# Patient Record
Sex: FEMALE | Race: WHITE | NOT HISPANIC OR LATINO | Employment: FULL TIME | URBAN - METROPOLITAN AREA
[De-identification: names, ages, dates, MRNs, and addresses within clinical notes are randomized per-mention and may not be internally consistent; named-entity substitution may affect disease eponyms.]

---

## 2017-05-25 ENCOUNTER — TRANSCRIBE ORDERS (OUTPATIENT)
Dept: OBSTETRICS AND GYNECOLOGY | Facility: CLINIC | Age: 52
End: 2017-05-25

## 2017-05-25 DIAGNOSIS — Z00.00 ROUTINE MEDICAL EXAM: Primary | ICD-10-CM

## 2017-06-22 ENCOUNTER — OFFICE VISIT (OUTPATIENT)
Dept: OBSTETRICS AND GYNECOLOGY | Facility: CLINIC | Age: 52
End: 2017-06-22

## 2017-06-22 ENCOUNTER — HOSPITAL ENCOUNTER (OUTPATIENT)
Dept: MAMMOGRAPHY | Facility: HOSPITAL | Age: 52
Discharge: HOME | End: 2017-06-22
Attending: OBSTETRICS & GYNECOLOGY | Admitting: OBSTETRICS & GYNECOLOGY

## 2017-06-22 VITALS
DIASTOLIC BLOOD PRESSURE: 84 MMHG | WEIGHT: 211 LBS | BODY MASS INDEX: 39.84 KG/M2 | HEIGHT: 61 IN | SYSTOLIC BLOOD PRESSURE: 122 MMHG

## 2017-06-22 DIAGNOSIS — L90.0 LICHEN SCLEROSUS ET ATROPHICUS: ICD-10-CM

## 2017-06-22 DIAGNOSIS — Z13.9 SCREENING: Primary | ICD-10-CM

## 2017-06-22 DIAGNOSIS — Z11.51 ENCOUNTER FOR SCREENING FOR HUMAN PAPILLOMAVIRUS (HPV): ICD-10-CM

## 2017-06-22 DIAGNOSIS — Z00.00 ROUTINE MEDICAL EXAM: ICD-10-CM

## 2017-06-22 DIAGNOSIS — Z00.00 ENCOUNTER FOR ANNUAL GENERAL MEDICAL EXAMINATION WITHOUT ABNORMAL FINDINGS IN ADULT: ICD-10-CM

## 2017-06-22 DIAGNOSIS — R92.8 ABNORMAL MAMMOGRAM: Primary | ICD-10-CM

## 2017-06-22 LAB
BILIRUB BLD-MCNC: NEGATIVE MG/DL
CLARITY, POC: CLEAR
COLOR UR: YELLOW
GLUCOSE UR STRIP-MCNC: NEGATIVE MG/DL
KETONES UR QL: NEGATIVE
LEUKOCYTE EST, POC: NEGATIVE
NITRITE UR-MCNC: NEGATIVE MG/ML
PH UR: 5 [PH] (ref 5–8)
PROT UR STRIP-MCNC: NEGATIVE MG/DL
RBC # UR STRIP: NEGATIVE /UL
SP GR UR: 1.03 (ref 1–1.03)
UROBILINOGEN UR QL: NORMAL

## 2017-06-22 PROCEDURE — 77063 BREAST TOMOSYNTHESIS BI: CPT

## 2017-06-22 PROCEDURE — G0202 SCR MAMMO BI INCL CAD: HCPCS

## 2017-06-22 PROCEDURE — 81002 URINALYSIS NONAUTO W/O SCOPE: CPT | Performed by: OBSTETRICS & GYNECOLOGY

## 2017-06-22 PROCEDURE — 99396 PREV VISIT EST AGE 40-64: CPT | Performed by: OBSTETRICS & GYNECOLOGY

## 2017-06-22 RX ORDER — ROSUVASTATIN CALCIUM 5 MG/1
5 TABLET, COATED ORAL NIGHTLY
COMMUNITY

## 2017-06-22 RX ORDER — ASPIRIN 81 MG/1
81 TABLET, CHEWABLE ORAL DAILY
COMMUNITY
End: 2017-08-24 | Stop reason: HOSPADM

## 2017-06-22 NOTE — PROGRESS NOTES
GYN Annual Exam     CC- Here for annual exam.     Jessica Suazo is a 51 y.o. female est pt who presents for annual well woman exam. Periods are irregular, lasting 4 days. May skip 2-4 months at a time. She is having a LS flare and has run out of Clobetasol. Had MMG today    OB History      Para Term  AB TAB SAB Ectopic Multiple Living    1 1 1       1        Obstetric Comments    1 C/S          Menarche: 14  Current contraception: abstinence and condoms  History of abnormal Pap smear: no  History of abnormal mammogram: no  Family history of uterine, colon or ovarian cancer: yes - unknown relative colon cancer  Family history of breast cancer: yes - mom age  H/o STDs: HSV 2 +     Health Maintenance   Topic Date Due   • TDAP/TD VACCINES (1 - Tdap) 1986   • PAP SMEAR  2017   • INFLUENZA VACCINE  2017       Past Medical History:   Diagnosis Date   • Anxiety    • Asthma    • GERD (gastroesophageal reflux disease) 2017   • Herpes     + HSv no outbreaks   • HTN (hypertension) 2017   • Hypertension    • Lichen sclerosus    • Lichen sclerosus et atrophicus 2017   • Migraine        Past Surgical History:   Procedure Laterality Date   •  SECTION           Current Outpatient Prescriptions:   •  aspirin 81 MG chewable tablet, Chew 81 mg Daily., Disp: , Rfl:   •  fluticasone-salmeterol (ADVAIR) 100-50 MCG/DOSE DISKUS, Inhale 2 (Two) Times a Day., Disp: , Rfl:   •  rosuvastatin (CRESTOR) 5 MG tablet, Take 5 mg by mouth Daily., Disp: , Rfl:   •  clobetasol (TEMOVATE) 0.05 % ointment, Apply  topically 2 (Two) Times a Day. Apply BID x 14 days, then QD x 14 days, then 3 times a week as needed for itching, Disp: 60 g, Rfl: 2    No Known Allergies    Social History   Substance Use Topics   • Smoking status: Former Smoker   • Smokeless tobacco: Never Used   • Alcohol use No       Family History   Problem Relation Age of Onset   • Breast cancer Mother        Review of Systems  "  Constitutional: Negative for appetite change, fatigue, fever and unexpected weight change.   Respiratory: Negative for cough and shortness of breath.    Cardiovascular: Negative for chest pain and palpitations.   Gastrointestinal: Negative for abdominal distention, abdominal pain, constipation, diarrhea and nausea.   Genitourinary: Positive for vaginal pain (and irching). Negative for dyspareunia, dysuria, menstrual problem, pelvic pain and vaginal discharge.   Skin: Negative for color change and rash.   Neurological: Negative for headaches.   Psychiatric/Behavioral: Negative for dysphoric mood. The patient is not nervous/anxious.        /84  Ht 61\" (154.9 cm)  Wt 211 lb (95.7 kg)  LMP 05/30/2017 (Approximate)  BMI 39.87 kg/m2    Physical Exam   Constitutional: She is oriented to person, place, and time. She appears well-developed and well-nourished.   HENT:   Head: Normocephalic and atraumatic.   Neck: No thyromegaly present.   Cardiovascular: Normal rate and regular rhythm.    Pulmonary/Chest: Effort normal and breath sounds normal. Right breast exhibits no inverted nipple, no mass, no nipple discharge, no skin change and no tenderness. Left breast exhibits no inverted nipple, no mass, no nipple discharge, no skin change and no tenderness.   Abdominal: Soft. Bowel sounds are normal. She exhibits no distension and no mass. There is no tenderness. No hernia.   Genitourinary: Uterus normal.       Pelvic exam was performed with patient supine. There is labial fusion. There is rash and lesion on the right labia. There is no tenderness on the right labia. There is rash and lesion on the left labia. There is no tenderness on the left labia. Cervix exhibits no motion tenderness, no discharge and no friability. Right adnexum displays no mass, no tenderness and no fullness. Left adnexum displays no mass, no tenderness and no fullness. No erythema, tenderness or bleeding in the vagina. No signs of injury around " the vagina. No vaginal discharge found.   Neurological: She is oriented to person, place, and time.   Skin: Skin is warm and dry.   Psychiatric: She has a normal mood and affect. Her behavior is normal. Judgment and thought content normal.   Vitals reviewed.         Assessment/Plan    1) GYN HM: check pap/HPV   SBE demonstrated and encouraged.  2) STD screening: declines Condoms encouraged.  3) Contraception: condoms  4) LS- refill Clobetasol and start a taper. Enc pt to call back if she has not had relief after finishing taper  5) Diet and Exercise discussed  6) Smoking Status: non smoker  7) Refer C scope > 50  8) MMG- had today, results pending.  9)Follow up prn or 1 year       Jessica was seen today for gynecologic exam.    Diagnoses and all orders for this visit:    Screening  -     POC Urinalysis Dipstick  -     Ambulatory Referral For Screening Colonoscopy    Encounter for screening for human papillomavirus (HPV)  -     Pap IG, HPV-hr    Encounter for annual general medical examination without abnormal findings in adult  -     Pap IG, HPV-hr    Lichen sclerosus et atrophicus    Other orders  -     clobetasol (TEMOVATE) 0.05 % ointment; Apply  topically 2 (Two) Times a Day. Apply BID x 14 days, then QD x 14 days, then 3 times a week as needed for itching          Dee Morales MD  6/23/2017  9:01 AM

## 2017-06-23 ENCOUNTER — DOCUMENTATION (OUTPATIENT)
Dept: OBSTETRICS AND GYNECOLOGY | Facility: CLINIC | Age: 52
End: 2017-06-23

## 2017-06-23 ENCOUNTER — TELEPHONE (OUTPATIENT)
Dept: OBSTETRICS AND GYNECOLOGY | Facility: CLINIC | Age: 52
End: 2017-06-23

## 2017-06-23 PROBLEM — J45.909 ASTHMA: Status: ACTIVE | Noted: 2017-06-23

## 2017-06-23 PROBLEM — G43.909 MIGRAINE: Status: ACTIVE | Noted: 2017-06-23

## 2017-06-23 PROBLEM — I10 HTN (HYPERTENSION): Status: ACTIVE | Noted: 2017-06-23

## 2017-06-23 PROBLEM — L90.0 LICHEN SCLEROSUS ET ATROPHICUS: Status: ACTIVE | Noted: 2017-06-23

## 2017-06-23 PROBLEM — K21.9 GERD (GASTROESOPHAGEAL REFLUX DISEASE): Status: ACTIVE | Noted: 2017-06-23

## 2017-06-23 RX ORDER — CLOBETASOL PROPIONATE 0.5 MG/G
OINTMENT TOPICAL 2 TIMES DAILY
Qty: 60 G | Refills: 2 | Status: SHIPPED | OUTPATIENT
Start: 2017-06-23 | End: 2017-09-20

## 2017-06-27 LAB
CYTOLOGIST CVX/VAG CYTO: NORMAL
CYTOLOGY CVX/VAG DOC THIN PREP: NORMAL
DX ICD CODE: NORMAL
HIV 1 & 2 AB SER-IMP: NORMAL
HPV I/H RISK 1 DNA CVX QL PROBE+SIG AMP: NEGATIVE
Lab: NORMAL
OTHER STN SPEC: NORMAL
PATH REPORT.FINAL DX SPEC: NORMAL
STAT OF ADQ CVX/VAG CYTO-IMP: NORMAL

## 2017-06-28 ENCOUNTER — HOSPITAL ENCOUNTER (OUTPATIENT)
Dept: ULTRASOUND IMAGING | Facility: HOSPITAL | Age: 52
Discharge: HOME | End: 2017-06-28
Attending: OBSTETRICS & GYNECOLOGY | Admitting: OBSTETRICS & GYNECOLOGY

## 2017-06-28 DIAGNOSIS — R92.8 ABNORMAL MAMMOGRAM: ICD-10-CM

## 2017-06-28 PROCEDURE — 76641 ULTRASOUND BREAST COMPLETE: CPT

## 2017-06-29 DIAGNOSIS — R92.8 ABNORMAL MAMMOGRAM: Primary | ICD-10-CM

## 2017-07-03 ENCOUNTER — HOSPITAL ENCOUNTER (OUTPATIENT)
Dept: ULTRASOUND IMAGING | Facility: HOSPITAL | Age: 52
Discharge: HOME | End: 2017-07-03
Attending: OBSTETRICS & GYNECOLOGY | Admitting: OBSTETRICS & GYNECOLOGY

## 2017-07-03 ENCOUNTER — HOSPITAL ENCOUNTER (OUTPATIENT)
Dept: MAMMOGRAPHY | Facility: HOSPITAL | Age: 52
Discharge: HOME | End: 2017-07-03

## 2017-07-03 DIAGNOSIS — R92.8 ABNORMAL MAMMOGRAM: ICD-10-CM

## 2017-07-12 ENCOUNTER — TELEPHONE (OUTPATIENT)
Dept: OBSTETRICS AND GYNECOLOGY | Facility: CLINIC | Age: 52
End: 2017-07-12

## 2017-07-12 ENCOUNTER — TELEPHONE (OUTPATIENT)
Dept: SURGERY | Facility: CLINIC | Age: 52
End: 2017-07-12

## 2017-07-12 DIAGNOSIS — C50.411 BREAST CANCER OF UPPER-OUTER QUADRANT OF RIGHT FEMALE BREAST (HCC): Primary | ICD-10-CM

## 2017-07-12 DIAGNOSIS — C50.911 MALIGNANT NEOPLASM OF RIGHT FEMALE BREAST, UNSPECIFIED SITE OF BREAST: Primary | ICD-10-CM

## 2017-07-12 NOTE — TELEPHONE ENCOUNTER
Mariposa left [facility] for pt to call to inform of appts:  KARLA MRI 7-13-17 7:15 arrival  Dr. Kinsey 7-17-17 9:30 appt (9:00 arrival)  Precert suzanne Mcgowan at [facility] valid until 8-26-17 [id number]

## 2017-07-12 NOTE — TELEPHONE ENCOUNTER
Pt will see Dr MARISCAL 7-17-17 arrive 9:00    Scheduled appointment with Genetic's at [facility] 7-19-17 @ 10:30     KDL    Pt is aware of all appointments  jeannette

## 2017-07-13 ENCOUNTER — HOSPITAL ENCOUNTER (OUTPATIENT)
Dept: MRI IMAGING | Facility: HOSPITAL | Age: 52
Discharge: HOME | End: 2017-07-13
Attending: SURGERY | Admitting: SURGERY

## 2017-07-13 DIAGNOSIS — C50.411 BREAST CANCER OF UPPER-OUTER QUADRANT OF RIGHT FEMALE BREAST (HCC): ICD-10-CM

## 2017-07-13 PROCEDURE — A9577 INJ MULTIHANCE: HCPCS | Performed by: SURGERY

## 2017-07-13 PROCEDURE — 0 GADOBENATE DIMEGLUMINE 529 MG/ML SOLUTION: Performed by: SURGERY

## 2017-07-13 PROCEDURE — 0159T HC MRI BREAST COMPUTER ANALYSIS: CPT

## 2017-07-13 PROCEDURE — C8908 MRI W/O FOL W/CONT, BREAST,: HCPCS

## 2017-07-13 RX ADMIN — GADOBENATE DIMEGLUMINE 20 ML: 529 INJECTION, SOLUTION INTRAVENOUS at 08:43

## 2017-07-14 DIAGNOSIS — R92.8 ABNORMAL MRI, BREAST: Primary | ICD-10-CM

## 2017-07-17 ENCOUNTER — OFFICE VISIT (OUTPATIENT)
Dept: SURGERY | Facility: CLINIC | Age: 52
End: 2017-07-17

## 2017-07-17 ENCOUNTER — PREP FOR SURGERY (OUTPATIENT)
Dept: OTHER | Facility: HOSPITAL | Age: 52
End: 2017-07-17

## 2017-07-17 ENCOUNTER — TELEPHONE (OUTPATIENT)
Dept: SURGERY | Facility: CLINIC | Age: 52
End: 2017-07-17

## 2017-07-17 ENCOUNTER — TRANSCRIBE ORDERS (OUTPATIENT)
Dept: SURGERY | Facility: CLINIC | Age: 52
End: 2017-07-17

## 2017-07-17 VITALS
WEIGHT: 200.8 LBS | HEART RATE: 79 BPM | TEMPERATURE: 99.1 F | HEIGHT: 61 IN | DIASTOLIC BLOOD PRESSURE: 76 MMHG | OXYGEN SATURATION: 98 % | SYSTOLIC BLOOD PRESSURE: 130 MMHG | BODY MASS INDEX: 37.91 KG/M2

## 2017-07-17 DIAGNOSIS — E66.9 OBESITY (BMI 35.0-39.9 WITHOUT COMORBIDITY): ICD-10-CM

## 2017-07-17 DIAGNOSIS — C50.411 BREAST CANCER OF UPPER-OUTER QUADRANT OF RIGHT FEMALE BREAST (HCC): Primary | ICD-10-CM

## 2017-07-17 DIAGNOSIS — R92.1 BREAST CALCIFICATIONS ON MAMMOGRAM: ICD-10-CM

## 2017-07-17 DIAGNOSIS — Z82.49 FAMILY HISTORY OF DVT: ICD-10-CM

## 2017-07-17 DIAGNOSIS — Z80.3 FH: BREAST CANCER: ICD-10-CM

## 2017-07-17 DIAGNOSIS — C50.411 MALIGNANT NEOPLASM OF UPPER-OUTER QUADRANT OF RIGHT FEMALE BREAST (HCC): Primary | ICD-10-CM

## 2017-07-17 DIAGNOSIS — R92.8 ABNORMAL MRI, BREAST: ICD-10-CM

## 2017-07-17 PROCEDURE — 99244 OFF/OP CNSLTJ NEW/EST MOD 40: CPT | Performed by: SURGERY

## 2017-07-17 RX ORDER — DIAZEPAM 5 MG/1
10 TABLET ORAL ONCE
Status: CANCELLED | OUTPATIENT
Start: 2017-08-23 | End: 2017-07-17

## 2017-07-17 RX ORDER — LIDOCAINE AND PRILOCAINE 25; 25 MG/G; MG/G
CREAM TOPICAL
Qty: 30 G | Refills: 0 | Status: SHIPPED | OUTPATIENT
Start: 2017-07-17 | End: 2017-08-24 | Stop reason: HOSPADM

## 2017-07-17 RX ORDER — SODIUM CHLORIDE, SODIUM LACTATE, POTASSIUM CHLORIDE, CALCIUM CHLORIDE 600; 310; 30; 20 MG/100ML; MG/100ML; MG/100ML; MG/100ML
100 INJECTION, SOLUTION INTRAVENOUS CONTINUOUS
Status: CANCELLED | OUTPATIENT
Start: 2017-08-23

## 2017-07-17 RX ORDER — CEFAZOLIN SODIUM 2 G/100ML
2 INJECTION, SOLUTION INTRAVENOUS ONCE
Status: CANCELLED | OUTPATIENT
Start: 2017-08-23 | End: 2017-07-17

## 2017-07-17 RX ORDER — MULTIPLE VITAMINS W/ MINERALS TAB 9MG-400MCG
1 TAB ORAL DAILY
COMMUNITY
End: 2017-08-24 | Stop reason: HOSPADM

## 2017-07-17 NOTE — TELEPHONE ENCOUNTER
Scheduled MRi Right Breast Bx versus Right Mammo Stereotactic Breast Dr Barron will decide.  I scheduled with Tessa in Mammo    7-25-17 arrive 6:30am    Scheduled Consult with Dr Waterhouse (Plastic Surgeon) 7-31-17 arrive 11:30    Scheduled Consult with CBC?  Elza will let me know the date    Scheduled Surgery 8-23-17 Main OR  Right Breast Ultrasound Guided Lumpectomy, Right Axilla [place] Node Biopsy  Oncoplastic Closure Right, Left Mastopexy, Dr Waterhouse    Arrive         9:30  SI             11:30  Surgery    12:30    PAT 8/14/17 @ 2:30  Return to see Dr MARISCAL 9-5-17 arrive 9:15    Pt is aware of all appointments      kdl

## 2017-07-17 NOTE — PROGRESS NOTES
Chief Complaint: Jessica Suazo is a 51 y.o. female who was seen in consultation at the request of Dee Morales*  for newly diagnosed breast cancer    History of Present Illness:  Patient presents with newly diagnosed breast cancer, RIGHT breast. She noted no new masses, skin changes, nipple discharge, nipple changes prior to her most recent imaging.  Her most recent imaging includes the following: June 22, 2017 heterogenous a dense breast tissue.  2 cm mass in the axillary tail the right breast, BI-RADS 0.  Most previous prior was January 11, 2016 screening mammogram that showed bilateral calcifications right greater than left with no change.  BI-RADS 2.  She came back June 28, 2017 for a right breast ultrasound that showed at the 10:00 location a mass in the right breast measuring 1.3 cm, BI-RADS 5.  July 3, 2017 underwent an ultrasound-guided biopsy for cores a 14-gauge.  Marker left at the medial margin of the lesion.  Pathology returned as invasive lobular carcinoma, low grade, 3, 1, 1, foci of LCIS, no lymphovascular invasion, estrogen 95 progesterone 95 and HER-2/tanya 0.    I then had her get a bilateral breast MRI at [facility] July 14, 2017.  Right breast 10:00, 9 cm from the nipple a 1.9 x 1.7 cm mass with a clip at the posterior margin.  Bilateral short scattered segmental enhancement.  Reviewed outside mammogram and felt that the calcifications are greater on the right than the left.  Also some areas of short segment enhancement right breast.  Recommend sampling either of calcifications or enhancement.  I discussed this in person with Dr. Barron and this was his assessment.    She has not had a breast biopsy in the past.  She has her uterus and ovaries, is premenopausal, and takes no hormones.  Her family history includes the following: She has one son who is 20.  She has one sister, 7 brothers, no maternal aunts, 4 paternal aunts.  She has 2 brothers and 1 sister who had DVTs and/or  pulmonary emboli.  Her mother had breast cancer age 70, no other breast or ovarian cancer in her family.  She is here for evaluation.    Review of Systems:  Review of Systems   All other systems reviewed and are negative.       Past Medical and Surgical History:  Breast Biopsy History:  Patient had not had a breast biopsy prior to her cancer diagnosis.  Breast Cancer HIstory:  Patient does not have a past medical history of breast cancer.  Breast Operations, and year:  None   Obstetric/Gynecologic History:  Age menstrual periods began: 14  Patient is premenopausal, first day of last period: 2017  Number of pregnancies:1  Number of live births: 1  Number of abortions or miscarriages: 0  Age of delivery of first child: 29  Patient did not breast feed.  Length of time taking birth control pills:6 yrs   Patient has never taken hormone replacement  Patient still has uterus and ovaries.     Past Surgical History:   Procedure Laterality Date   •  SECTION       Past Medical History:   Diagnosis Date   • Anxiety    • Asthma    • GERD (gastroesophageal reflux disease) 2017   • Herpes     + HSv no outbreaks   • HTN (hypertension) 2017   • Hypertension    • Lichen sclerosus    • Lichen sclerosus et atrophicus 2017   • Migraine        Prior Hospitalizations, other than for surgery or childbirth, and year:  None     Social History     Social History   • Marital status:      Spouse name: N/A   • Number of children: N/A   • Years of education: N/A     Occupational History   • Not on file.     Social History Main Topics   • Smoking status: Former Smoker   • Smokeless tobacco: Never Used   • Alcohol use No   • Drug use: No   • Sexual activity: Not Currently     Partners: Male     Birth control/ protection: Condom     Other Topics Concern   • Not on file     Social History Narrative     Patient is .  Patient is employed full time with the following occupation:   drinks  "caffeine    Family History:  Family History   Problem Relation Age of Onset   • Breast cancer Mother 70   • Hearing loss Mother    • Hyperlipidemia Mother    • Diabetes Father    • Asthma Sister    • Clotting disorder Sister    • COPD Sister    • Deep vein thrombosis Brother        Vital Signs:  /76  Pulse 79  Temp 99.1 °F (37.3 °C)  Ht 61\" (154.9 cm)  Wt 200 lb 12.8 oz (91.1 kg)  SpO2 98%  BMI 37.94 kg/m2     Medications:    Current Outpatient Prescriptions:   •  aspirin 81 MG chewable tablet, Chew 81 mg Daily., Disp: , Rfl:   •  fluticasone-salmeterol (ADVAIR) 100-50 MCG/DOSE DISKUS, Inhale 2 (Two) Times a Day., Disp: , Rfl:   •  Multiple Vitamins-Minerals (MULTIVITAMIN WITH MINERALS) tablet tablet, Take 1 tablet by mouth Daily., Disp: , Rfl:   •  rosuvastatin (CRESTOR) 5 MG tablet, Take 5 mg by mouth Daily., Disp: , Rfl:   •  Sumatriptan-Naproxen Sodium (TREXIMET PO), Take  by mouth., Disp: , Rfl:   •  clobetasol (TEMOVATE) 0.05 % ointment, Apply  topically 2 (Two) Times a Day. Apply BID x 14 days, then QD x 14 days, then 3 times a week as needed for itching, Disp: 60 g, Rfl: 2     Allergies:  No Known Allergies    Physical Examination:  /76  Pulse 79  Temp 99.1 °F (37.3 °C)  Ht 61\" (154.9 cm)  Wt 200 lb 12.8 oz (91.1 kg)  SpO2 98%  BMI 37.94 kg/m2  General Appearance:  Patient is in no distress.  She is well kept and has an obese build.   Psychiatric:  Patient with appropriate mood and affect. Alert and oriented to self, time, and place.    Breast, RIGHT:  large sized,a symmetric with the contralateral side, LEFT breast slightly larger than the RIGHT..  Breast skin is without erythema, edema, rashes.  There are no visible abnormalities upon inspection during the arm-raising maneuver or with hands on hips in the sitting position. There is no nipple retraction, discharge or nipple/areolar skin changes.at the right breast 10:00, 7.5 cm from the nipple location, there is a 3 x 1.75 cm mass " palpable.  The mass is somewhat platelike.  No overlying skin changes.There are no other masses palpable in the sitting or supine positions.    Breast, LEFT:  large sized,a symmetric with the contralateral side, LEFT breast slightly larger than the RIGHT..  Breast skin is without erythema, edema, rashes.  There are no visible abnormalities upon inspection during the arm-raising maneuver or with hands on hips in the sitting position. There is no nipple retraction, discharge or nipple/areolar skin changes.There are no masses palpable in the sitting or supine positions.    Lymphatic:  There is no axillary, cervical, infraclavicular, or supraclavicular adenopathy bilaterally.  Eyes:  Pupils are round and reactive to light.  Cardiovascular:  Heart rate and rhythm are regular.  Respiratory:  Lungs are clear bilaterally with no crackles or wheezes in any lung field.  Gastrointestinal:  Abdomen is soft, nondistended, and nontender.   Musculoskeletal:  Good strength in all 4 extremities.   There is good range of motion in both shoulders.    Skin:  No new skin lesions or rashes on the skin excluding the breast (see breast exam above).        Imagin/5/14    SCREENING MAMMOGRAM SYED, CLARA  Heterogeneously dense. BIRADS CATEGORY 2: Benign    16   BILATERAL SCREENING MAMMOGRAM SYED, CLARA  Multiple small benign-appearing calcifications bilaterally, more numerous on the right. Unchanged.  BIRADS CATEGORY 2    17  MAMMO SCREENING TOMOSYNTHESIS BILATERAL   SYED, CLARA  The breasts are heterogeneously dense. Suspicious irregular mass axillary portion of the right breast measuring 2 cm. This is a new finding. Ultrasound examination is recommended.  BIRADS CATEGORY 0    17  US BREAST RIGHT SYED, CLARA  Suspicious hypoechoic, irregular, shadowing mass at the 10:00 position right breast. Measures at least 1.3 cm.  BI-RADS CATEGORY 5    17  [facility] MRI BREAST BILATERAL    CLARA LYNCH   Right breast 10-o’clock 9 cm of the nipple enhancing mass that measures 1.9 cm in the anterior to posterior, 1.7 cm in the superior to inferior and 1.6 cm in the medial to lateral. A metallic clip is located along the posterior margin. No evidence for axillary adenopathy in either axilla. There is short segment linear enhancement that is scattered in both breasts and is below threshold. However, I do not have a recent mammogram for comparison. I recommend that a mammogram obtained in the last 6 months be pronounced for comparison to assess for the resented of any calcifications in the region which could warrant biopsy.   BIRADS Category 0    7/14/17  [facility]  ADDENDUM, BILATERAL BREAST MRI CLARA LYNCH  There are calcifications seen in an asymmetric distribution between the right and left breast. The preponderance of calcifications in the right breast relative to the left breast can be seen dating back to the prior examinations of 06/04/2014. In the middle third of the right breast at 12 o’clock position these is a cluster if microcalcifications that appears to have slightly increased in number over the prior years. Correlation with a stereotactic guided right breast biopsy of these calcifications is recommended. I recommend that the patient undergo a breast MRI examination in one year and 6 months mammographic follow-up of both breasts.     Pathology:  7/3/17     US GUIDED BREAST BIOPSY  CLARA LYNCH  ULTRASOUND-GUIDED CORE NEEDLE BIOPSY, RIGHT. CLIP MARKER PLACEMENT.  Biopsied 4 times 14 gauge Achieve. A clip marker was deployed. Postbiopsy mammogram shows the clip marker along the medial margin of the lesion.  7/3/17  [facility] PATHOLOGY  CLARA LYNCH. RIGHT BREAST, CORE BIOPSY: INVASIVE LOBULAR CARCINOMA   0.9 cm. Score I/III (tubules=3, nuclei=1, mitosis=1).  Foci of lobular carcinoma in situ (LCIS) identified. No capillary lymphatic nor perineural invasions  identified.  ESTROGEN RECEPTOR          95%  PROGESTERONE RECEPTOR        95%  Her2     0    Procedures:      Assessment:   Diagnosis Plan   1. Breast cancer of upper-outer quadrant of right female breast  Mammo Stereotactic Breast Biopsy Surgical 1st Right    MRI Breast Biopsy Right    Ambulatory Referral to Plastic Surgery    Ambulatory Referral to Hematology / Oncology   2. FH: breast cancer     3. Abnormal MRI, breast     4. Breast calcifications on mammogram     5. Obesity (BMI 35.0-39.9 without comorbidity)     6. Family history of DVT     1-  RIGHT 10:00 7.5 CFN- on exam 3 x 1.75 cm, on ultrasound 1.3 cm, on MRI 1.9 cm. Normal nodes on exam and MRI.  Inv lobular carcinoma, low grade, 3,1,1, foci LCIS, no LVI, ER 95, NJ 95, her 2 tanya 0  Clinical stage T1cN0- IA    2-  Mom age 70    3-  MRI terence short segmental enhancement RIGHT- of indeterminate significance    4-  Calcifications scattered RIGHT > LEFT; one area ? Focally increased at one site- BR4    5-  BMI 37    6-  2 brothers and one sister with DVT and/or PE    Plan:  We reviewed the difference in systemic therapy versus locoregional. She knows that she will be seeing a medical oncologist to discuss adjuvant systemic therapy. We discussed that for early stage breast cancer, there are 2 options for locoregional treatment that have equivalent survival: total mastectomy versus lumpectomy and radiation, either with sentinel node biopsy.     She is interested in breast conservation.     We discussed the option of ipsilateral oncoplastic closure with contralateral  mastopexy, and she is interested in this. She has baseline ptosis and LEFT breast larger than RIGHT. I have marked the area of tumor on her skin for the plastic surgical planning visit.    We discussed the following procedure:  RIGHT breast ultrasound guided lumpectomy, RIGHT axilla sentinel node biopsy, oncoplastic closure RIGHT and mastopexy LEFT.    She understands the risks of lumpectomy  including bleeding, infection, seroma and 20% chance of positive margins. She understands the risks of  sentinel node biopsy, including 7% chance of lymphedema, injury to nerves or vessels in the axilla, seroma. We discussed that we would review her pathology from her sentinel node procedure in consideration of a complete axillary dissection, after her procedure.   NCCN guidelines have been followed.    We fitted her for a postoperative bra, gave her EMLA cream and tegederm for her sentinel node procedure, and had her to see our nurse navigator.     She will receive a recommendation for radiation after surgery.    For the family history of breast cancer, we have scheduled her to see genetics and she will see them on Wednesday.  We will plan to get those results preoperatively.    With regards to the low grade tumor with ER/MT highly positive, we will send an Oncotype off today.    With regards to the calcifications in both breasts, Dr. Barron has requested a bilateral mammogram January 2018.  With regards to the short segment enhancement right breast, Dr. Barron has recommended a bilateral MRI July 15, 2018.  He also plans to sample an area of enhancement versus calcifications with either an MRI or a stereotactic guided biopsy.  This will be next week and we will arrange that today.        Dyan Kinsey MD        We have spent 60 minutes in visit today, 45 in face to face .      Next Appointment:  Return for surgery.      EMR Dragon/transcription disclaimer:    Much of this encounter note is an electronic transcription/translocation of spoken language to printed text.  The electronic translation of spoken language may permit erroneous, or at times, nonsensical words or phrases to be inadvertently transcribed.  Although I have reviewed the note from such areas, some may still exist.

## 2017-07-17 NOTE — TELEPHONE ENCOUNTER
EMLA cream apply topically once for one dose  To nipple/areola and 1 cm outside of nipple  Before leaving home day of surgery    No refills     lml

## 2017-07-20 ENCOUNTER — TELEPHONE (OUTPATIENT)
Dept: SURGERY | Facility: CLINIC | Age: 52
End: 2017-07-20

## 2017-07-25 ENCOUNTER — HOSPITAL ENCOUNTER (OUTPATIENT)
Dept: MAMMOGRAPHY | Facility: HOSPITAL | Age: 52
Discharge: HOME | End: 2017-07-25

## 2017-07-25 ENCOUNTER — TELEPHONE (OUTPATIENT)
Dept: SURGERY | Facility: CLINIC | Age: 52
End: 2017-07-25

## 2017-07-25 ENCOUNTER — HOSPITAL ENCOUNTER (OUTPATIENT)
Dept: MRI IMAGING | Facility: HOSPITAL | Age: 52
Discharge: HOME | End: 2017-07-25
Attending: SURGERY | Admitting: SURGERY

## 2017-07-25 VITALS
WEIGHT: 200 LBS | TEMPERATURE: 96.8 F | OXYGEN SATURATION: 99 % | DIASTOLIC BLOOD PRESSURE: 90 MMHG | SYSTOLIC BLOOD PRESSURE: 140 MMHG | HEART RATE: 67 BPM | RESPIRATION RATE: 18 BRPM | BODY MASS INDEX: 37.76 KG/M2 | HEIGHT: 61 IN

## 2017-07-25 DIAGNOSIS — C50.411 BREAST CANCER OF UPPER-OUTER QUADRANT OF RIGHT FEMALE BREAST (HCC): ICD-10-CM

## 2017-07-25 LAB — CREAT BLDA-MCNC: 0.7 MG/DL (ref 0.6–1.3)

## 2017-07-25 PROCEDURE — 88342 IMHCHEM/IMCYTCHM 1ST ANTB: CPT | Performed by: SURGERY

## 2017-07-25 PROCEDURE — 82565 ASSAY OF CREATININE: CPT

## 2017-07-25 PROCEDURE — 0 GADOBENATE DIMEGLUMINE 529 MG/ML SOLUTION: Performed by: SURGERY

## 2017-07-25 PROCEDURE — G0206 DX MAMMO INCL CAD UNI: HCPCS

## 2017-07-25 PROCEDURE — A9577 INJ MULTIHANCE: HCPCS | Performed by: SURGERY

## 2017-07-25 PROCEDURE — 76098 X-RAY EXAM SURGICAL SPECIMEN: CPT

## 2017-07-25 PROCEDURE — 88305 TISSUE EXAM BY PATHOLOGIST: CPT | Performed by: SURGERY

## 2017-07-25 RX ORDER — LIDOCAINE HYDROCHLORIDE 10 MG/ML
1 INJECTION, SOLUTION INFILTRATION; PERINEURAL ONCE
Status: COMPLETED | OUTPATIENT
Start: 2017-07-25 | End: 2017-07-25

## 2017-07-25 RX ORDER — LIDOCAINE HYDROCHLORIDE AND EPINEPHRINE 10; 10 MG/ML; UG/ML
10 INJECTION, SOLUTION INFILTRATION; PERINEURAL ONCE
Status: COMPLETED | OUTPATIENT
Start: 2017-07-25 | End: 2017-07-25

## 2017-07-25 RX ADMIN — GADOBENATE DIMEGLUMINE 19 ML: 529 INJECTION, SOLUTION INTRAVENOUS at 08:25

## 2017-07-25 RX ADMIN — LIDOCAINE HYDROCHLORIDE 1 ML: 10 INJECTION, SOLUTION INFILTRATION; PERINEURAL at 09:20

## 2017-07-25 RX ADMIN — LIDOCAINE HYDROCHLORIDE,EPINEPHRINE BITARTRATE 10 ML: 10; .01 INJECTION, SOLUTION INFILTRATION; PERINEURAL at 09:21

## 2017-07-25 NOTE — NURSING NOTE
Biopsy site to right breast clear with Skin Affix dry and intact. No firmness or swelling noted at or around biopsy site. Denies pain. Ice pack with protective covering applied to biopsy site. Emphasized the importance of wearing her bra 24/7 for 4 days. Discharge instructions discussed with understanding voiced by patient. Copies provided to patient. No distress noted. To home via private vehicle.

## 2017-07-25 NOTE — TELEPHONE ENCOUNTER
Oncotype DX score dated July 25, 2017 returned as low risk score of 12 with a 10 year risk of distant recurrence on tamoxifen alone of 8%.

## 2017-07-25 NOTE — NURSING NOTE
VSS. Denies pain. Medical/surgical history and medications reviewed. No distress noted. Procedural education completed with patient's questions addressed.

## 2017-07-29 LAB
CYTO UR: NORMAL
LAB AP CASE REPORT: NORMAL
LAB AP CASE REPORT: NORMAL
LAB AP CLINICAL INFORMATION: NORMAL
Lab: NORMAL
Lab: NORMAL
PATH REPORT.ADDENDUM SPEC: NORMAL
PATH REPORT.FINAL DX SPEC: NORMAL
PATH REPORT.FINAL DX SPEC: NORMAL
PATH REPORT.GROSS SPEC: NORMAL

## 2017-07-31 ENCOUNTER — TELEPHONE (OUTPATIENT)
Dept: SURGERY | Facility: CLINIC | Age: 52
End: 2017-07-31

## 2017-07-31 NOTE — TELEPHONE ENCOUNTER
"Biopsy results returned as INvasive lobular carcinoma with LCIS, int grade, t3n2m1, max length 4mm. We will have her come back to discuss. I reviewed imagign w Dr Barron again and she will likely need a mastectomy.  Dr. Barron's procedure note is that the recent biopsy and the current biopsy R4 centimeters apart.  The most recent clip is 4 cm inferior and anterior to the previous biopsy site.    Clinical Information See result below   Final Diagnosis   BREAST, RIGHT, DESIGNATED \"9:30\" CORE BIOPSY:              INVASIVE LOBULAR CARCINOMA WITH ASSOCIATED LOBULAR CARCINOMA IN SITU.              PREDICTED KELLY SCORE: TUBULAR SCORE 3, NUCLEAR SCORE 2, MITOTIC SCORE 1;                       OVERALL GRADE 2 (SCORE 6 OF 9).              MAXIMUM MEASURED LENGTH OF INVASIVE CARCINOMA IN A SINGLE CORE IS 4 MM.              FIBROCYSTIC CHANGES WITH DUCT DILATATION AND STASIS AND APOCRINE METAPLASIA.               FOCAL FIBROADENOMATOID CHANGE.              FOCAL DUCTAL HYPERPLASIA OF THE USUAL TYPE.     COMMENT: The above diagnosis is supported by e-cadherin immunohistochemistry.  ER, SC, and HER-2/tanya studies will be performed with results to be reported in an addendum.           "

## 2017-08-01 ENCOUNTER — TELEPHONE (OUTPATIENT)
Dept: SURGERY | Facility: CLINIC | Age: 52
End: 2017-08-01

## 2017-08-02 ENCOUNTER — CONSULT (OUTPATIENT)
Dept: ONCOLOGY | Facility: CLINIC | Age: 52
End: 2017-08-02

## 2017-08-02 ENCOUNTER — PREP FOR SURGERY (OUTPATIENT)
Dept: OTHER | Facility: HOSPITAL | Age: 52
End: 2017-08-02

## 2017-08-02 ENCOUNTER — LAB (OUTPATIENT)
Dept: LAB | Facility: HOSPITAL | Age: 52
End: 2017-08-02

## 2017-08-02 ENCOUNTER — DOCUMENTATION (OUTPATIENT)
Dept: ONCOLOGY | Facility: CLINIC | Age: 52
End: 2017-08-02

## 2017-08-02 ENCOUNTER — TELEPHONE (OUTPATIENT)
Dept: SURGERY | Facility: CLINIC | Age: 52
End: 2017-08-02

## 2017-08-02 ENCOUNTER — APPOINTMENT (OUTPATIENT)
Dept: ONCOLOGY | Facility: CLINIC | Age: 52
End: 2017-08-02

## 2017-08-02 ENCOUNTER — OFFICE VISIT (OUTPATIENT)
Dept: SURGERY | Facility: CLINIC | Age: 52
End: 2017-08-02

## 2017-08-02 VITALS
OXYGEN SATURATION: 99 % | BODY MASS INDEX: 37 KG/M2 | TEMPERATURE: 97.7 F | HEART RATE: 75 BPM | SYSTOLIC BLOOD PRESSURE: 116 MMHG | RESPIRATION RATE: 18 BRPM | DIASTOLIC BLOOD PRESSURE: 74 MMHG | WEIGHT: 196 LBS | HEIGHT: 61 IN

## 2017-08-02 VITALS
HEART RATE: 96 BPM | HEIGHT: 61 IN | SYSTOLIC BLOOD PRESSURE: 116 MMHG | TEMPERATURE: 100 F | BODY MASS INDEX: 37 KG/M2 | WEIGHT: 196 LBS | OXYGEN SATURATION: 98 % | DIASTOLIC BLOOD PRESSURE: 79 MMHG

## 2017-08-02 DIAGNOSIS — C50.411 BREAST CANCER OF UPPER-OUTER QUADRANT OF RIGHT FEMALE BREAST (HCC): Primary | ICD-10-CM

## 2017-08-02 DIAGNOSIS — Z80.3 FH: BREAST CANCER: ICD-10-CM

## 2017-08-02 DIAGNOSIS — E66.9 OBESITY (BMI 35.0-39.9 WITHOUT COMORBIDITY): ICD-10-CM

## 2017-08-02 DIAGNOSIS — C50.919 MALIGNANT NEOPLASM OF FEMALE BREAST, UNSPECIFIED LATERALITY, UNSPECIFIED SITE OF BREAST: Primary | ICD-10-CM

## 2017-08-02 DIAGNOSIS — Z82.49 FAMILY HISTORY OF DVT: ICD-10-CM

## 2017-08-02 LAB
BASOPHILS # BLD AUTO: 0.11 10*3/MM3 (ref 0–0.1)
BASOPHILS NFR BLD AUTO: 1.4 % (ref 0–1.1)
DEPRECATED RDW RBC AUTO: 39.8 FL (ref 37–49)
EOSINOPHIL # BLD AUTO: 0.36 10*3/MM3 (ref 0–0.36)
EOSINOPHIL NFR BLD AUTO: 4.5 % (ref 1–5)
ERYTHROCYTE [DISTWIDTH] IN BLOOD BY AUTOMATED COUNT: 12.6 % (ref 11.7–14.5)
HCT VFR BLD AUTO: 46.9 % (ref 34–45)
HGB BLD-MCNC: 15.8 G/DL (ref 11.5–14.9)
IMM GRANULOCYTES # BLD: 0.04 10*3/MM3 (ref 0–0.03)
IMM GRANULOCYTES NFR BLD: 0.5 % (ref 0–0.5)
LYMPHOCYTES # BLD AUTO: 2.75 10*3/MM3 (ref 1–3.5)
LYMPHOCYTES NFR BLD AUTO: 34.4 % (ref 20–49)
MCH RBC QN AUTO: 29.3 PG (ref 27–33)
MCHC RBC AUTO-ENTMCNC: 33.7 G/DL (ref 32–35)
MCV RBC AUTO: 87 FL (ref 83–97)
MONOCYTES # BLD AUTO: 0.75 10*3/MM3 (ref 0.25–0.8)
MONOCYTES NFR BLD AUTO: 9.4 % (ref 4–12)
NEUTROPHILS # BLD AUTO: 3.99 10*3/MM3 (ref 1.5–7)
NEUTROPHILS NFR BLD AUTO: 49.8 % (ref 39–75)
NRBC BLD MANUAL-RTO: 0 /100 WBC (ref 0–0)
PLATELET # BLD AUTO: 165 10*3/MM3 (ref 150–375)
PMV BLD AUTO: 11.2 FL (ref 8.9–12.1)
RBC # BLD AUTO: 5.39 10*6/MM3 (ref 3.9–5)
WBC NRBC COR # BLD: 8 10*3/MM3 (ref 4–10)

## 2017-08-02 PROCEDURE — 99245 OFF/OP CONSLTJ NEW/EST HI 55: CPT | Performed by: INTERNAL MEDICINE

## 2017-08-02 PROCEDURE — 99214 OFFICE O/P EST MOD 30 MIN: CPT | Performed by: SURGERY

## 2017-08-02 RX ORDER — GABAPENTIN 400 MG/1
400 CAPSULE ORAL AS NEEDED
Refills: 0 | COMMUNITY
Start: 2017-07-14 | End: 2017-11-30

## 2017-08-02 NOTE — PROGRESS NOTES
Chief Complaint: Jessica Suazo is a 51 y.o. female who was seen in consultation at the request of No ref. provider found  for newly diagnosed breast cancer    History of Present Illness:  Patient presents with newly diagnosed breast cancer, RIGHT breast. She noted no new masses, skin changes, nipple discharge, nipple changes prior to her most recent imaging.  Her most recent imaging includes the following: June 22, 2017 heterogenous a dense breast tissue.  2 cm mass in the axillary tail the right breast, BI-RADS 0.  Most previous prior was January 11, 2016 screening mammogram that showed bilateral calcifications right greater than left with no change.  BI-RADS 2.  She came back June 28, 2017 for a right breast ultrasound that showed at the 10:00 location a mass in the right breast measuring 1.3 cm, BI-RADS 5.  July 3, 2017 underwent an ultrasound-guided biopsy for cores a 14-gauge.  Marker left at the medial margin of the lesion.  Pathology returned as invasive lobular carcinoma, low grade, 3, 1, 1, foci of LCIS, no lymphovascular invasion, estrogen 95 progesterone 95 and HER-2/tanya 0.    I then had her get a bilateral breast MRI at [facility] July 14, 2017.  Right breast 10:00, 9 cm from the nipple a 1.9 x 1.7 cm mass with a clip at the posterior margin.  Bilateral short scattered segmental enhancement.  Reviewed outside mammogram and felt that the calcifications are greater on the right than the left.  Also some areas of short segment enhancement right breast.  Recommend sampling either of calcifications or enhancement.  I discussed this in person with Dr. Barron and this was his assessment.    She has not had a breast biopsy in the past.  She has her uterus and ovaries, is premenopausal, and takes no hormones.  Her family history includes the following: She has one son who is 20.  She has one sister, 7 brothers, no maternal aunts, 4 paternal aunts.  She has 2 brothers and 1 sister who had DVTs and/or  pulmonary emboli.  Her mother had breast cancer age 70, no other breast or ovarian cancer in her family.  Interval HIstory:  Oncotype DX score dated 2017 returned as low risk score of 12 with a 10 year risk of distant recurrence on tamoxifen alone of 8%    Review of Systems:  Review of Systems   Constitutional: Negative for unexpected weight change (4 lb wt loss).   All other systems reviewed and are negative.       Past Medical and Surgical History:  Breast Biopsy History:  Patient had not had a breast biopsy prior to her cancer diagnosis.  Breast Cancer HIstory:  Patient does not have a past medical history of breast cancer.  Breast Operations, and year:  None   Obstetric/Gynecologic History:  Age menstrual periods began: 14  Patient is premenopausal, first day of last period: 2017  Number of pregnancies:1  Number of live births: 1  Number of abortions or miscarriages: 0  Age of delivery of first child: 29  Patient did not breast feed.  Length of time taking birth control pills:6 yrs   Patient has never taken hormone replacement  Patient still has uterus and ovaries.     Past Surgical History:   Procedure Laterality Date   • BREAST BIOPSY Right 2017   •  SECTION     • HAND SURGERY       Past Medical History:   Diagnosis Date   • Anxiety    • Asthma    • Cancer 2017    Right   • GERD (gastroesophageal reflux disease) 2017   • Herpes     + HSv no outbreaks   • HTN (hypertension) 2017   • Hypertension    • Lichen sclerosus    • Lichen sclerosus et atrophicus 2017   • Migraine        Prior Hospitalizations, other than for surgery or childbirth, and year:  None     Social History     Social History   • Marital status:      Spouse name: N/A   • Number of children: 1   • Years of education: N/A     Occupational History   •  [employer]     Social History Main Topics   • Smoking status: Former Smoker   • Smokeless tobacco: Never Used   • Alcohol use No   •  "Drug use: No   • Sexual activity: Not Currently     Partners: Male     Birth control/ protection: Condom     Other Topics Concern   • Not on file     Social History Narrative     Patient is .  Patient is employed full time with the following occupation:   drinks caffeine    Family History:  Family History   Problem Relation Age of Onset   • Breast cancer Mother 70   • Hearing loss Mother    • Hyperlipidemia Mother    • Diabetes Father    • Asthma Sister    • Clotting disorder Sister    • COPD Sister    • Pulmonary embolism Sister    • Deep vein thrombosis Brother    • Pulmonary embolism Brother        Vital Signs:  /79 (BP Location: Left arm, Patient Position: Sitting, Cuff Size: Adult)  Pulse 96  Temp 100 °F (37.8 °C) (Temporal Artery )   Ht 61\" (154.9 cm)  Wt 196 lb (88.9 kg)  SpO2 98%  BMI 37.03 kg/m2     Medications:    Current Outpatient Prescriptions:   •  aspirin 81 MG chewable tablet, Chew 81 mg Daily., Disp: , Rfl:   •  clobetasol (TEMOVATE) 0.05 % ointment, Apply  topically 2 (Two) Times a Day. Apply BID x 14 days, then QD x 14 days, then 3 times a week as needed for itching, Disp: 60 g, Rfl: 2  •  fluticasone-salmeterol (ADVAIR) 100-50 MCG/DOSE DISKUS, Inhale 2 (Two) Times a Day., Disp: , Rfl:   •  gabapentin (NEURONTIN) 400 MG capsule, , Disp: , Rfl: 0  •  lidocaine-prilocaine (EMLA) 2.5-2.5 % cream, Apply  topically 1 (One) Time. APPLY BEFORE LEAVING HOME DAY OF SURGERY, Disp: 30 g, Rfl: 0  •  Multiple Vitamins-Minerals (MULTIVITAMIN WITH MINERALS) tablet tablet, Take 1 tablet by mouth Daily., Disp: , Rfl:   •  rosuvastatin (CRESTOR) 5 MG tablet, Take 5 mg by mouth Daily., Disp: , Rfl:   •  Sumatriptan-Naproxen Sodium (TREXIMET PO), Take  by mouth., Disp: , Rfl:      Allergies:  No Known Allergies    Physical Examination:  /79 (BP Location: Left arm, Patient Position: Sitting, Cuff Size: Adult)  Pulse 96  Temp 100 °F (37.8 °C) (Temporal Artery )   Ht 61\" (154.9 " cm)  Wt 196 lb (88.9 kg)  SpO2 98%  BMI 37.03 kg/m2  General Appearance:  Patient is in no distress.  She is well kept and has an obese build.   Psychiatric:  Patient with appropriate mood and affect. Alert and oriented to self, time, and place.    Breast, RIGHT:  large sized,a symmetric with the contralateral side, LEFT breast slightly larger than the RIGHT..  Breast skin is without erythema, edema, rashes.  There are no visible abnormalities upon inspection during the arm-raising maneuver or with hands on hips in the sitting position. There is no nipple retraction, discharge or nipple/areolar skin changes.at the right breast 10:00, 7.5 cm from the nipple location, there is a 3 x 1.75 cm mass palpable.  The mass is somewhat platelike.  No overlying skin changes.There are no other masses palpable in the sitting or supine positions.    Breast, LEFT:  large sized,a symmetric with the contralateral side, LEFT breast slightly larger than the RIGHT..  Breast skin is without erythema, edema, rashes.  There are no visible abnormalities upon inspection during the arm-raising maneuver or with hands on hips in the sitting position. There is no nipple retraction, discharge or nipple/areolar skin changes.There are no masses palpable in the sitting or supine positions.    Lymphatic:  There is no axillary, cervical, infraclavicular, or supraclavicular adenopathy bilaterally.  Eyes:  Pupils are round and reactive to light.  Cardiovascular:  Heart rate and rhythm are regular.  Respiratory:  Lungs are clear bilaterally with no crackles or wheezes in any lung field.  Gastrointestinal:  Abdomen is soft, nondistended, and nontender.   Musculoskeletal:  Good strength in all 4 extremities.   There is good range of motion in both shoulders.    Skin:  No new skin lesions or rashes on the skin excluding the breast (see breast exam above).        Imagin/5/14    SCREENING MAMMOGRAM [facility]  Heterogeneously dense. BIRADS  CATEGORY 2: Benign    1/11/16   BILATERAL SCREENING MAMMOGRAM [facility]  Multiple small benign-appearing calcifications bilaterally, more numerous on the right. Unchanged.  BIRADS CATEGORY 2    6/22/17  MAMMO SCREENING TOMOSYNTHESIS BILATERAL   [facility]  The breasts are heterogeneously dense. Suspicious irregular mass axillary portion of the right breast measuring 2 cm. This is a new finding. Ultrasound examination is recommended.  BIRADS CATEGORY 0    6/28/17  US BREAST RIGHT [facility]  Suspicious hypoechoic, irregular, shadowing mass at the 10:00 position right breast. Measures at least 1.3 cm.  BI-RADS CATEGORY 5    7/13/17  [facility] MRI BREAST BILATERAL   [facility]   Right breast 10-o’clock 9 cm of the nipple enhancing mass that measures 1.9 cm in the anterior to posterior, 1.7 cm in the superior to inferior and 1.6 cm in the medial to lateral. A metallic clip is located along the posterior margin. No evidence for axillary adenopathy in either axilla. There is short segment linear enhancement that is scattered in both breasts and is below threshold. However, I do not have a recent mammogram for comparison. I recommend that a mammogram obtained in the last 6 months be pronounced for comparison to assess for the resented of any calcifications in the region which could warrant biopsy.   BIRADS Category 0    7/14/17  [facility]  ADDENDUM, BILATERAL BREAST MRI [facility]  There are calcifications seen in an asymmetric distribution between the right and left breast. The preponderance of calcifications in the right breast relative to the left breast can be seen dating back to the prior examinations of 06/04/2014. In the middle third of the right breast at 12 o’clock position these is a cluster if microcalcifications that appears to have slightly increased in number over the prior years. Correlation with a stereotactic guided right breast biopsy of these calcifications is  "recommended. I recommend that the patient undergo a breast MRI examination in one year and 6 months mammographic follow-up of both breasts.     Pathology:  7/3/17     US GUIDED BREAST BIOPSY  [facility]  ULTRASOUND-GUIDED CORE NEEDLE BIOPSY, RIGHT. CLIP MARKER PLACEMENT.  Biopsied 4 times 14 gauge Achieve. A clip marker was deployed. Postbiopsy mammogram shows the clip marker along the medial margin of the lesion.  7/3/17  [facility]  [facility]. RIGHT BREAST, CORE BIOPSY: INVASIVE LOBULAR CARCINOMA   0.9 cm. Score I/III (tubules=3, nuclei=1, mitosis=1).  Foci of lobular carcinoma in situ (LCIS) identified. No capillary lymphatic nor perineural invasions identified.  ESTROGEN RECEPTOR          95%  PROGESTERONE RECEPTOR        95%  Her2     0    07/25/17  [facility]     PATHOLOGY  SYED CLARA   Final Diagnosis   BREAST, RIGHT, DESIGNATED \"9:30\" CORE BIOPSY:              INVASIVE LOBULAR CARCINOMA WITH ASSOCIATED LOBULAR CARCINOMA IN SITU.              PREDICTED KELLY SCORE: TUBULAR SCORE 3, NUCLEAR SCORE 2, MITOTIC SCORE 1;                       OVERALL GRADE 2 (SCORE 6 OF 9).              MAXIMUM MEASURED LENGTH OF INVASIVE CARCINOMA IN A SINGLE CORE IS 4 MM.              FIBROCYSTIC CHANGES WITH DUCT DILATATION AND STASIS AND APOCRINE METAPLASIA.               FOCAL FIBROADENOMATOID CHANGE.              FOCAL DUCTAL HYPERPLASIA OF THE USUAL TYPE.      COMMENT: The above diagnosis is supported by e-cadherin immunohistochemistry.  ER, NJ, and HER-2/tanya studies will be performed with results to be reported in an addendum.          Procedures:      Assessment:   Diagnosis Plan   1. Breast cancer of upper-outer quadrant of right female breast     2. FH: breast cancer     3. Obesity (BMI 35.0-39.9 without comorbidity)     4. Family history of DVT          1-  RIGHT 10:00 7.5 CFN- on exam 3 x 1.75 cm, on ultrasound 1.3 cm, on MRI 1.9 cm. Normal nodes on exam and MRI.  Inv " lobular carcinoma, low grade, 3,1,1, foci LCIS, no LVI, ER 95, ND 95, her 2 tanya 0    MRI terence short segmental enhancement RIGHT- of indeterminate significance--Biopsy results returned as INvasive lobular carcinoma with LCIS, int grade, [id number], max length 4mm. We will have her come back to discuss. I reviewed imagign w Dr Barron again and she will likely need a mastectomy.  Dr. Barron's procedure note is that the recent biopsy and the current biopsy R4 centimeters apart.  The most recent clip is 4 cm inferior and anterior to the previous biopsy site.    Clinical stage [id number]- IA    Oncotype DX score dated July 25, 2017 returned as low risk score of 12 with a 10 year risk of distant recurrence on tamoxifen alone of 8%    2-  Mom age 70  Genetics pending    3-  BMI 37    4-  2 brothers and one sister with DVT and/or PE    Plan:  Mrs. Suazo is doing well today.    We reviewed her interval biopsy and the over 4 cm distance between the index lesion and the multi-focal or multicentric satellite lesion.  She understands.  We discussed the procedure of right total mastectomy, right axillary sentinel lymph node biopsy, possible axillary lymph node dissection, immediate reconstruction right breast with expander.  We discussed that she may not need radiation therapy.  We discussed that we will need to know the hypercoagulable workup results preoperatively in case she needs some subcutaneous Lovenox.  We discussed that her Oncotype returned as low risk.  We discussed that her genetics results are pending and that this wouldn't affect her reconstructive and surgical procedure.    She understands the risks of mastectomy including bleeding, infection, seroma and chance of skin ischemia/necrosis. She understands the risks of  sentinel node biopsy, including 7% chance of lymphedema, injury to nerves or vessels in the axilla,  seroma. We discussed that we will proceed with a frozen section analysis of the sentinel node in the  operating room, and if any positivity, would proceed with a completion axillary dissection at that time.    She will receive a recommendation about radiation after surgery.      Dyan Kinsey MD        We have spent 30 minutes in visit today, 20 in face to face .      Next Appointment:  Return for surgery.      EMR Dragon/transcription disclaimer:    Much of this encounter note is an electronic transcription/translocation of spoken language to printed text.  The electronic translation of spoken language may permit erroneous, or at times, nonsensical words or phrases to be inadvertently transcribed.  Although I have reviewed the note from such areas, some may still exist.

## 2017-08-02 NOTE — PROGRESS NOTES
Subjective .     REASON FOR CONSULTATION:  Clinical stage IA ([id number]) right breast cancer.  Provide an opinion on any further workup or treatment                             REQUESTING PHYSICIAN:  Dr Dyan Kinsey    RECORDS OBTAINED:  Records of the patients history including those obtained from the referring provider were reviewed and summarized in detail.    HISTORY OF PRESENT ILLNESS:  The patient is a 51 y.o. year old female who is here for an initial visit with the above-mentioned history.  The patient underwent an annual screening mammograms 6/22/17 with a suspicious area around the right axillary portion of the breast.  Ultrasound 6/28/17 confirmed a suspicious 1.3 cm mass in the right 10 o'clock position.  Ultrasound-guided biopsy 7/3/17 showed an invasive lobular carcinoma largest size measuring 9 mm, grade 1, ER positive (95%), OR positive (95%), HER-2/tanya 0 (IHC).  There were foci of associated LCIS.  There was no lymphatic nor perineural invasion.  Oncotype DX testing was sent revealing a recurrent score in a low risk range at 12 (10 year recurrence risk of 8% with treatment with tamoxifen, 1% benefit from adjuvant chemotherapy) and confirmed ER positive, OR positive, HER-2/tanya negative.  MRI of the breasts 7 sets 13/17 showed the biopsy-proven right breast malignancy measuring 1.9 cm with no axillary lymph node enlargement.  There was however in the middle third of the right breast a cluster of microcalcifications that appear to have increased mammographically over prior years.  An MRI guided biopsy of this area was performed 7/25/17 in the 9:30 position revealing an invasive lobular carcinoma, 4 mm, grade 2, ER positive (greater than 90%), OR positive (90%), HER-2/tanya 1+ recurrent sees IHC) with associated LCIS.  The patient was seen in the genetics clinic and underwent panel testing with results currently still pending.  She has a family history of malignancy with breast cancer in her mother  "at age 70.  The patient has been seen by Dr. Kinsey who has discussed surgical management of her disease.  At this point with evidence of multifocal involvement of the breast, it is anticipated that she will require a right mastectomy with sentinel lymph node biopsy that is already scheduled for 8/23/17.  She did see Dr. Waterhouse as well in regards to reconstruction.  She is here today to review her findings and to discuss potential recommendations for adjuvant therapy.    She also notes a significant family history of thrombosis with a brother who developed a pulmonary embolism and required surgical resection of a portion of his lung in his 30s with no precipitating event.  Another brother had DVT at age 44.  A sister had a pulmonary embolism at age 55.  The patient herself did undergo a hypercoagulable evaluation performed by her primary care physician [facility] 9/24/15.  Results showed negative prothrombin gene mutation analysis, negative factor V Leiden gene mutation analysis, negative lupus anticoagulant, negative anticardiolipin antibodies, normal homocystine, protein C activity 168%, protein S free 107%.  The patient herself has never experienced a thrombosis.  Given her family history however she is continuing to take an aspirin 81 mg per day.  She did receive oral contraceptives in the past but quit in 2002 after approximately 10 years of use.  She has been a \"social smoker\" intermittently over the years.    The patient has no physical complaints currently except for some mild tenderness around the biopsy site in her right breast.        Past Medical History:   Diagnosis Date   • Anxiety    • Asthma    • Cancer 2017    Right   • GERD (gastroesophageal reflux disease) 6/23/2017   • Herpes     + HSv no outbreaks   • HTN (hypertension) 6/23/2017   • Hypertension    • Lichen sclerosus    • Lichen sclerosus et atrophicus 6/23/2017   • Migraine      Past Surgical History:   Procedure Laterality " Date   • BREAST BIOPSY Right 2017   •  SECTION     • HAND SURGERY         MEDICATIONS    Current Outpatient Prescriptions:   •  aspirin 81 MG chewable tablet, Chew 81 mg Daily., Disp: , Rfl:   •  clobetasol (TEMOVATE) 0.05 % ointment, Apply  topically 2 (Two) Times a Day. Apply BID x 14 days, then QD x 14 days, then 3 times a week as needed for itching, Disp: 60 g, Rfl: 2  •  fluticasone-salmeterol (ADVAIR) 100-50 MCG/DOSE DISKUS, Inhale 2 (Two) Times a Day., Disp: , Rfl:   •  gabapentin (NEURONTIN) 400 MG capsule, , Disp: , Rfl: 0  •  lidocaine-prilocaine (EMLA) 2.5-2.5 % cream, Apply  topically 1 (One) Time. APPLY BEFORE LEAVING HOME DAY OF SURGERY, Disp: 30 g, Rfl: 0  •  Multiple Vitamins-Minerals (MULTIVITAMIN WITH MINERALS) tablet tablet, Take 1 tablet by mouth Daily., Disp: , Rfl:   •  rosuvastatin (CRESTOR) 5 MG tablet, Take 5 mg by mouth Daily., Disp: , Rfl:   •  Sumatriptan-Naproxen Sodium (TREXIMET PO), Take  by mouth., Disp: , Rfl:     ALLERGIES:   No Known Allergies    SOCIAL HISTORY:       Social History     Social History   • Marital status:      Spouse name: N/A   • Number of children: 1   • Years of education: N/A     Occupational History   •  [employer]     Social History Main Topics   • Smoking status: Former Smoker   • Smokeless tobacco: Never Used   • Alcohol use No   • Drug use: No   • Sexual activity: Not Currently     Partners: Male     Birth control/ protection: Condom     Other Topics Concern   • Not on file     Social History Narrative         FAMILY HISTORY:  Family History   Problem Relation Age of Onset   • Breast cancer Mother 70   • Hearing loss Mother    • Hyperlipidemia Mother    • Diabetes Father    • Asthma Sister    • Clotting disorder Sister    • COPD Sister    • Pulmonary embolism Sister    • Deep vein thrombosis Brother    • Pulmonary embolism Brother        REVIEW OF SYSTEMS:  A comprehensive review of systems was performed and was negative  "except as mentioned above in the history of present illness.    Objective    Vitals:    08/02/17 0750   BP: 116/74   Pulse: 75   Resp: 18   Temp: 97.7 °F (36.5 °C)   TempSrc: Oral   SpO2: 99%   Weight: 196 lb (88.9 kg)   Height: 61.22\" (155.5 cm)   PainSc: 0-No pain     Current Status 8/2/2017   ECOG score 0      PHYSICAL EXAM:    GENERAL:  Well-developed, well-nourished in no acute distress.   SKIN:  Warm, dry without rashes, purpura or petechiae.  HEAD:  Normocephalic.  EYES:  Pupils equal, round and reactive to light.  EOMs intact.  Conjunctivae normal.  EARS:  Hearing intact.  NOSE:  Septum midline.  No excoriations or nasal discharge.  MOUTH:  Tongue is well-papillated; no stomatitis or ulcers.  Lips normal.  THROAT:  Oropharynx without lesions or exudates.  NECK:  Supple with good range of motion; no thyromegaly or masses, no JVD.  LYMPHATICS:  No cervical, supraclavicular, axillary or inguinal adenopathy.  CHEST:  Lungs clear to percussion and auscultation. Good airflow.  BREAST: The left breast is without masses no abnormalities palpated.  The right breast shows changes from recent biopsy, no palpable abnormality.  CARDIAC:  Regular rate and rhythm without murmurs, rubs or gallops. Normal S1,S2.  ABDOMEN:  Soft, nontender with no organomegaly or masses.  EXTREMITIES:  No clubbing, cyanosis or edema.  NEUROLOGICAL:  Cranial Nerves II-XII grossly intact.  No focal neurological deficits.  PSYCHIATRIC:  Normal affect and mood.      RECENT LABS:        WBC   Date Value Ref Range Status   08/02/2017 8.00 4.00 - 10.00 10*3/mm3 Final     Hemoglobin   Date Value Ref Range Status   08/02/2017 15.8 (H) 11.5 - 14.9 g/dL Final     Platelets   Date Value Ref Range Status   08/02/2017 165 150 - 375 10*3/mm3 Final     Radiographic and pathology records reviewed as described in the history of present illness above in addition to hypercoagulable labs from 9/24/15 as noted above in the history present " illness.    Assessment/Plan    1. Clinical stage IA ([id number]) right breast cancer: The patient had a mammographically detected nonpalpable abnormality of the right breast measuring 1.3 cm by ultrasound in the 10 o'clock position with biopsy 7/3/17 showing an invasive lobular carcinoma largest size measuring 9 mm, grade 1, ER positive (95%), NC positive (95%), HER-2/tanya 0 (IHC).  There were foci of associated LCIS.  There was no lymphatic nor perineural invasion.  Oncotype DX testing was sent revealing a recurrent score in a low risk range at 12 (10 year recurrence risk of 8% with treatment with tamoxifen, 1% benefit from adjuvant chemotherapy) and confirmed ER positive, NC positive, HER-2/tanya negative.  MRI of the breasts 7 sets 13/17 showed the biopsy-proven right breast malignancy measuring 1.9 cm with no axillary lymph node enlargement.  There was however in the middle third of the right breast a cluster of microcalcifications that appear to have increased over time.  An MRI guided biopsy of this area was performed 7/25/17 in the 9:30 position revealing an invasive lobular carcinoma, 4 mm, grade 2, ER positive (greater than 90%), NC positive (90%), HER-2/tanya 1+ recurrent sees IHC) with associated LCIS.  The patient was seen in the genetics clinic and underwent panel testing with results currently still pending.  She has a family history of malignancy with breast cancer in her mother at age 70.  The patient has been seen by Dr. Kinsey who has discussed surgical management of her disease.  At this point with evidence of multifocal involvement of the breast, it is anticipated that she will require a right mastectomy with sentinel lymph node biopsy that is already scheduled for 8/23/17.  She did see Dr. Waterhouse as well in regards to reconstruction.    I did review the patient's imaging and pathologic findings with her at length today.  We discussed the meaning of her Oncotype DX score which is in the low risk  category at 12 indicating a 10 year recurrence risk of distant metastasis of 8% with tamoxifen alone.  We discussed that adjuvant chemotherapy would provide only approximately a 1% additional benefit.  At this time, I have not recommended for her to receive adjuvant chemotherapy and we will focus on adjuvant endocrine therapy.  We did however discuss the need to follow-up her pathologic findings at the time of surgery including her lymph node status.  Clinically we presume this is negative currently however that may change.  If she was node positive, but likely wouldn't change our recommendation for adjuvant chemotherapy and she is aware of that issue.  I will see her back approximately 2 weeks postoperatively to discuss her pathologic findings and to finalize our plans for her adjuvant therapy.  As she will be undergoing mastectomy, radiation therapy is not anticipated at this point, again assuming we do not have any evidence of leigh involvement.  We did discuss briefly today adjuvant endocrine therapy.  There is some significant concern in her situation regarding her thrombotic risk with tamoxifen.  She is premenopausal and therefore tamoxifen would be our intended treatment for adjuvant endocrine therapy.  Thrombotic risk will be discussed further below.  We may have to consider a strategy of thromboprophylaxis throughout treatment.  2. Family history of thrombosis: The patient has no personal history of thrombosis.  She has a significant family history of thrombosis however with a brother who developed a pulmonary embolism in his 30s, a brother developed a DVT at age 44, and a sister with a pulmonary embolism at age 55, all unprovoked.  The patient herself did undergo a hypercoagulable evaluation performed by her primary care physician [facility] 9/24/15 which showed negative prothrombin gene mutation analysis, negative factor V Leiden gene mutation analysis, negative lupus anticoagulant, negative  anticardiolipin antibodies, normal homocystine, protein C activity 168%, protein S free 107%.  We will ask her to return tomorrow to draw additional labs to complete her evaluation including beta-2 GP 1 antibody panel, protein S total antigen and protein S activity, anti-thrombin 3 activity.  She does continue chronically on a prophylactic aspirin 81 mg a day.  This is certainly reasonable given her family history.  We did discuss her intermittent history of smoking and discussed that smoking increases her thrombotic risk further and I advised her to stop smoking altogether.  We will review her hypercoagulable studies when she returns and discuss implications in the use of tamoxifen as adjuvant endocrine therapy.  We will discuss whether she may require a prophylactic dose of a novel oral anticoagulant versus continuation of her low dose aspirin.    Plan:  1. The patient will return tomorrow to draw additional labs to complete her hypercoagulable evaluation with beta-2 GP 1 antibody panel, protein S total antigen and protein S activity as well as anti-thrombin 3 activity.  2. Genetics panel test result pending  3. Proceed as scheduled with right mastectomy and sentinel lymph node biopsy with Dr. Kinsey 8/23/17.  4. 2 weeks postoperatively, scheduled follow-up visit with CBC, CMP to review pathologic findings and to further discuss recommendations for adjuvant therapy.

## 2017-08-02 NOTE — PROGRESS NOTES
"Pt declined social work appointment today.  She said she's doing well.  On the Distress Questionnaire she said her distress was \"mild\" and wrote \"have not said anything to stress me out yet.\" She took support group info and contact info, should feel free to call.  "

## 2017-08-02 NOTE — TELEPHONE ENCOUNTER
Ms. Suazo surgery changed to:  Rt Total Mastectomy, Rt Axilla SN Bx, Oncoplastic Closure Rt, Lt Mastopexy Dr Waterhouse Mary Jo to assist    Arrive       9:30  SI           11:30  Surgery  12:30    PAT 8/14/17 at 2:30  Return to see Dr MARISCAL 9/5/17 arrive 9:15    kdl

## 2017-08-03 ENCOUNTER — TELEPHONE (OUTPATIENT)
Dept: GENERAL RADIOLOGY | Facility: HOSPITAL | Age: 52
End: 2017-08-03

## 2017-08-03 ENCOUNTER — LAB (OUTPATIENT)
Dept: LAB | Facility: HOSPITAL | Age: 52
End: 2017-08-03

## 2017-08-03 DIAGNOSIS — C50.411 BREAST CANCER OF UPPER-OUTER QUADRANT OF RIGHT FEMALE BREAST (HCC): ICD-10-CM

## 2017-08-03 DIAGNOSIS — Z82.49 FAMILY HISTORY OF DVT: ICD-10-CM

## 2017-08-03 LAB
ALBUMIN SERPL-MCNC: 4.3 G/DL (ref 3.5–5.2)
ALBUMIN/GLOB SERPL: 1.5 G/DL (ref 1.1–2.4)
ALP SERPL-CCNC: 82 U/L (ref 38–116)
ALT SERPL W P-5'-P-CCNC: 23 U/L (ref 0–33)
ANION GAP SERPL CALCULATED.3IONS-SCNC: 12 MMOL/L
AST SERPL-CCNC: 20 U/L (ref 0–32)
BILIRUB SERPL-MCNC: 1.1 MG/DL (ref 0.1–1.2)
BUN BLD-MCNC: 15 MG/DL (ref 6–20)
BUN/CREAT SERPL: 21.1 (ref 7.3–30)
CALCIUM SPEC-SCNC: 9.3 MG/DL (ref 8.5–10.2)
CHLORIDE SERPL-SCNC: 100 MMOL/L (ref 98–107)
CO2 SERPL-SCNC: 27 MMOL/L (ref 22–29)
CREAT BLD-MCNC: 0.71 MG/DL (ref 0.6–1.1)
GFR SERPL CREATININE-BSD FRML MDRD: 87 ML/MIN/1.73
GLOBULIN UR ELPH-MCNC: 2.8 GM/DL (ref 1.8–3.5)
GLUCOSE BLD-MCNC: 123 MG/DL (ref 74–124)
POTASSIUM BLD-SCNC: 4.4 MMOL/L (ref 3.5–4.7)
PROT SERPL-MCNC: 7.1 G/DL (ref 6.3–8)
SODIUM BLD-SCNC: 139 MMOL/L (ref 134–145)

## 2017-08-03 PROCEDURE — 85300 ANTITHROMBIN III ACTIVITY: CPT | Performed by: INTERNAL MEDICINE

## 2017-08-03 PROCEDURE — 85305 CLOT INHIBIT PROT S TOTAL: CPT | Performed by: INTERNAL MEDICINE

## 2017-08-03 PROCEDURE — 85306 CLOT INHIBIT PROT S FREE: CPT | Performed by: INTERNAL MEDICINE

## 2017-08-03 PROCEDURE — 80053 COMPREHEN METABOLIC PANEL: CPT | Performed by: INTERNAL MEDICINE

## 2017-08-03 PROCEDURE — 36415 COLL VENOUS BLD VENIPUNCTURE: CPT | Performed by: INTERNAL MEDICINE

## 2017-08-03 NOTE — TELEPHONE ENCOUNTER
----- Message from Zhou Gallegos Jr., MD sent at 8/2/2017  6:09 PM EDT -----  Regarding: return for lab draw tomorrow  Please schedule the patient to return tomorrow for additional lab work including beta-2 GP 1 antibody panel, protein S total antigen and protein S activity as well as anti-thrombin 3 activity (I have placed lab orders already).

## 2017-08-04 LAB
AT III PPP CHRO-ACNC: 96 % (ref 90–134)
PROT S ACT/NOR PPP: 83 % (ref 70–127)

## 2017-08-05 LAB
B2 GLYCOPROT1 IGA SER-ACNC: <9 GPI IGA UNITS (ref 0–25)
B2 GLYCOPROT1 IGG SER-ACNC: <9 GPI IGG UNITS (ref 0–20)
B2 GLYCOPROT1 IGM SER-ACNC: <9 GPI IGM UNITS (ref 0–32)

## 2017-08-06 LAB
PROT S FREE PPP-ACNC: 109 % (ref 57–157)
PROT S PPP-ACNC: 125 % (ref 60–150)

## 2017-08-09 ENCOUNTER — TELEPHONE (OUTPATIENT)
Dept: ONCOLOGY | Facility: CLINIC | Age: 52
End: 2017-08-09

## 2017-08-09 ENCOUNTER — TELEPHONE (OUTPATIENT)
Dept: SURGERY | Facility: CLINIC | Age: 52
End: 2017-08-09

## 2017-08-09 NOTE — TELEPHONE ENCOUNTER
----- Message from Zhou Gallegos Jr., MD sent at 8/9/2017  9:23 AM EDT -----  Please let them know it will not all be back until sometime next week.  ----- Message -----     From: Samaria Cruz RN     Sent: 8/9/2017   8:13 AM       To: MD Dr. El Richardson Jr., Dr. Kinsey called this morning and is requesting a note or dictation or info of some sort from you regarding the lab work that patient came back and had done.      Thanks

## 2017-08-09 NOTE — TELEPHONE ENCOUNTER
Received call from Dr. Kinsey's office requesting a note from Dr. Gallegos regarding antithrombin labs.  Message sent to Dr. Gallegos.

## 2017-08-14 ENCOUNTER — HOSPITAL ENCOUNTER (OUTPATIENT)
Dept: GENERAL RADIOLOGY | Facility: HOSPITAL | Age: 52
Discharge: HOME | End: 2017-08-14
Admitting: SURGERY

## 2017-08-14 ENCOUNTER — APPOINTMENT (OUTPATIENT)
Dept: PREADMISSION TESTING | Facility: HOSPITAL | Age: 52
End: 2017-08-14

## 2017-08-14 ENCOUNTER — RESEARCH ENCOUNTER (OUTPATIENT)
Dept: ONCOLOGY | Facility: HOSPITAL | Age: 52
End: 2017-08-14

## 2017-08-14 VITALS
WEIGHT: 197.4 LBS | HEART RATE: 79 BPM | BODY MASS INDEX: 37.27 KG/M2 | RESPIRATION RATE: 16 BRPM | SYSTOLIC BLOOD PRESSURE: 128 MMHG | DIASTOLIC BLOOD PRESSURE: 79 MMHG | OXYGEN SATURATION: 96 % | TEMPERATURE: 97 F | HEIGHT: 61 IN

## 2017-08-14 DIAGNOSIS — C50.411 MALIGNANT NEOPLASM OF UPPER-OUTER QUADRANT OF RIGHT FEMALE BREAST (HCC): ICD-10-CM

## 2017-08-14 LAB
ALBUMIN SERPL-MCNC: 3.9 G/DL (ref 3.5–5.2)
ALBUMIN/GLOB SERPL: 1.1 G/DL
ALP SERPL-CCNC: 67 U/L (ref 39–117)
ALT SERPL W P-5'-P-CCNC: 17 U/L (ref 1–33)
ANION GAP SERPL CALCULATED.3IONS-SCNC: 13.3 MMOL/L
AST SERPL-CCNC: 15 U/L (ref 1–32)
BILIRUB SERPL-MCNC: 0.7 MG/DL (ref 0.1–1.2)
BUN BLD-MCNC: 15 MG/DL (ref 6–20)
BUN/CREAT SERPL: 21.4 (ref 7–25)
CALCIUM SPEC-SCNC: 9.3 MG/DL (ref 8.6–10.5)
CHLORIDE SERPL-SCNC: 102 MMOL/L (ref 98–107)
CO2 SERPL-SCNC: 23.7 MMOL/L (ref 22–29)
CREAT BLD-MCNC: 0.7 MG/DL (ref 0.57–1)
DEPRECATED RDW RBC AUTO: 43.1 FL (ref 37–54)
ERYTHROCYTE [DISTWIDTH] IN BLOOD BY AUTOMATED COUNT: 13 % (ref 11.7–13)
GFR SERPL CREATININE-BSD FRML MDRD: 89 ML/MIN/1.73
GLOBULIN UR ELPH-MCNC: 3.4 GM/DL
GLUCOSE BLD-MCNC: 110 MG/DL (ref 65–99)
HCT VFR BLD AUTO: 42.9 % (ref 35.6–45.5)
HGB BLD-MCNC: 14.1 G/DL (ref 11.9–15.5)
MCH RBC QN AUTO: 29.7 PG (ref 26.9–32)
MCHC RBC AUTO-ENTMCNC: 32.9 G/DL (ref 32.4–36.3)
MCV RBC AUTO: 90.5 FL (ref 80.5–98.2)
PLATELET # BLD AUTO: 232 10*3/MM3 (ref 140–500)
PMV BLD AUTO: 10.6 FL (ref 6–12)
POTASSIUM BLD-SCNC: 4.2 MMOL/L (ref 3.5–5.2)
PROT SERPL-MCNC: 7.3 G/DL (ref 6–8.5)
RBC # BLD AUTO: 4.74 10*6/MM3 (ref 3.9–5.2)
SODIUM BLD-SCNC: 139 MMOL/L (ref 136–145)
WBC NRBC COR # BLD: 7.39 10*3/MM3 (ref 4.5–10.7)

## 2017-08-14 PROCEDURE — 93010 ELECTROCARDIOGRAM REPORT: CPT | Performed by: INTERNAL MEDICINE

## 2017-08-14 PROCEDURE — 85027 COMPLETE CBC AUTOMATED: CPT | Performed by: SURGERY

## 2017-08-14 PROCEDURE — 80053 COMPREHEN METABOLIC PANEL: CPT | Performed by: SURGERY

## 2017-08-14 PROCEDURE — 71020 HC CHEST PA AND LATERAL: CPT

## 2017-08-14 PROCEDURE — 36415 COLL VENOUS BLD VENIPUNCTURE: CPT

## 2017-08-14 PROCEDURE — 93005 ELECTROCARDIOGRAM TRACING: CPT

## 2017-08-14 NOTE — RESEARCH
[facility]  CONSULTING IN BLOOD DISORDERS & CANCER  Melquiades Ibarra, Herminio Mock, El Gaitan,  Tomas, Liv, Ruddy, Keshav & Janny  [address], [address]  [place] [address]  Phone: [phone]  Fax: [phone]      Patient Name:  Jessica Suazo  YOB: 1965  Patient Age:  51 y.o.  Patient's Sex:  female    Date of Service:  08/14/2017    Protocol:  [id number]: Next Generation Sequencing (NGS)-based assay(s) of nucleic acids ([place]) isolated from peripheral blood and tumor tissue                                       RESEARCH INFORMED CONSENT                                     The consent form was explained and summarized by the investigator and/or coordinator.  The subject was given a copy of the consent form to read and encouraged to take the copy home to share with family and/or significant others.      The subject was given the opportunity to ask questions of the coordinator and the investigator.      The subject has read and verbalized an understanding of the informed consent.  All questions and concerns were addressed.  she signed and was given a copy of the written informed consent prior to performing any study-related procedures.      SUSAN Mckeon RN    08/14/17, 4:18 PM

## 2017-08-14 NOTE — DISCHARGE INSTRUCTIONS
PLEASE ARRIVE AT 9:30 AM ON 8/23/2017      Take the following medications the morning of surgery with a small sip of water:  NO MEDS        General Instructions:  • Do not eat solid food after midnight the night before surgery.  • You may drink clear liquids day of surgery but must stop at least one hour before your hospital arrival time.  • It is beneficial for you to have a clear drink that contains carbohydrates the day of surgery.  We suggest a 20 ounce bottle of Gatorade or Powerade for non-diabetic patients or a 20 ounce bottle of G2 or Powerade Zero for diabetic patients. (Pediatric patients, are not advised to drink a 20 ounce carbohydrate drink)    Clear liquids are liquids you can see through.  Nothing red in color.     Plain water                               Sports drinks  Sodas                                   Gelatin (Jell-O)  Fruit juices without pulp such as white grape juice and apple juice  Popsicles that contain no fruit or yogurt  Tea or coffee (no cream or milk added)  Gatorade / Powerade  G2 / Powerade Zero    • Infants may have breast milk up to four hours before surgery.  • Infants drinking formula may drink formula up to six hours before surgery.   • Patients who avoid smoking, chewing tobacco and alcohol for 4 weeks prior to surgery have a reduced risk of post-operative complications.  Quit smoking as many days before surgery as you can.  • Do not smoke, use chewing tobacco or drink alcohol the day of surgery.   • If applicable bring your C-PAP/ BI-PAP machine.  • Bring any papers given to you in the doctor’s office.  • Wear clean comfortable clothes and socks.  • Do not wear contact lenses or make-up.  Bring a case for your glasses.   • Bring crutches or walker if applicable.  • Leave all other valuables and jewelry at home.  • The Pre-Admission Testing nurse will instruct you to bring medications if unable to obtain an accurate list in Pre-Admission Testing.            Preventing a  Surgical Site Infection:  • For 2 to 3 days before surgery, avoid shaving with a razor because the razor can irritate skin and make it easier to develop an infection.  • The night prior to surgery sleep in a clean bed with clean clothing.  Do not allow pets to sleep with you.  • Shower on the morning of surgery using a fresh bar of anti-bacterial soap (such as Dial) and clean washcloth.  Dry with a clean towel and dress in clean clothing.  • Ask your surgeon if you will be receiving antibiotics prior to surgery.  • Make sure you, your family, and all healthcare providers clean their hands with soap and water or an alcohol based hand  before caring for you or your wound.    Day of surgery:  Upon arrival, a Pre-op nurse and Anesthesiologist will review your health history, obtain vital signs, and answer questions you may have.  The only belongings needed at this time will be your home medications and if applicable your C-PAP/BI-PAP machine.  If you are staying overnight your family can leave the rest of your belongings in the car and bring them to your room later.  A Pre-op nurse will start an IV and you may receive medication in preparation for surgery, including something to help you relax.  Your family will be able to see you in the Pre-op area.  While you are in surgery your family should notify the waiting room  if they leave the waiting room area and provide a contact phone number.    Please be aware that surgery does come with discomfort.  We want to make every effort to control your discomfort so please discuss any uncontrolled symptoms with your nurse.   Your doctor will most likely have prescribed pain medications.      If you are going home after surgery you will receive individualized written care instructions before being discharged.  A responsible adult must drive you to and from the hospital on the day of your surgery and stay with you for 24 hours.    If you are staying overnight  following surgery, you will be transported to your hospital room following the recovery period.  [facility] has all private rooms.    If you have any questions please call Pre-Admission Testing at [phone].  Deductibles and co-payments are collected on the day of service. Please be prepared to pay the required co-pay, deductible or deposit on the day of service as defined by your plan.

## 2017-08-15 ENCOUNTER — TELEPHONE (OUTPATIENT)
Dept: SURGERY | Facility: CLINIC | Age: 52
End: 2017-08-15

## 2017-08-16 ENCOUNTER — TELEPHONE (OUTPATIENT)
Dept: SURGERY | Facility: CLINIC | Age: 52
End: 2017-08-16

## 2017-08-17 ENCOUNTER — TELEPHONE (OUTPATIENT)
Dept: ONCOLOGY | Facility: HOSPITAL | Age: 52
End: 2017-08-17

## 2017-08-17 ENCOUNTER — TELEPHONE (OUTPATIENT)
Dept: SURGERY | Facility: CLINIC | Age: 52
End: 2017-08-17

## 2017-08-17 NOTE — TELEPHONE ENCOUNTER
Bertha with Dr. Kinsey's office calling because pt will be having surgery next week and there are some coag labs that were pending from our office and she would like for Dr. Gallegos to look at them and see if pt would be OK for surgery. Labs reviewed with Dr. Gallegos. Per Dr. Gallegos, as long as pt will be discharged and ambulating soon after surgery (next day) there should be no need for any prophylactic anticoagulation. If she will be in the hospital for an extended period of time, pt may need to start on prophylactic Lovenox. Informed Xochitl and chintan v/u.

## 2017-08-17 NOTE — TELEPHONE ENCOUNTER
Called [facility] to follow up with Dr. Arteaga on hypercoag work-up for Ms. Suazo.   Ann relayed to us; Dr. Arteaga review labs, everything is normal, okay to proceed w/surgery without coag therapy as long as patient is not ambulatory for an extended period of time/several days. Labs showed no concern.     lml

## 2017-08-23 ENCOUNTER — HOSPITAL ENCOUNTER (OUTPATIENT)
Dept: NUCLEAR MEDICINE | Facility: HOSPITAL | Age: 52
Discharge: HOME | End: 2017-08-23
Attending: SURGERY

## 2017-08-23 ENCOUNTER — ANESTHESIA (OUTPATIENT)
Dept: PERIOP | Facility: HOSPITAL | Age: 52
End: 2017-08-23

## 2017-08-23 ENCOUNTER — HOSPITAL ENCOUNTER (OUTPATIENT)
Facility: HOSPITAL | Age: 52
Setting detail: HOSPITAL OUTPATIENT SURGERY
Discharge: HOME | End: 2017-08-24
Attending: SURGERY | Admitting: SURGERY

## 2017-08-23 ENCOUNTER — ANESTHESIA EVENT (OUTPATIENT)
Dept: PERIOP | Facility: HOSPITAL | Age: 52
End: 2017-08-23

## 2017-08-23 DIAGNOSIS — C50.411 BREAST CANCER OF UPPER-OUTER QUADRANT OF RIGHT FEMALE BREAST (HCC): ICD-10-CM

## 2017-08-23 DIAGNOSIS — C50.411 MALIGNANT NEOPLASM OF UPPER-OUTER QUADRANT OF RIGHT FEMALE BREAST (HCC): ICD-10-CM

## 2017-08-23 LAB
B-HCG UR QL: NEGATIVE
INTERNAL NEGATIVE CONTROL: NEGATIVE
INTERNAL POSITIVE CONTROL: POSITIVE
Lab: NORMAL

## 2017-08-23 PROCEDURE — A9541 TC99M SULFUR COLLOID: HCPCS | Performed by: SURGERY

## 2017-08-23 PROCEDURE — 25010000002 PROPOFOL 10 MG/ML EMULSION: Performed by: NURSE ANESTHETIST, CERTIFIED REGISTERED

## 2017-08-23 PROCEDURE — 25010000002 DEXAMETHASONE PER 1 MG: Performed by: NURSE ANESTHETIST, CERTIFIED REGISTERED

## 2017-08-23 PROCEDURE — 19303 MAST SIMPLE COMPLETE: CPT | Performed by: SURGERY

## 2017-08-23 PROCEDURE — 25010000002 NEOSTIGMINE PER 0.5 MG: Performed by: NURSE ANESTHETIST, CERTIFIED REGISTERED

## 2017-08-23 PROCEDURE — 0 TECHNETIUM FILTERED SULFUR COLLOID: Performed by: SURGERY

## 2017-08-23 PROCEDURE — 25010000002 SUCCINYLCHOLINE PER 20 MG: Performed by: NURSE ANESTHETIST, CERTIFIED REGISTERED

## 2017-08-23 PROCEDURE — 25010000002 ONDANSETRON PER 1 MG: Performed by: NURSE ANESTHETIST, CERTIFIED REGISTERED

## 2017-08-23 PROCEDURE — 78195 LYMPH SYSTEM IMAGING: CPT | Performed by: SURGERY

## 2017-08-23 PROCEDURE — 88307 TISSUE EXAM BY PATHOLOGIST: CPT | Performed by: SURGERY

## 2017-08-23 PROCEDURE — 25010000002 MIDAZOLAM PER 1 MG: Performed by: ANESTHESIOLOGY

## 2017-08-23 PROCEDURE — 25010000002 HYDROMORPHONE PER 4 MG: Performed by: NURSE ANESTHETIST, CERTIFIED REGISTERED

## 2017-08-23 PROCEDURE — 25010000002 EPINEPHRINE PER 0.1 MG: Performed by: SURGERY

## 2017-08-23 PROCEDURE — 38792 RA TRACER ID OF SENTINL NODE: CPT

## 2017-08-23 PROCEDURE — 25010000002 FENTANYL CITRATE (PF) 100 MCG/2ML SOLUTION: Performed by: NURSE ANESTHETIST, CERTIFIED REGISTERED

## 2017-08-23 PROCEDURE — C1789 PROSTHESIS, BREAST, IMP: HCPCS | Performed by: SURGERY

## 2017-08-23 PROCEDURE — 88312 SPECIAL STAINS GROUP 1: CPT | Performed by: SURGERY

## 2017-08-23 PROCEDURE — 88331 PATH CONSLTJ SURG 1 BLK 1SPC: CPT | Performed by: SURGERY

## 2017-08-23 PROCEDURE — 25010000003 CEFAZOLIN IN DEXTROSE 2-4 GM/100ML-% SOLUTION: Performed by: SURGERY

## 2017-08-23 PROCEDURE — 25010000003 CEFAZOLIN IN DEXTROSE 2-4 GM/100ML-% SOLUTION: Performed by: ANESTHESIOLOGY

## 2017-08-23 PROCEDURE — 38525 BIOPSY/REMOVAL LYMPH NODES: CPT | Performed by: SURGERY

## 2017-08-23 DEVICE — GRFT TISS ALLODERM CONTOUR RTM PERF 10.7X21.5: Type: IMPLANTABLE DEVICE | Site: BREAST | Status: FUNCTIONAL

## 2017-08-23 DEVICE — SILTEX MEDIUM HEIGHT TISSUE EXPANDER STYLE 9200, 650CC
Type: IMPLANTABLE DEVICE | Site: BREAST | Status: FUNCTIONAL
Brand: MENTOR CPX 4 BREAST TISSUE EXPANDER WITH SUTURE TABS

## 2017-08-23 RX ORDER — DEXTROSE, SODIUM CHLORIDE, AND POTASSIUM CHLORIDE 5; .45; .15 G/100ML; G/100ML; G/100ML
75 INJECTION INTRAVENOUS CONTINUOUS
Status: DISCONTINUED | OUTPATIENT
Start: 2017-08-23 | End: 2017-08-24 | Stop reason: HOSPADM

## 2017-08-23 RX ORDER — GABAPENTIN 400 MG/1
400 CAPSULE ORAL AS NEEDED
Status: DISCONTINUED | OUTPATIENT
Start: 2017-08-23 | End: 2017-08-24 | Stop reason: HOSPADM

## 2017-08-23 RX ORDER — NEOMYCIN AND POLYMYXIN B SULFATES 40; 200000 MG/ML; [USP'U]/ML
SOLUTION IRRIGATION AS NEEDED
Status: DISCONTINUED | OUTPATIENT
Start: 2017-08-23 | End: 2017-08-23 | Stop reason: HOSPADM

## 2017-08-23 RX ORDER — LIDOCAINE HYDROCHLORIDE 20 MG/ML
INJECTION, SOLUTION INFILTRATION; PERINEURAL AS NEEDED
Status: DISCONTINUED | OUTPATIENT
Start: 2017-08-23 | End: 2017-08-23 | Stop reason: SURG

## 2017-08-23 RX ORDER — MAGNESIUM HYDROXIDE 1200 MG/15ML
LIQUID ORAL AS NEEDED
Status: DISCONTINUED | OUTPATIENT
Start: 2017-08-23 | End: 2017-08-23 | Stop reason: HOSPADM

## 2017-08-23 RX ORDER — HYDROCODONE BITARTRATE AND ACETAMINOPHEN 5; 325 MG/1; MG/1
2 TABLET ORAL EVERY 4 HOURS PRN
Status: DISCONTINUED | OUTPATIENT
Start: 2017-08-23 | End: 2017-08-24 | Stop reason: HOSPADM

## 2017-08-23 RX ORDER — SODIUM CHLORIDE 0.9 % (FLUSH) 0.9 %
1-10 SYRINGE (ML) INJECTION AS NEEDED
Status: DISCONTINUED | OUTPATIENT
Start: 2017-08-23 | End: 2017-08-23 | Stop reason: HOSPADM

## 2017-08-23 RX ORDER — BUPIVACAINE HYDROCHLORIDE AND EPINEPHRINE 2.5; 5 MG/ML; UG/ML
INJECTION, SOLUTION INFILTRATION; PERINEURAL AS NEEDED
Status: DISCONTINUED | OUTPATIENT
Start: 2017-08-23 | End: 2017-08-23 | Stop reason: HOSPADM

## 2017-08-23 RX ORDER — DIPHENHYDRAMINE HCL 25 MG
25 CAPSULE ORAL EVERY 6 HOURS PRN
Status: DISCONTINUED | OUTPATIENT
Start: 2017-08-23 | End: 2017-08-24 | Stop reason: HOSPADM

## 2017-08-23 RX ORDER — DIAZEPAM 5 MG/1
10 TABLET ORAL ONCE AS NEEDED
Status: DISCONTINUED | OUTPATIENT
Start: 2017-08-23 | End: 2017-08-23 | Stop reason: HOSPADM

## 2017-08-23 RX ORDER — DIPHENHYDRAMINE HYDROCHLORIDE 50 MG/ML
12.5 INJECTION INTRAMUSCULAR; INTRAVENOUS
Status: DISCONTINUED | OUTPATIENT
Start: 2017-08-23 | End: 2017-08-23 | Stop reason: HOSPADM

## 2017-08-23 RX ORDER — HYDROMORPHONE HYDROCHLORIDE 1 MG/ML
0.5 INJECTION, SOLUTION INTRAMUSCULAR; INTRAVENOUS; SUBCUTANEOUS
Status: DISCONTINUED | OUTPATIENT
Start: 2017-08-23 | End: 2017-08-24 | Stop reason: HOSPADM

## 2017-08-23 RX ORDER — MIDAZOLAM HYDROCHLORIDE 1 MG/ML
2 INJECTION INTRAMUSCULAR; INTRAVENOUS
Status: DISCONTINUED | OUTPATIENT
Start: 2017-08-23 | End: 2017-08-23 | Stop reason: HOSPADM

## 2017-08-23 RX ORDER — WOUND DRESSING ADHESIVE - LIQUID
LIQUID MISCELLANEOUS AS NEEDED
Status: DISCONTINUED | OUTPATIENT
Start: 2017-08-23 | End: 2017-08-23 | Stop reason: HOSPADM

## 2017-08-23 RX ORDER — SODIUM CHLORIDE, SODIUM LACTATE, POTASSIUM CHLORIDE, CALCIUM CHLORIDE 600; 310; 30; 20 MG/100ML; MG/100ML; MG/100ML; MG/100ML
100 INJECTION, SOLUTION INTRAVENOUS CONTINUOUS
Status: DISCONTINUED | OUTPATIENT
Start: 2017-08-23 | End: 2017-08-23

## 2017-08-23 RX ORDER — ACETAMINOPHEN 325 MG/1
650 TABLET ORAL EVERY 4 HOURS PRN
Status: DISCONTINUED | OUTPATIENT
Start: 2017-08-23 | End: 2017-08-24 | Stop reason: HOSPADM

## 2017-08-23 RX ORDER — ONDANSETRON 2 MG/ML
4 INJECTION INTRAMUSCULAR; INTRAVENOUS ONCE AS NEEDED
Status: DISCONTINUED | OUTPATIENT
Start: 2017-08-23 | End: 2017-08-23 | Stop reason: HOSPADM

## 2017-08-23 RX ORDER — ONDANSETRON 4 MG/1
4 TABLET, ORALLY DISINTEGRATING ORAL EVERY 6 HOURS PRN
Status: DISCONTINUED | OUTPATIENT
Start: 2017-08-23 | End: 2017-08-24 | Stop reason: HOSPADM

## 2017-08-23 RX ORDER — HYDRALAZINE HYDROCHLORIDE 20 MG/ML
5 INJECTION INTRAMUSCULAR; INTRAVENOUS
Status: DISCONTINUED | OUTPATIENT
Start: 2017-08-23 | End: 2017-08-23 | Stop reason: HOSPADM

## 2017-08-23 RX ORDER — PROMETHAZINE HYDROCHLORIDE 25 MG/1
25 TABLET ORAL ONCE AS NEEDED
Status: DISCONTINUED | OUTPATIENT
Start: 2017-08-23 | End: 2017-08-23 | Stop reason: HOSPADM

## 2017-08-23 RX ORDER — SODIUM CHLORIDE 9 MG/ML
INJECTION, SOLUTION INTRAVENOUS CONTINUOUS PRN
Status: DISCONTINUED | OUTPATIENT
Start: 2017-08-23 | End: 2017-08-23 | Stop reason: HOSPADM

## 2017-08-23 RX ORDER — OXYCODONE AND ACETAMINOPHEN 7.5; 325 MG/1; MG/1
1 TABLET ORAL ONCE AS NEEDED
Status: DISCONTINUED | OUTPATIENT
Start: 2017-08-23 | End: 2017-08-23 | Stop reason: HOSPADM

## 2017-08-23 RX ORDER — NALOXONE HCL 0.4 MG/ML
0.1 VIAL (ML) INJECTION
Status: DISCONTINUED | OUTPATIENT
Start: 2017-08-23 | End: 2017-08-24 | Stop reason: HOSPADM

## 2017-08-23 RX ORDER — FENTANYL CITRATE 50 UG/ML
INJECTION, SOLUTION INTRAMUSCULAR; INTRAVENOUS AS NEEDED
Status: DISCONTINUED | OUTPATIENT
Start: 2017-08-23 | End: 2017-08-23 | Stop reason: SURG

## 2017-08-23 RX ORDER — DIAZEPAM 5 MG/1
5 TABLET ORAL EVERY 8 HOURS PRN
Status: DISCONTINUED | OUTPATIENT
Start: 2017-08-23 | End: 2017-08-24 | Stop reason: HOSPADM

## 2017-08-23 RX ORDER — ONDANSETRON 2 MG/ML
4 INJECTION INTRAMUSCULAR; INTRAVENOUS EVERY 6 HOURS PRN
Status: DISCONTINUED | OUTPATIENT
Start: 2017-08-23 | End: 2017-08-24 | Stop reason: HOSPADM

## 2017-08-23 RX ORDER — DEXAMETHASONE SODIUM PHOSPHATE 10 MG/ML
INJECTION INTRAMUSCULAR; INTRAVENOUS AS NEEDED
Status: DISCONTINUED | OUTPATIENT
Start: 2017-08-23 | End: 2017-08-23 | Stop reason: SURG

## 2017-08-23 RX ORDER — FENTANYL CITRATE 50 UG/ML
50 INJECTION, SOLUTION INTRAMUSCULAR; INTRAVENOUS
Status: DISCONTINUED | OUTPATIENT
Start: 2017-08-23 | End: 2017-08-23 | Stop reason: HOSPADM

## 2017-08-23 RX ORDER — POLYETHYLENE GLYCOL 3350 17 G/17G
17 POWDER, FOR SOLUTION ORAL DAILY PRN
Status: DISCONTINUED | OUTPATIENT
Start: 2017-08-23 | End: 2017-08-24 | Stop reason: HOSPADM

## 2017-08-23 RX ORDER — ONDANSETRON 4 MG/1
4 TABLET, FILM COATED ORAL EVERY 6 HOURS PRN
Status: DISCONTINUED | OUTPATIENT
Start: 2017-08-23 | End: 2017-08-24 | Stop reason: HOSPADM

## 2017-08-23 RX ORDER — CEFAZOLIN SODIUM 2 G/100ML
INJECTION, SOLUTION INTRAVENOUS AS NEEDED
Status: DISCONTINUED | OUTPATIENT
Start: 2017-08-23 | End: 2017-08-23 | Stop reason: SURG

## 2017-08-23 RX ORDER — SUCCINYLCHOLINE CHLORIDE 20 MG/ML
INJECTION INTRAMUSCULAR; INTRAVENOUS AS NEEDED
Status: DISCONTINUED | OUTPATIENT
Start: 2017-08-23 | End: 2017-08-23 | Stop reason: SURG

## 2017-08-23 RX ORDER — SODIUM CHLORIDE, SODIUM LACTATE, POTASSIUM CHLORIDE, CALCIUM CHLORIDE 600; 310; 30; 20 MG/100ML; MG/100ML; MG/100ML; MG/100ML
9 INJECTION, SOLUTION INTRAVENOUS CONTINUOUS
Status: DISCONTINUED | OUTPATIENT
Start: 2017-08-23 | End: 2017-08-23

## 2017-08-23 RX ORDER — METOCLOPRAMIDE HYDROCHLORIDE 5 MG/ML
10 INJECTION INTRAMUSCULAR; INTRAVENOUS EVERY 6 HOURS
Status: DISCONTINUED | OUTPATIENT
Start: 2017-08-23 | End: 2017-08-24 | Stop reason: HOSPADM

## 2017-08-23 RX ORDER — CEFAZOLIN SODIUM 2 G/100ML
2 INJECTION, SOLUTION INTRAVENOUS ONCE
Status: DISCONTINUED | OUTPATIENT
Start: 2017-08-23 | End: 2017-08-23 | Stop reason: HOSPADM

## 2017-08-23 RX ORDER — GLYCOPYRROLATE 0.2 MG/ML
INJECTION INTRAMUSCULAR; INTRAVENOUS AS NEEDED
Status: DISCONTINUED | OUTPATIENT
Start: 2017-08-23 | End: 2017-08-23 | Stop reason: SURG

## 2017-08-23 RX ORDER — PROPOFOL 10 MG/ML
VIAL (ML) INTRAVENOUS AS NEEDED
Status: DISCONTINUED | OUTPATIENT
Start: 2017-08-23 | End: 2017-08-23 | Stop reason: SURG

## 2017-08-23 RX ORDER — FAMOTIDINE 10 MG/ML
20 INJECTION, SOLUTION INTRAVENOUS ONCE
Status: COMPLETED | OUTPATIENT
Start: 2017-08-23 | End: 2017-08-23

## 2017-08-23 RX ORDER — LABETALOL HYDROCHLORIDE 5 MG/ML
5 INJECTION, SOLUTION INTRAVENOUS
Status: DISCONTINUED | OUTPATIENT
Start: 2017-08-23 | End: 2017-08-23 | Stop reason: HOSPADM

## 2017-08-23 RX ORDER — HYDROMORPHONE HYDROCHLORIDE 1 MG/ML
0.5 INJECTION, SOLUTION INTRAMUSCULAR; INTRAVENOUS; SUBCUTANEOUS
Status: DISCONTINUED | OUTPATIENT
Start: 2017-08-23 | End: 2017-08-23 | Stop reason: HOSPADM

## 2017-08-23 RX ORDER — ROSUVASTATIN CALCIUM 5 MG/1
5 TABLET, COATED ORAL DAILY
Status: DISCONTINUED | OUTPATIENT
Start: 2017-08-23 | End: 2017-08-24 | Stop reason: HOSPADM

## 2017-08-23 RX ORDER — DIAZEPAM 5 MG/1
10 TABLET ORAL ONCE
Status: COMPLETED | OUTPATIENT
Start: 2017-08-23 | End: 2017-08-23

## 2017-08-23 RX ORDER — ONDANSETRON 2 MG/ML
INJECTION INTRAMUSCULAR; INTRAVENOUS AS NEEDED
Status: DISCONTINUED | OUTPATIENT
Start: 2017-08-23 | End: 2017-08-23 | Stop reason: SURG

## 2017-08-23 RX ORDER — CEFAZOLIN SODIUM 2 G/100ML
2 INJECTION, SOLUTION INTRAVENOUS EVERY 8 HOURS
Status: COMPLETED | OUTPATIENT
Start: 2017-08-23 | End: 2017-08-24

## 2017-08-23 RX ORDER — HYDROCODONE BITARTRATE AND ACETAMINOPHEN 7.5; 325 MG/1; MG/1
1 TABLET ORAL ONCE AS NEEDED
Status: DISCONTINUED | OUTPATIENT
Start: 2017-08-23 | End: 2017-08-23 | Stop reason: HOSPADM

## 2017-08-23 RX ORDER — DIAZEPAM 5 MG/1
5 TABLET ORAL ONCE AS NEEDED
Status: DISCONTINUED | OUTPATIENT
Start: 2017-08-23 | End: 2017-08-23 | Stop reason: HOSPADM

## 2017-08-23 RX ORDER — EPHEDRINE SULFATE 50 MG/ML
5 INJECTION, SOLUTION INTRAVENOUS ONCE AS NEEDED
Status: DISCONTINUED | OUTPATIENT
Start: 2017-08-23 | End: 2017-08-23 | Stop reason: HOSPADM

## 2017-08-23 RX ORDER — MIDAZOLAM HYDROCHLORIDE 1 MG/ML
1 INJECTION INTRAMUSCULAR; INTRAVENOUS
Status: DISCONTINUED | OUTPATIENT
Start: 2017-08-23 | End: 2017-08-23 | Stop reason: HOSPADM

## 2017-08-23 RX ORDER — PROMETHAZINE HYDROCHLORIDE 25 MG/1
25 SUPPOSITORY RECTAL ONCE AS NEEDED
Status: DISCONTINUED | OUTPATIENT
Start: 2017-08-23 | End: 2017-08-23 | Stop reason: HOSPADM

## 2017-08-23 RX ORDER — NALOXONE HCL 0.4 MG/ML
0.2 VIAL (ML) INJECTION AS NEEDED
Status: DISCONTINUED | OUTPATIENT
Start: 2017-08-23 | End: 2017-08-23 | Stop reason: HOSPADM

## 2017-08-23 RX ORDER — LIDOCAINE HYDROCHLORIDE AND EPINEPHRINE 5; 5 MG/ML; UG/ML
INJECTION, SOLUTION INFILTRATION; PERINEURAL AS NEEDED
Status: DISCONTINUED | OUTPATIENT
Start: 2017-08-23 | End: 2017-08-23 | Stop reason: HOSPADM

## 2017-08-23 RX ORDER — PROMETHAZINE HYDROCHLORIDE 25 MG/1
12.5 TABLET ORAL ONCE AS NEEDED
Status: DISCONTINUED | OUTPATIENT
Start: 2017-08-23 | End: 2017-08-23 | Stop reason: HOSPADM

## 2017-08-23 RX ORDER — CALCIUM POLYCARBOPHIL 625 MG 625 MG/1
1250 TABLET ORAL DAILY
Status: DISCONTINUED | OUTPATIENT
Start: 2017-08-23 | End: 2017-08-24 | Stop reason: HOSPADM

## 2017-08-23 RX ORDER — PROMETHAZINE HYDROCHLORIDE 25 MG/ML
12.5 INJECTION, SOLUTION INTRAMUSCULAR; INTRAVENOUS ONCE AS NEEDED
Status: DISCONTINUED | OUTPATIENT
Start: 2017-08-23 | End: 2017-08-23 | Stop reason: HOSPADM

## 2017-08-23 RX ORDER — HYDROMORPHONE HCL 110MG/55ML
PATIENT CONTROLLED ANALGESIA SYRINGE INTRAVENOUS AS NEEDED
Status: DISCONTINUED | OUTPATIENT
Start: 2017-08-23 | End: 2017-08-23 | Stop reason: SURG

## 2017-08-23 RX ORDER — SCOLOPAMINE TRANSDERMAL SYSTEM 1 MG/1
1 PATCH, EXTENDED RELEASE TRANSDERMAL
Status: DISCONTINUED | OUTPATIENT
Start: 2017-08-23 | End: 2017-08-23 | Stop reason: HOSPADM

## 2017-08-23 RX ORDER — FLUMAZENIL 0.1 MG/ML
0.2 INJECTION INTRAVENOUS AS NEEDED
Status: DISCONTINUED | OUTPATIENT
Start: 2017-08-23 | End: 2017-08-23 | Stop reason: HOSPADM

## 2017-08-23 RX ORDER — SODIUM CHLORIDE 9 MG/ML
INJECTION, SOLUTION INTRAVENOUS AS NEEDED
Status: DISCONTINUED | OUTPATIENT
Start: 2017-08-23 | End: 2017-08-23 | Stop reason: HOSPADM

## 2017-08-23 RX ORDER — ROCURONIUM BROMIDE 10 MG/ML
INJECTION, SOLUTION INTRAVENOUS AS NEEDED
Status: DISCONTINUED | OUTPATIENT
Start: 2017-08-23 | End: 2017-08-23 | Stop reason: SURG

## 2017-08-23 RX ADMIN — CALCIUM POLYCARBOPHIL 1250 MG: 625 TABLET, FILM COATED ORAL at 21:33

## 2017-08-23 RX ADMIN — TECHNETIUM TC 99M SULFUR COLLOID 1 DOSE: KIT at 11:10

## 2017-08-23 RX ADMIN — HYDROMORPHONE HYDROCHLORIDE 0.5 MG: 2 INJECTION, SOLUTION INTRAMUSCULAR; INTRAVENOUS; SUBCUTANEOUS at 12:43

## 2017-08-23 RX ADMIN — CEFAZOLIN SODIUM 2 G: 2 INJECTION, SOLUTION INTRAVENOUS at 21:31

## 2017-08-23 RX ADMIN — SUCCINYLCHOLINE CHLORIDE 140 MG: 20 INJECTION, SOLUTION INTRAMUSCULAR; INTRAVENOUS; PARENTERAL at 11:52

## 2017-08-23 RX ADMIN — LIDOCAINE HYDROCHLORIDE 100 MG: 20 INJECTION, SOLUTION INFILTRATION; PERINEURAL at 11:52

## 2017-08-23 RX ADMIN — ONDANSETRON 4 MG: 2 INJECTION INTRAMUSCULAR; INTRAVENOUS at 14:49

## 2017-08-23 RX ADMIN — DIAZEPAM 10 MG: 5 TABLET ORAL at 10:28

## 2017-08-23 RX ADMIN — SODIUM CHLORIDE, POTASSIUM CHLORIDE, SODIUM LACTATE AND CALCIUM CHLORIDE 100 ML/HR: 600; 310; 30; 20 INJECTION, SOLUTION INTRAVENOUS at 11:34

## 2017-08-23 RX ADMIN — SCOPALAMINE 1 PATCH: 1 PATCH, EXTENDED RELEASE TRANSDERMAL at 11:33

## 2017-08-23 RX ADMIN — ROCURONIUM BROMIDE 20 MG: 10 INJECTION INTRAVENOUS at 14:01

## 2017-08-23 RX ADMIN — FENTANYL CITRATE 100 MCG: 50 INJECTION INTRAMUSCULAR; INTRAVENOUS at 11:52

## 2017-08-23 RX ADMIN — PROPOFOL 200 MG: 10 INJECTION, EMULSION INTRAVENOUS at 11:52

## 2017-08-23 RX ADMIN — ROSUVASTATIN CALCIUM 5 MG: 5 TABLET, FILM COATED ORAL at 21:33

## 2017-08-23 RX ADMIN — MIDAZOLAM 1 MG: 1 INJECTION INTRAMUSCULAR; INTRAVENOUS at 11:33

## 2017-08-23 RX ADMIN — HYDROCODONE BITARTRATE AND ACETAMINOPHEN 2 TABLET: 5; 325 TABLET ORAL at 18:59

## 2017-08-23 RX ADMIN — FAMOTIDINE 20 MG: 10 INJECTION INTRAVENOUS at 11:33

## 2017-08-23 RX ADMIN — HYDROMORPHONE HYDROCHLORIDE 0.5 MG: 2 INJECTION, SOLUTION INTRAMUSCULAR; INTRAVENOUS; SUBCUTANEOUS at 14:59

## 2017-08-23 RX ADMIN — DEXAMETHASONE SODIUM PHOSPHATE 8 MG: 10 INJECTION INTRAMUSCULAR; INTRAVENOUS at 12:17

## 2017-08-23 RX ADMIN — SODIUM CHLORIDE, POTASSIUM CHLORIDE, SODIUM LACTATE AND CALCIUM CHLORIDE: 600; 310; 30; 20 INJECTION, SOLUTION INTRAVENOUS at 12:33

## 2017-08-23 RX ADMIN — GLYCOPYRROLATE 0.4 MG: 0.2 INJECTION INTRAMUSCULAR; INTRAVENOUS at 15:16

## 2017-08-23 RX ADMIN — CEFAZOLIN SODIUM 2 G: 2 INJECTION, SOLUTION INTRAVENOUS at 11:44

## 2017-08-23 RX ADMIN — NEOSTIGMINE METHYLSULFATE 2.5 MG: 1 INJECTION INTRAMUSCULAR; INTRAVENOUS; SUBCUTANEOUS at 15:16

## 2017-08-23 NOTE — ANESTHESIA PROCEDURE NOTES
Airway  Urgency: elective    Airway not difficult    General Information and Staff    Patient location during procedure: OR  Anesthesiologist: JERRICA MESA  CRNA: MILAGROS HERNANDEZ    Indications and Patient Condition  Indications for airway management: airway protection    Preoxygenated: yes  MILS maintained throughout  Mask difficulty assessment: 1 - vent by mask    Final Airway Details  Final airway type: endotracheal airway      Successful airway: ETT  Cuffed: yes   Successful intubation technique: direct laryngoscopy  Facilitating devices/methods: intubating stylet  Endotracheal tube insertion site: oral  Blade: Andrew  Blade size: #2  ETT size: 7.0 mm  Cormack-Lehane Classification: grade I - full view of glottis  Placement verified by: chest auscultation and capnometry   Measured from: lips  ETT to lips (cm): 21  Number of attempts at approach: 1    Additional Comments  Smooth iv induction with no complications

## 2017-08-23 NOTE — ANESTHESIA PREPROCEDURE EVALUATION
Anesthesia Evaluation     Patient summary reviewed and Nursing notes reviewed          Airway   Mallampati: II  no difficulty expected  Dental      Pulmonary    (+) a smoker Former,   Cardiovascular - negative cardio ROS    ECG reviewed  Rhythm: regular  Rate: normal        Neuro/Psych- negative ROS  GI/Hepatic/Renal/Endo - negative ROS     Musculoskeletal (-) negative ROS    Abdominal    Substance History - negative use     OB/GYN negative ob/gyn ROS         Other                                        Anesthesia Plan    ASA 3     general     intravenous induction   Anesthetic plan and risks discussed with patient.

## 2017-08-23 NOTE — ANESTHESIA POSTPROCEDURE EVALUATION
Patient: Jessica Suazo    Procedure Summary     Date Anesthesia Start Anesthesia Stop Room / Location    08/23/17 1144 1531  [facility] /  [facility]       Procedure Diagnosis Surgeon Provider    right total mastectomy, right axillary sentinel lymph node biopsy x2 with immediate reconstruction of right breast with expander. (Right Breast); RT TISSUE EXPANDER W/ ALLODERM (Right Breast) Breast cancer of upper-outer quadrant of right female breast  (Breast cancer of upper-outer quadrant of right female breast [C50.411]) Dyan Kinsey MD; Maurine Waterhouse, MD Christopher F Payton, MD          Anesthesia Type: general  Last vitals  BP        Temp        Pulse       Resp        SpO2          Post Anesthesia Care and Evaluation    Patient location during evaluation: PACU  Patient participation: complete - patient participated  Level of consciousness: awake and alert  Pain management: adequate  Airway patency: patent  Anesthetic complications: No anesthetic complications    Cardiovascular status: acceptable  Respiratory status: acceptable  Hydration status: acceptable    Comments: --------------------            08/23/17               1136     --------------------   BP:                  Pulse:      65       Resp:                Temp:                SpO2:      97%      --------------------

## 2017-08-24 ENCOUNTER — TELEPHONE (OUTPATIENT)
Dept: SURGERY | Facility: CLINIC | Age: 52
End: 2017-08-24

## 2017-08-24 VITALS
SYSTOLIC BLOOD PRESSURE: 107 MMHG | RESPIRATION RATE: 16 BRPM | BODY MASS INDEX: 37.2 KG/M2 | DIASTOLIC BLOOD PRESSURE: 62 MMHG | HEART RATE: 67 BPM | TEMPERATURE: 98.1 F | HEIGHT: 61 IN | WEIGHT: 197.06 LBS | OXYGEN SATURATION: 97 %

## 2017-08-24 LAB
CYTO UR: NORMAL
LAB AP CASE REPORT: NORMAL
LAB AP SYNOPTIC CHECKLIST: NORMAL
Lab: NORMAL
Lab: NORMAL
PATH REPORT.FINAL DX SPEC: NORMAL
PATH REPORT.GROSS SPEC: NORMAL

## 2017-08-24 PROCEDURE — 25010000003 CEFAZOLIN IN DEXTROSE 2-4 GM/100ML-% SOLUTION: Performed by: SURGERY

## 2017-08-24 PROCEDURE — 94799 UNLISTED PULMONARY SVC/PX: CPT

## 2017-08-24 PROCEDURE — 99024 POSTOP FOLLOW-UP VISIT: CPT | Performed by: SURGERY

## 2017-08-24 RX ADMIN — CEFAZOLIN SODIUM 2 G: 2 INJECTION, SOLUTION INTRAVENOUS at 04:08

## 2017-08-24 RX ADMIN — POTASSIUM CHLORIDE, DEXTROSE MONOHYDRATE AND SODIUM CHLORIDE 75 ML/HR: 150; 5; 450 INJECTION, SOLUTION INTRAVENOUS at 03:07

## 2017-08-24 RX ADMIN — HYDROCODONE BITARTRATE AND ACETAMINOPHEN 2 TABLET: 5; 325 TABLET ORAL at 08:39

## 2017-08-24 RX ADMIN — HYDROCODONE BITARTRATE AND ACETAMINOPHEN 2 TABLET: 5; 325 TABLET ORAL at 12:22

## 2017-08-24 NOTE — TELEPHONE ENCOUNTER
Pathology from LEFT total mastectomy and LEFT SLNB with immediate recosntruction Dr Waterhouse returned as   In situ and invasive lobular carcinoma, intermediate grade, 3, 2, 1, no duct carcinoma in situ present but lobular carcinoma in situ was present.  Greatest dimension of invasive carcinoma 1.8 cm.  Margins are uninvolved with closest margin greater than 1 cm.  0 of 2 lymph nodes involved. Extensive non-atypical duct hyperplasia and LCIS in the mastectomy specimen.   Pathologic stage TI CN 0.   I will call to let her know.        Final Diagnosis     1.  SENTINEL LYMPH NODE #1, RIGHT AXILLA, EXCISIONAL SPECIMEN (ONE NODE):                          NO TUMOR IDENTIFIED.     2.  SENTINEL LYMPH NODE #2,  RIGHT AXILLA, EXCISIONAL SPECIMEN (ONE NODE):                         NO TUMOR IDENTIFIED                         MULTIPLE NON-NECROTIZING GRANULOMAS.               NEGATIVE STAINS FOR FUNGAL AND ACID FAST ORGANISMS (with appropriate controls).     3.  RIGHT BREAST, TOTAL MASTECTOMY SPECIMEN:                         IN SITU AND INVASIVE LOBULAR CARCINOMA.                         NEGATIVE MARGINS.                         NO ATTACHED LYMPH NODES OR SKELETAL MUSCLE.                         NEGATIVE SKIN AND NIPPLE.               EXTENSIVE LCIS AND NON-ATYPICAL DUCT HYPERPLASIA.                         (SEE SYNOPTIC REPORT.)     DAYO/brb/th  IHC/a/TDJ      CPT CODES:  1.  28710, 90468.  2.  89301, 12716, 90746 (x2)  3.  39818, 3260F     Electronically signed by Jason Morales MD on 8/24/2017 at 1345     Synoptic Checklist       INVASIVE CARCINOMA OF THE BREAST: Complete Excision (Less Than Total Mastectomy, Including Specimens Designated Biopsy, Lumpectomy, Quadrantectomy, and Partial Mastectomy With or Without Axillary Contents) and Mastectomy (Total, Modified Radical, Radical With or Without Axillary Contents)Radical, Radical With or Without Axillary Contents)  Breast Invasive - All Specimens     SPECIMEN      Procedure  Total mastectomy (including nipple and skin)     Lymph Node Sampling  [place] lymph node(s)     Specimen Laterality  Right     TUMOR     Presence of Invasive Carcinoma       Histologic Type  Invasive lobular carcinoma     Histologic Grade ([place] Histologic Score)       Glandular (Acinar) / Tubular Differentiation  Score 3 (< 10% of tumor area forming glandular / tubular structures)     Nuclear Pleomorphism  Score 2 (Cells larger than normal with open vesicular nuclei, visible nucleoli, and moderate variability in both size and shape)     Mitotic Rate  Score 1 (<=3 mitoses per mm2)     Overall Grade  Grade 2 (scores of 6 or 7)     Ductal Carcinoma In Situ (DCIS)  No DCIS is present     Lobular Carcinoma In Situ (LCIS)  Present     Tumor Size / Focality       Tumor Size: Size of Largest Invasive Carcinoma  Greatest dimension of largest focus of invasion > 1 mm     Greatest Dimension (mm)  18 mm     Skin       MARGINS     Invasive Carcinoma  Margins uninvolved by invasive carcinoma     Distance from Closest Margin  Distance is > 10 mm     Closest Uninvolved Margin  Anterior       Posterior     Ductal Carcinoma In Situ (DCIS)  DCIS not present in specimen     LYMPH NODES     Regional Lymph Nodes       [place] Node Status  [place] lymph node biopsy performed     Number of [place] Nodes Examined  Specify number: 2     Method of Evaluation of [place] Lymph Node(s)  H&E, multiple levels     Number of Lymph Node(s) Examined (sentinel and nonsentinel)  Specify number: 2     STAGE (pTNM)     Primary Tumor (Invasive Carcinoma) (pT)  pT1c: Tumor > 10 mm but <= 20 mm in greatest dimension     Modifier  (sn): Only sentinel node(s) evaluated. If 6 or more nodes (sentinel or nonsentinel) are removed, this modifier should not be used.     Category (pN)  pN0: No regional lymph node metastasis identified histologically     ADDITIONAL FINDINGS     Additional Pathologic Findings (specify)  Non-necrotizing  granulomas in sentinel node #2 (negative for acid fast and fungal organisms).  Extensive non-atypical duct hyperplasia and LCIS in specimen #3.     Comment(s)     Comment(s)  See [id number] for hormone receptors.

## 2017-08-29 ENCOUNTER — TELEPHONE (OUTPATIENT)
Dept: SURGERY | Facility: CLINIC | Age: 52
End: 2017-08-29

## 2017-08-29 NOTE — TELEPHONE ENCOUNTER
Pt called her pain is controlled, she is just sore.  I explained she would have to call Dr.Waterhouse office  For refills on pain medication    Pt is seeing Dr Waterhouse  on 8-31-17    kdl

## 2017-08-29 NOTE — TELEPHONE ENCOUNTER
Patient called office spoke with Mariposa requesting more pain medication.     I called patient to follow up but had to leave a voicemail for her to call me back   Need to know if pain is controled and if she's contacted plastic surgeon; Dr. Waterhouse? Also when does she see Dr. Waterhouse in office post-op?    lml

## 2017-08-29 NOTE — TELEPHONE ENCOUNTER
from genetic counseling on Jessica Suazo dated July 19, 2017 at the time of appointment she expressed an interest in pursuing testing and so a Gene DX multi-gene panel was sent.  Results pending.

## 2017-09-01 ENCOUNTER — OFFICE VISIT (OUTPATIENT)
Dept: ONCOLOGY | Facility: CLINIC | Age: 52
End: 2017-09-01

## 2017-09-01 ENCOUNTER — LAB (OUTPATIENT)
Dept: LAB | Facility: HOSPITAL | Age: 52
End: 2017-09-01

## 2017-09-01 VITALS
SYSTOLIC BLOOD PRESSURE: 108 MMHG | DIASTOLIC BLOOD PRESSURE: 68 MMHG | HEART RATE: 73 BPM | OXYGEN SATURATION: 97 % | RESPIRATION RATE: 14 BRPM | TEMPERATURE: 97.4 F | HEIGHT: 61 IN | WEIGHT: 194.6 LBS | BODY MASS INDEX: 36.74 KG/M2

## 2017-09-01 DIAGNOSIS — Z82.49 FAMILY HISTORY OF DVT: ICD-10-CM

## 2017-09-01 DIAGNOSIS — C50.411 BREAST CANCER OF UPPER-OUTER QUADRANT OF RIGHT FEMALE BREAST (HCC): Primary | ICD-10-CM

## 2017-09-01 LAB
ALBUMIN SERPL-MCNC: 3.8 G/DL (ref 3.5–5.2)
ALBUMIN/GLOB SERPL: 1.3 G/DL (ref 1.1–2.4)
ALP SERPL-CCNC: 73 U/L (ref 38–116)
ALT SERPL W P-5'-P-CCNC: 13 U/L (ref 0–33)
ANION GAP SERPL CALCULATED.3IONS-SCNC: 13 MMOL/L
AST SERPL-CCNC: 14 U/L (ref 0–32)
BASOPHILS # BLD AUTO: 0.07 10*3/MM3 (ref 0–0.1)
BASOPHILS NFR BLD AUTO: 1 % (ref 0–1.1)
BILIRUB SERPL-MCNC: 0.6 MG/DL (ref 0.1–1.2)
BUN BLD-MCNC: 15 MG/DL (ref 6–20)
BUN/CREAT SERPL: 22.4 (ref 7.3–30)
CALCIUM SPEC-SCNC: 8.7 MG/DL (ref 8.5–10.2)
CHLORIDE SERPL-SCNC: 100 MMOL/L (ref 98–107)
CO2 SERPL-SCNC: 26 MMOL/L (ref 22–29)
CREAT BLD-MCNC: 0.67 MG/DL (ref 0.6–1.1)
DEPRECATED RDW RBC AUTO: 43.4 FL (ref 37–49)
EOSINOPHIL # BLD AUTO: 0.38 10*3/MM3 (ref 0–0.36)
EOSINOPHIL NFR BLD AUTO: 5.2 % (ref 1–5)
ERYTHROCYTE [DISTWIDTH] IN BLOOD BY AUTOMATED COUNT: 13 % (ref 11.7–14.5)
GFR SERPL CREATININE-BSD FRML MDRD: 94 ML/MIN/1.73
GLOBULIN UR ELPH-MCNC: 2.9 GM/DL (ref 1.8–3.5)
GLUCOSE BLD-MCNC: 115 MG/DL (ref 74–124)
HCT VFR BLD AUTO: 42.6 % (ref 34–45)
HGB BLD-MCNC: 14.1 G/DL (ref 11.5–14.9)
HOLD SPECIMEN: NORMAL
IMM GRANULOCYTES # BLD: 0.08 10*3/MM3 (ref 0–0.03)
IMM GRANULOCYTES NFR BLD: 1.1 % (ref 0–0.5)
LYMPHOCYTES # BLD AUTO: 2.18 10*3/MM3 (ref 1–3.5)
LYMPHOCYTES NFR BLD AUTO: 30.1 % (ref 20–49)
MCH RBC QN AUTO: 30 PG (ref 27–33)
MCHC RBC AUTO-ENTMCNC: 33.1 G/DL (ref 32–35)
MCV RBC AUTO: 90.6 FL (ref 83–97)
MONOCYTES # BLD AUTO: 0.71 10*3/MM3 (ref 0.25–0.8)
MONOCYTES NFR BLD AUTO: 9.8 % (ref 4–12)
NEUTROPHILS # BLD AUTO: 3.82 10*3/MM3 (ref 1.5–7)
NEUTROPHILS NFR BLD AUTO: 52.8 % (ref 39–75)
NRBC BLD MANUAL-RTO: 0 /100 WBC (ref 0–0)
PLATELET # BLD AUTO: 294 10*3/MM3 (ref 150–375)
PMV BLD AUTO: 9.5 FL (ref 8.9–12.1)
POTASSIUM BLD-SCNC: 4 MMOL/L (ref 3.5–4.7)
PROT SERPL-MCNC: 6.7 G/DL (ref 6.3–8)
RBC # BLD AUTO: 4.7 10*6/MM3 (ref 3.9–5)
SODIUM BLD-SCNC: 139 MMOL/L (ref 134–145)
WBC NRBC COR # BLD: 7.24 10*3/MM3 (ref 4–10)

## 2017-09-01 PROCEDURE — 85025 COMPLETE CBC W/AUTO DIFF WBC: CPT | Performed by: INTERNAL MEDICINE

## 2017-09-01 PROCEDURE — 36415 COLL VENOUS BLD VENIPUNCTURE: CPT | Performed by: INTERNAL MEDICINE

## 2017-09-01 PROCEDURE — 99215 OFFICE O/P EST HI 40 MIN: CPT | Performed by: INTERNAL MEDICINE

## 2017-09-01 PROCEDURE — 80053 COMPREHEN METABOLIC PANEL: CPT

## 2017-09-01 RX ORDER — CEPHALEXIN 500 MG/1
CAPSULE ORAL
Refills: 0 | COMMUNITY
Start: 2017-08-24 | End: 2017-10-09

## 2017-09-01 RX ORDER — HYDROCODONE BITARTRATE AND ACETAMINOPHEN 5; 325 MG/1; MG/1
TABLET ORAL
Refills: 0 | COMMUNITY
Start: 2017-08-30 | End: 2017-09-26 | Stop reason: HOSPADM

## 2017-09-01 RX ORDER — NAPROXEN 500 MG/1
500 TABLET ORAL ONCE AS NEEDED
COMMUNITY
Start: 2017-08-21 | End: 2017-09-26 | Stop reason: HOSPADM

## 2017-09-01 RX ORDER — ONDANSETRON 4 MG/1
TABLET, FILM COATED ORAL
Refills: 0 | COMMUNITY
Start: 2017-08-24 | End: 2017-10-13

## 2017-09-01 NOTE — PROGRESS NOTES
Subjective .     REASONS FOR FOLLOWUP:    1. Stage IA ([id number]) right breast cancer: 1.8 cm grade 2 invasive lobular carcinoma, ER positive (95%, IN positive (95%), HER-2/tanya negative (IHC 0), sentinel lymph node negative ×2.  Status post right mastectomy 17.  2. Genetics evaluation 17 with negative Gene DX panel test.  3. Strong family history of thrombosis with no detectable familial hypercoagulable factor.  No personal history of thrombosis.  Hypercoagulable evaluation negative 9/24/15, additional negative evaluation 8/3/17.  Continues chronically on aspirin 81 mg daily.    HISTORY OF PRESENT ILLNESS:  The patient is a 51 y.o. year old female who is here for follow-up with the above-mentioned history.    History of Present Illness   patient returns today for follow-up after having undergone a right mastectomy with sentinel lymph node biopsy on 17.  She is doing reasonably well following her surgery with a mild expected degree of postoperative discomfort.  She has one SANJUANA drain remaining in place.  She did undergo placement of a tissue expander with plastic surgery at the same time.  She reports some continued fatigue postoperatively.  She has no other complaints today.    Past Medical History:   Diagnosis Date   • Anxiety    • Asthma    • Cancer 2017    Right   • GERD (gastroesophageal reflux disease) 2017   • Herpes     + HSv no outbreaks   • HTN (hypertension) 2017   • Hypertension     TAKES NO MEDS   • Lichen sclerosus    • Lichen sclerosus et atrophicus 2017   • Migraine      Past Surgical History:   Procedure Laterality Date   • BREAST BIOPSY Right 2017   • BREAST RECONSTRUCTION Right 2017    Procedure: RT TISSUE EXPANDER W/ ALLODERM;  Surgeon: Maurine Waterhouse, MD;  Location: [facility];  Service:    •  SECTION     • HAND SURGERY Right    • MASTECTOMY Right 2017    Procedure: right total mastectomy, right axillary sentinel lymph node biopsy x2 with  immediate reconstruction of right breast with expander.;  Surgeon: Dyan Kinsey MD;  Location: [facility] OR;  Service:        ONCOLOGIC HISTORY:  (History from previous dates can be found in the separate document.)    Current Outpatient Prescriptions on File Prior to Visit   Medication Sig Dispense Refill   • clobetasol (TEMOVATE) 0.05 % ointment Apply  topically 2 (Two) Times a Day. Apply BID x 14 days, then QD x 14 days, then 3 times a week as needed for itching (Patient taking differently: Apply 1 application topically 2 (Two) Times a Day. Apply BID x 14 days, then QD x 14 days, then 3 times a week as needed for itching) 60 g 2   • fluticasone-salmeterol (ADVAIR) 100-50 MCG/DOSE DISKUS Inhale 1 puff As Needed (SOB OR WHEEZING).     • gabapentin (NEURONTIN) 400 MG capsule Take 400 mg by mouth As Needed (ARM PAIN).  0   • rosuvastatin (CRESTOR) 5 MG tablet Take 5 mg by mouth Daily.     • Sumatriptan-Naproxen Sodium (TREXIMET PO) Take 1 tablet by mouth As Needed (MIGRAINES).       No current facility-administered medications on file prior to visit.        ALLERGIES:     Allergies   Allergen Reactions   • Sesame Seed [Sesame Oil]      Sunflower seed and the food family of it       Social History     Social History   • Marital status:      Spouse name: N/A   • Number of children: 1   • Years of education: College     Occupational History   •  [employer]     Social History Main Topics   • Smoking status: Former Smoker     Packs/day: 0.50     Years: 10.00     Types: Cigarettes     Quit date: 2007   • Smokeless tobacco: Never Used   • Alcohol use Yes      Comment: Occasional   • Drug use: No   • Sexual activity: Not Currently     Partners: Male     Birth control/ protection: Condom     Other Topics Concern   • Not on file     Social History Narrative     Family History   Problem Relation Age of Onset   • Breast cancer Mother 70   • Hearing loss Mother    • Hyperlipidemia Mother    •  Diabetes Father    • Asthma Sister    • Clotting disorder Sister    • COPD Sister    • Pulmonary embolism Sister    • Deep vein thrombosis Brother    • Pulmonary embolism Brother    • Malig Hyperthermia Neg Hx         Review of Systems   Constitutional: Positive for fatigue. Negative for activity change, appetite change, fever and unexpected weight change.   HENT: Negative for congestion, mouth sores, nosebleeds, sore throat and voice change.    Respiratory: Negative for cough, shortness of breath and wheezing.    Cardiovascular: Negative for chest pain, palpitations and leg swelling.        Postoperative pain in the right chest wall   Gastrointestinal: Negative for abdominal distention, abdominal pain, blood in stool, constipation, diarrhea, nausea and vomiting.   Endocrine: Negative for cold intolerance and heat intolerance.   Genitourinary: Negative for difficulty urinating, dysuria, frequency and hematuria.   Musculoskeletal: Negative for arthralgias, back pain, joint swelling and myalgias.   Skin: Negative for rash.   Neurological: Negative for dizziness, syncope, weakness, light-headedness, numbness and headaches.   Hematological: Negative for adenopathy. Does not bruise/bleed easily.   Psychiatric/Behavioral: Negative for confusion and sleep disturbance. The patient is not nervous/anxious.          Objective      Vitals:    09/01/17 0946   BP: 108/68   Pulse: 73   Resp: 14   Temp: 97.4 °F (36.3 °C)   SpO2: 97%      Current Status 9/1/2017   ECOG score 0   Pain 4/10    Physical Exam   Constitutional: She is oriented to person, place, and time. She appears well-developed and well-nourished.   HENT:   Mouth/Throat: Oropharynx is clear and moist.   Eyes: Conjunctivae are normal.   Neck: No thyromegaly present.   Cardiovascular: Normal rate and regular rhythm.  Exam reveals no gallop and no friction rub.    No murmur heard.  Pulmonary/Chest: Breath sounds normal. No respiratory distress.   Status post right  mastectomy with tissue expander placement and right sentinel lymph node biopsy.  Surgical incisions appear to be healing well.  One SANJUANA drain remaining in place with serosanguineous drainage.  Left breast not examined today.   Abdominal: Soft. Bowel sounds are normal. She exhibits no distension. There is no tenderness.   Musculoskeletal: She exhibits no edema.   Lymphadenopathy:        Head (right side): No submandibular adenopathy present.     She has no cervical adenopathy.     She has no axillary adenopathy.        Right: No inguinal and no supraclavicular adenopathy present.        Left: No inguinal and no supraclavicular adenopathy present.   Neurological: She is alert and oriented to person, place, and time. She displays normal reflexes. No cranial nerve deficit. She exhibits normal muscle tone.   Skin: Skin is warm and dry. No rash noted.   Psychiatric: She has a normal mood and affect. Her behavior is normal.       RECENT LABS:  Hematology WBC   Date Value Ref Range Status   09/01/2017 7.24 4.00 - 10.00 10*3/mm3 Final     RBC   Date Value Ref Range Status   09/01/2017 4.70 3.90 - 5.00 10*6/mm3 Final     Hemoglobin   Date Value Ref Range Status   09/01/2017 14.1 11.5 - 14.9 g/dL Final     Hematocrit   Date Value Ref Range Status   09/01/2017 42.6 34.0 - 45.0 % Final     Platelets   Date Value Ref Range Status   09/01/2017 294 150 - 375 10*3/mm3 Final        Lab Results   Component Value Date    GLUCOSE 115 09/01/2017    BUN 15 09/01/2017    CREATININE 0.67 09/01/2017    EGFRIFNONA 94 09/01/2017    BCR 22.4 09/01/2017    K 4.0 09/01/2017    CO2 26.0 09/01/2017    CALCIUM 8.7 09/01/2017    ALBUMIN 3.80 09/01/2017    LABIL2 1.3 09/01/2017    AST 14 09/01/2017    ALT 13 09/01/2017     Operative and pathology reports reviewed as well as genetics result as discussed below in the assessment.      Assessment/Plan      1. Pathologic stage IA ([id number]) right breast cancer: The patient had a mammographically detected  nonpalpable abnormality of the right breast measuring 1.3 cm by ultrasound in the 10 o'clock position with biopsy 7/3/17 showing an invasive lobular carcinoma largest size measuring 9 mm, grade 1, ER positive (95%), MT positive (95%), HER-2/tanya 0 (IHC).  There were foci of associated LCIS.  There was no lymphatic nor perineural invasion.  Oncotype DX testing was sent revealing a recurrent score in a low risk range at 12 (10 year recurrence risk of 8% with treatment with tamoxifen, 1% benefit from adjuvant chemotherapy) and confirmed ER positive, MT positive, HER-2/tanya negative.  MRI of the breasts 7/13/17 showed the biopsy-proven right breast malignancy measuring 1.9 cm with no axillary lymph node enlargement.  There was however in the middle third of the right breast a cluster of microcalcifications that appear to have increased over time.  An MRI guided biopsy of this area was performed 7/25/17 in the 9:30 position revealing an invasive lobular carcinoma, 4 mm, grade 2, ER positive (greater than 90%), MT positive (90%), HER-2/tanya 1+ recurrent sees IHC) with associated LCIS.  The patient was seen in the genetics clinic and underwent Gene DX panel testing 7/19/17 which was negative.  She has a family history of malignancy with breast cancer in her mother at age 70.  The patient saw Dr. Kinsey who discussed surgical management of her disease.  With evidence of multifocal involvement of the breast, it was recommended to undergo a right mastectomy with sentinel lymph node biopsy.  She did see Dr. Waterhouse as well in regards to reconstruction.    The patient was seen in initial consultation on 8/2/17.   We discussed the meaning of her Oncotype DX score which was in the low risk category at 12 indicating a 10 year recurrence risk of distant metastasis of 8% with tamoxifen alone.  We discussed that adjuvant chemotherapy would provide only approximately a 1% additional benefit.  It was not recommended for her to  receive adjuvant chemotherapy.      The patient returns today in follow-up having undergone her right mastectomy with implant reconstruction and sentinel lymph node biopsy on 8/23/17.  We did review her pathology today which showed a 1.8 cm grade 2 invasive lobular carcinoma with negative margins (greater than 1 cm), extensive associated LCIS and non-atypical ductal hyperplasia, sentinel lymph node negative ×2 with evidence of multiple noncaseating granulomas.  Therefore, the patient had Stage IA ([id number]) disease.  As we discussed at her last visit, I would not recommend adjuvant chemotherapy given her low risk Oncotype score.  This would provide only an additional 1% benefit.  The patient agrees.  We discussed pursuing adjuvant endocrine therapy.  As she is premenopausal, this would typically be undertaken with tamoxifen.  There is concern however given the patient's significant family history of thrombosis occurring at a young age with no identifiable/traceable risk factor.  Tamoxifen may confer a significant additional risk for her although she has not experienced a personal history of thrombosis.  Also, we discussed that there is suggestion from subgroup analysis of the BIG 1-98 study to suggest a significant additional benefit of aromatase inhibitor therapy over tamoxifen for invasive lobular cancer.  Therefore, we discussed the possibility of laparoscopic bilateral oopherectomy as she is nearing a perimenopausal age in order to facilitate administration of an aromatase inhibitor rather than tamoxifen.  This would confer significantly less thrombotic risk as well.  Before initiating any therapy, I will refer the patient back to Dr. Dee Morales her gynecologist to discuss the possibility of bilateral oopherectomy.  I will see her back shortly thereafter to discuss things further.  If we do not pursue oopherectomy, we would need to consider the use of aspirin 81 mg per day versus prophylactic dose novel oral  anticoagulant therapy along with tamoxifen  2. Family history of thrombosis: The patient has no personal history of thrombosis.  She has a significant family history of thrombosis however with a brother who developed a pulmonary embolism in his 30s, a brother developed a DVT at age 44, and a sister with a pulmonary embolism at age 55, all unprovoked.  The patient herself did undergo a hypercoagulable evaluation performed by her primary care physician [facility] 9/24/15 which showed negative prothrombin gene mutation analysis, negative factor V Leiden gene mutation analysis, negative lupus anticoagulant, negative anticardiolipin antibodies, normal homocystine, protein C activity 168%, protein S free 107%.   we did perform additional hypercoagulable labs from 8/3/17 showing protein S free antigen 109%, protein S total antigen 125%, protein S activity 83%, antithrombin III activity 96%, beta-2 GP 1 antibody panel negative.  Therefore, the patient has no detectable underlying hypercoagulable state.  None of her family members however had a detectable risk factor either.  This does produce some uncertainty in regards to her risk with tamoxifen.  She does continue on aspirin 81 mg a day which is certainly reasonable for now however if we elect to treat her with tamoxifen, we would have to consider whether to administer a prophylactic dose of a novel oral anticoagulant as well.  There is no significant data to support this however.  We will first explore the possibility of oopherectomy and aromatase inhibitor therapy instead of tamoxifen.  3. History of tobacco abuse: The patient reports that she has been a social smoker in the past.  She has now however stop smoking completely.  We discussed again today the need to remain off of smoking for multiple reasons including postoperative healing, cancer risk, and thrombotic risk.  She did express understanding.     Plan:    1. Postoperative follow-up with   Amelie and Dr. Waterhouse as scheduled  2. Refer back to gynecology Dr. Dee Morales to discuss possibility of laparoscopic bilateral oopherectomy to achieve surgical menopause.  3. M.D. visit in 2-3 weeks with CBC, CMP.  We will further discuss adjuvant endocrine therapy at that time as well as the associated thrombotic risk and its management.    I did spend 45 minutes with the patient today, 80% of that time in face-to-face consultation and coordination of care.  We discussed issues outlined above in the assessment and plan.

## 2017-09-05 ENCOUNTER — TELEPHONE (OUTPATIENT)
Dept: SURGERY | Facility: CLINIC | Age: 52
End: 2017-09-05

## 2017-09-05 ENCOUNTER — OFFICE VISIT (OUTPATIENT)
Dept: SURGERY | Facility: CLINIC | Age: 52
End: 2017-09-05

## 2017-09-05 VITALS
DIASTOLIC BLOOD PRESSURE: 88 MMHG | HEIGHT: 61 IN | WEIGHT: 197 LBS | TEMPERATURE: 98.5 F | SYSTOLIC BLOOD PRESSURE: 128 MMHG | OXYGEN SATURATION: 99 % | HEART RATE: 80 BPM | BODY MASS INDEX: 37.19 KG/M2

## 2017-09-05 DIAGNOSIS — Z82.49 FAMILY HISTORY OF DVT: ICD-10-CM

## 2017-09-05 DIAGNOSIS — Z80.3 FH: BREAST CANCER: ICD-10-CM

## 2017-09-05 DIAGNOSIS — E66.9 OBESITY (BMI 35.0-39.9 WITHOUT COMORBIDITY): ICD-10-CM

## 2017-09-05 DIAGNOSIS — C50.411 MALIGNANT NEOPLASM OF UPPER-OUTER QUADRANT OF RIGHT FEMALE BREAST (HCC): Primary | ICD-10-CM

## 2017-09-05 PROCEDURE — 99024 POSTOP FOLLOW-UP VISIT: CPT | Performed by: SURGERY

## 2017-09-05 NOTE — PROGRESS NOTES
Chief Complaint: Jessica Suazo is a 51 y.o. female who was seen in consultation at the request of Dee Morales*  for a postoperative visit    History of Present Illness:  Patient presents with newly diagnosed breast cancer, RIGHT breast. She noted no new masses, skin changes, nipple discharge, nipple changes prior to her most recent imaging.  Her most recent imaging includes the following: June 22, 2017 heterogenous a dense breast tissue.  2 cm mass in the axillary tail the right breast, BI-RADS 0.  Most previous prior was January 11, 2016 screening mammogram that showed bilateral calcifications right greater than left with no change.  BI-RADS 2.  She came back June 28, 2017 for a right breast ultrasound that showed at the 10:00 location a mass in the right breast measuring 1.3 cm, BI-RADS 5.  July 3, 2017 underwent an ultrasound-guided biopsy for cores a 14-gauge.  Marker left at the medial margin of the lesion.  Pathology returned as invasive lobular carcinoma, low grade, 3, 1, 1, foci of LCIS, no lymphovascular invasion, estrogen 95 progesterone 95 and HER-2/tanya 0.    I then had her get a bilateral breast MRI at [facility] July 14, 2017.  Right breast 10:00, 9 cm from the nipple a 1.9 x 1.7 cm mass with a clip at the posterior margin.  Bilateral short scattered segmental enhancement.  Reviewed outside mammogram and felt that the calcifications are greater on the right than the left.  Also some areas of short segment enhancement right breast.  Recommend sampling either of calcifications or enhancement.  I discussed this in person with Dr. Barron and this was his assessment.    She has not had a breast biopsy in the past.  She has her uterus and ovaries, is premenopausal, and takes no hormones.  Her family history includes the following: She has one son who is 20.  She has one sister, 7 brothers, no maternal aunts, 4 paternal aunts.  She has 2 brothers and 1 sister who had DVTs and/or  pulmonary emboli.  Her mother had breast cancer age 70, no other breast or ovarian cancer in her family.    Oncotype DX score dated July 25, 2017 returned as low risk score of 12 with a 10 year risk of distant recurrence on tamoxifen alone of 8%    Interval HIstory:  Genetic counseling on Jessica Suazo dated July 19, 2017-Gene DX multi-gene panel was sent. Negative for mutation.  Pathology from LEFT total mastectomy and LEFT SLNB with immediate recosntruction Dr Waterhouse returned as   In situ and invasive lobular carcinoma, intermediate grade, 3, 2, 1, no duct carcinoma in situ present but lobular carcinoma in situ was present.  Greatest dimension of invasive carcinoma 1.8 cm.  Margins are uninvolved with closest margin greater than 1 cm.  0 of 2 lymph nodes involved. Extensive non-atypical duct hyperplasia and LCIS in the mastectomy specimen.   Pathologic stage TI CN 0.   She has had one of HER-2 drains removed.  She denies any redness warmth from her incision.      Review of Systems:  Review of Systems   Constitutional: Positive for unexpected weight change (1 lb wt gain).   All other systems reviewed and are negative.       Past Medical and Surgical History:  Breast Biopsy History:  Patient had not had a breast biopsy prior to her cancer diagnosis.  Breast Cancer HIstory:  Patient does not have a past medical history of breast cancer.  Breast Operations, and year:  None   Obstetric/Gynecologic History:  Age menstrual periods began: 14  Patient is premenopausal, first day of last period: 7/1/2017  Number of pregnancies:1  Number of live births: 1  Number of abortions or miscarriages: 0  Age of delivery of first child: 29  Patient did not breast feed.  Length of time taking birth control pills:6 yrs   Patient has never taken hormone replacement  Patient still has uterus and ovaries.     Past Surgical History:   Procedure Laterality Date   • BREAST BIOPSY Right 2017   • BREAST RECONSTRUCTION Right 8/23/2017     Procedure: RT TISSUE EXPANDER W/ ALLODERM;  Surgeon: Maurine Waterhouse, MD;  Location: [facility];  Service:    •  SECTION     • HAND SURGERY Right    • MASTECTOMY Right 2017    Procedure: right total mastectomy, right axillary sentinel lymph node biopsy x2 with immediate reconstruction of right breast with expander.;  Surgeon: Dyan Kinsey MD;  Location: [facility];  Service:      Past Medical History:   Diagnosis Date   • Anxiety    • Asthma    • Cancer 2017    Right   • GERD (gastroesophageal reflux disease) 2017   • Herpes     + HSv no outbreaks   • HTN (hypertension) 2017   • Hypertension     TAKES NO MEDS   • Lichen sclerosus    • Lichen sclerosus et atrophicus 2017   • Migraine        Prior Hospitalizations, other than for surgery or childbirth, and year:  None     Social History     Social History   • Marital status:      Spouse name: N/A   • Number of children: 1   • Years of education: College     Occupational History   •  [employer]     Social History Main Topics   • Smoking status: Former Smoker     Packs/day: 0.50     Years: 10.00     Types: Cigarettes     Quit date:    • Smokeless tobacco: Never Used   • Alcohol use Yes      Comment: Occasional   • Drug use: No   • Sexual activity: Not Currently     Partners: Male     Birth control/ protection: Condom     Other Topics Concern   • Not on file     Social History Narrative     Patient is .  Patient is employed full time with the following occupation:   drinks caffeine    Family History:  Family History   Problem Relation Age of Onset   • Breast cancer Mother 70   • Hearing loss Mother    • Hyperlipidemia Mother    • Diabetes Father    • Asthma Sister    • Clotting disorder Sister    • COPD Sister    • Pulmonary embolism Sister    • Deep vein thrombosis Brother    • Pulmonary embolism Brother    • Malig Hyperthermia Neg Hx        Vital Signs:  /88 (BP  "Location: Left arm, Patient Position: Sitting, Cuff Size: Adult)  Pulse 80  Temp 98.5 °F (36.9 °C) (Temporal Artery )   Ht 61\" (154.9 cm)  Wt 197 lb (89.4 kg)  LMP 07/23/2017 (Approximate)  SpO2 99%  BMI 37.22 kg/m2     Medications:    Current Outpatient Prescriptions:   •  cephalexin (KEFLEX) 500 MG capsule, take 1 capsule by mouth four times a day, Disp: , Rfl: 0  •  fluticasone-salmeterol (ADVAIR) 100-50 MCG/DOSE DISKUS, Inhale 1 puff As Needed (SOB OR WHEEZING)., Disp: , Rfl:   •  gabapentin (NEURONTIN) 400 MG capsule, Take 400 mg by mouth As Needed (ARM PAIN)., Disp: , Rfl: 0  •  HYDROcodone-acetaminophen (NORCO) 5-325 MG per tablet, take 1 to 2 tablets by mouth every 4 hours if needed for pain, Disp: , Rfl: 0  •  ondansetron (ZOFRAN) 4 MG tablet, take 1-2 tablets by mouth every 8 hours if needed for nausea, Disp: , Rfl: 0  •  rosuvastatin (CRESTOR) 5 MG tablet, Take 5 mg by mouth Daily., Disp: , Rfl:   •  Sumatriptan-Naproxen Sodium (TREXIMET PO), Take 1 tablet by mouth As Needed (MIGRAINES)., Disp: , Rfl:   •  clobetasol (TEMOVATE) 0.05 % ointment, Apply  topically 2 (Two) Times a Day. Apply BID x 14 days, then QD x 14 days, then 3 times a week as needed for itching (Patient taking differently: Apply 1 application topically 2 (Two) Times a Day. Apply BID x 14 days, then QD x 14 days, then 3 times a week as needed for itching), Disp: 60 g, Rfl: 2  •  naproxen (NAPROSYN) 500 MG tablet, , Disp: , Rfl:      Allergies:  Allergies   Allergen Reactions   • Sesame Seed [Sesame Oil]      Sunflower seed and the food family of it         Physical Examination:  /88 (BP Location: Left arm, Patient Position: Sitting, Cuff Size: Adult)  Pulse 80  Temp 98.5 °F (36.9 °C) (Temporal Artery )   Ht 61\" (154.9 cm)  Wt 197 lb (89.4 kg)  LMP 07/23/2017 (Approximate)  SpO2 99%  BMI 37.22 kg/m2  General Appearance:  Patient is in no distress.  She is well kept and has an obese build.   Psychiatric:  Patient with " appropriate mood and affect. Alert and oriented to self, time, and place.    Breast, RIGHT:  Surgically absent with expander in place and partly expanded. Drain in place with serous drainage. No erythema of skin.    Breast, LEFT:  large sized, asymmetric with the contralateral surgically absent side.  Breast skin is without erythema, edema, rashes.  There are no visible abnormalities upon inspection during the arm-raising maneuver or with hands on hips in the sitting position. There is no nipple retraction, discharge or nipple/areolar skin changes.There are no masses palpable in the sitting or supine positions.    Lymphatic:  There is no axillary, cervical, infraclavicular, or supraclavicular adenopathy bilaterally.  Eyes:  Pupils are round and reactive to light.  Cardiovascular:  Heart rate and rhythm are regular.  Respiratory:  Lungs are clear bilaterally with no crackles or wheezes in any lung field.  Gastrointestinal:  Abdomen is soft, nondistended, and nontender.   Musculoskeletal:  Good strength in all 4 extremities.   There is good range of motion in both shoulders.    Skin:  No new skin lesions or rashes on the skin excluding the breast (see breast exam above).        Imagin/5/14    SCREENING MAMMOGRAM SYED, CLARA  Heterogeneously dense. BIRADS CATEGORY 2: Benign    16   BILATERAL SCREENING MAMMOGRAM SYED, CLARA  Multiple small benign-appearing calcifications bilaterally, more numerous on the right. Unchanged.  BIRADS CATEGORY 2    17  MAMMO SCREENING TOMOSYNTHESIS BILATERAL   SYED, CLARA  The breasts are heterogeneously dense. Suspicious irregular mass axillary portion of the right breast measuring 2 cm. This is a new finding. Ultrasound examination is recommended.  BIRADS CATEGORY 0    17  US BREAST RIGHT SYED, CLARA  Suspicious hypoechoic, irregular, shadowing mass at the 10:00 position right breast. Measures at least 1.3 cm.  BI-RADS CATEGORY  5    7/13/17  [facility] MRI BREAST BILATERAL   CLARA LYNCH   Right breast 10-o’clock 9 cm of the nipple enhancing mass that measures 1.9 cm in the anterior to posterior, 1.7 cm in the superior to inferior and 1.6 cm in the medial to lateral. A metallic clip is located along the posterior margin. No evidence for axillary adenopathy in either axilla. There is short segment linear enhancement that is scattered in both breasts and is below threshold. However, I do not have a recent mammogram for comparison. I recommend that a mammogram obtained in the last 6 months be pronounced for comparison to assess for the resented of any calcifications in the region which could warrant biopsy.   BIRADS Category 0    7/14/17  [facility]  ADDENDUM, BILATERAL BREAST MRI CLARA LYNCH  There are calcifications seen in an asymmetric distribution between the right and left breast. The preponderance of calcifications in the right breast relative to the left breast can be seen dating back to the prior examinations of 06/04/2014. In the middle third of the right breast at 12 o’clock position these is a cluster if microcalcifications that appears to have slightly increased in number over the prior years. Correlation with a stereotactic guided right breast biopsy of these calcifications is recommended. I recommend that the patient undergo a breast MRI examination in one year and 6 months mammographic follow-up of both breasts.     Pathology:  7/3/17     US GUIDED BREAST BIOPSY  CLARA LYNCH  ULTRASOUND-GUIDED CORE NEEDLE BIOPSY, RIGHT. CLIP MARKER PLACEMENT.  Biopsied 4 times 14 gauge Achieve. A clip marker was deployed. Postbiopsy mammogram shows the clip marker along the medial margin of the lesion.  7/3/17  [facility] PATHOLOGY  KAITLYN LYNCHASMITA HILLMAN. RIGHT BREAST, CORE BIOPSY: INVASIVE LOBULAR CARCINOMA   0.9 cm. Score I/III (tubules=3, nuclei=1, mitosis=1).  Foci of lobular carcinoma in situ (LCIS) identified. No  "capillary lymphatic nor perineural invasions identified.  ESTROGEN RECEPTOR          95%  PROGESTERONE RECEPTOR        95%  Her2     0    07/25/17  [facility]     PATHOLOGY  JESSICA SUAZO   Final Diagnosis   BREAST, RIGHT, DESIGNATED \"9:30\" CORE BIOPSY:              INVASIVE LOBULAR CARCINOMA WITH ASSOCIATED LOBULAR CARCINOMA IN SITU.              PREDICTED KELLY SCORE: TUBULAR SCORE 3, NUCLEAR SCORE 2, MITOTIC SCORE 1;                       OVERALL GRADE 2 (SCORE 6 OF 9).              MAXIMUM MEASURED LENGTH OF INVASIVE CARCINOMA IN A SINGLE CORE IS 4 MM.              FIBROCYSTIC CHANGES WITH DUCT DILATATION AND STASIS AND APOCRINE METAPLASIA.               FOCAL FIBROADENOMATOID CHANGE.              FOCAL DUCTAL HYPERPLASIA OF THE USUAL TYPE.      COMMENT: The above diagnosis is supported by e-cadherin immunohistochemistry.  ER, WI, and HER-2/tanya studies will be performed with results to be reported in an addendum.      08/23/17 [facility]  SURGICAL PATHOLOGY  JESSICA SUAZO   Final Diagnosis   1.  SENTINEL LYMPH NODE #1, RIGHT AXILLA, EXCISIONAL SPECIMEN (ONE NODE):                          NO TUMOR IDENTIFIED.     2.  SENTINEL LYMPH NODE #2,  RIGHT AXILLA, EXCISIONAL SPECIMEN (ONE NODE):                         NO TUMOR IDENTIFIED                         MULTIPLE NON-NECROTIZING GRANULOMAS.               NEGATIVE STAINS FOR FUNGAL AND ACID FAST ORGANISMS (with appropriate controls).     3.  RIGHT BREAST, TOTAL MASTECTOMY SPECIMEN:                         IN SITU AND INVASIVE LOBULAR CARCINOMA.                         NEGATIVE MARGINS.                         NO ATTACHED LYMPH NODES OR SKELETAL MUSCLE.                         NEGATIVE SKIN AND NIPPLE.               EXTENSIVE LCIS AND NON-ATYPICAL DUCT HYPERPLASIA.                         (SEE SYNOPTIC REPORT.)     DAYO/brb/th  IHC/a/TDJ           from genetic counseling on Jessica Suazo dated July 19, 2017 at the time of appointment she expressed an interest " in pursuing testing and so a Gene DX multi-gene panel was sent.  Results pending.    Procedures:      Assessment:   Diagnosis Plan   1. Malignant neoplasm of upper-outer quadrant of right female breast  Mammo Screening Modified With Tomosynthesis Left With CAD   2. FH: breast cancer  Mammo Screening Modified With Tomosynthesis Left With CAD   3. Obesity (BMI 35.0-39.9 without comorbidity)  Mammo Screening Modified With Tomosynthesis Left With CAD   4. Family history of DVT  Mammo Screening Modified With Tomosynthesis Left With CAD        1-  RIGHT 10:00 7.5 CFN- on exam 3 x 1.75 cm, on ultrasound 1.3 cm, on MRI 1.9 cm. Normal nodes on exam and MRI.  Inv lobular carcinoma, low grade, 3,1,1, foci LCIS, no LVI, ER 95, NE 95, her 2 tanya 0    MRI terence short segmental enhancement RIGHT- of indeterminate significance--Biopsy results returned as INvasive lobular carcinoma with LCIS, int grade, t3n2m1, max length 4mm. We will have her come back to discuss. I reviewed imagign w Dr Barron again and she will likely need a mastectomy.  Dr. Barron's procedure note is that the recent biopsy and the current biopsy R4 centimeters apart.  The most recent clip is 4 cm inferior and anterior to the previous biopsy site.    Clinical stage [id number]- IA    Pathology from LEFT total mastectomy and LEFT SLNB with immediate reconstruction Dr Waterhouse returned as   In situ and invasive lobular carcinoma, intermediate grade, 3, 2, 1, no duct carcinoma in situ present but lobular carcinoma in situ was present.  Greatest dimension of invasive carcinoma 1.8 cm.  Margins are uninvolved with closest margin greater than 1 cm.  0 of 2 lymph nodes involved. Extensive non-atypical duct hyperplasia and LCIS in the mastectomy specimen.   Pathologic stage TI CN 0.      Oncotype DX score dated July 25, 2017 returned as low risk score of 12 with a 10 year risk of distant recurrence on tamoxifen alone of 8%  - will not need xrt    2-  Mom age 70  Genetic  counseling on Jessica Suazo dated July 19, 2017-Gene DX multi-gene panel was sent. Negative for mutation.    3-  BMI 37    4-  2 brothers and one sister with DVT and/or PE  Patient's and her siblings hypercoag workup normal- Dr Gallegos    Plan:  Mrs. Suazo is doing well today postoperatively. She is healing nicely.    She had a 1.8 cm lobular cancer with extensive lobular carcinoma in situ.  Lymph nodes were clear.  Margins were clear.  She has seen Dr. Gallegos and he has concerns about her family history of hypercoagulability despite a negative hypercoagulable workup and is considering an aromatase inhibitor for her hormonal blockade.  He is going to have her see Dr. Dee Morales to discuss the possibility of a bilateral oophorectomy.  I will see her back in June.  I will schedule a left screening mammogram at [facility] with 3-D for June 23, 2018 and see her back after.  I asked her call me in the interim with any concerns or changes and we'd be happy to see her sooner.              Dyan Kinsey MD            Next Appointment:  Return for Next scheduled follow up, after imaging.      EMR Dragon/transcription disclaimer:    Much of this encounter note is an electronic transcription/translocation of spoken language to printed text.  The electronic translation of spoken language may permit erroneous, or at times, nonsensical words or phrases to be inadvertently transcribed.  Although I have reviewed the note from such areas, some may still exist.

## 2017-09-11 ENCOUNTER — OFFICE VISIT (OUTPATIENT)
Dept: OBSTETRICS AND GYNECOLOGY | Facility: CLINIC | Age: 52
End: 2017-09-11

## 2017-09-11 VITALS — DIASTOLIC BLOOD PRESSURE: 88 MMHG | SYSTOLIC BLOOD PRESSURE: 132 MMHG | WEIGHT: 197 LBS | BODY MASS INDEX: 37.22 KG/M2

## 2017-09-11 DIAGNOSIS — C50.411 BREAST CANCER OF UPPER-OUTER QUADRANT OF RIGHT FEMALE BREAST (HCC): ICD-10-CM

## 2017-09-11 DIAGNOSIS — Z82.49 FAMILY HISTORY OF DVT: Primary | ICD-10-CM

## 2017-09-11 PROCEDURE — 99213 OFFICE O/P EST LOW 20 MIN: CPT | Performed by: OBSTETRICS & GYNECOLOGY

## 2017-09-11 RX ORDER — SUMATRIPTAN 100 MG/1
100 TABLET, FILM COATED ORAL ONCE AS NEEDED
Refills: 0 | COMMUNITY
Start: 2017-09-06 | End: 2017-11-30

## 2017-09-11 NOTE — PROGRESS NOTES
Jessica Suazo is a 51 y.o. patient who presents for follow up of   Chief Complaint   Patient presents with   • Follow-up     from Dr. Gallegos       52 yo est pt here for discussion of possible  BSO.. She had a R mastectomy for a lobular cancer and was BRCA neg. She has ha strong family h/o thrombosis and there is concern about placing her on SERMs due to thrombotic risk. She has had a negative thrombophilia workup, as has the rest of her family, but given her family history we know she is at increase r/o thrombosis regardless. WE discussed BSO and that it will decrease her r/o recurrent breast cancer and lower her risk of ovarian cancer. We discussed the difference between  BSO alone and BSO with hysterectomy and at this point she would like do do a [facility] BSO to decrease the amount of time she misses from work.       The following portions of the patient's history were reviewed and updated as appropriate: allergies, current medications and problem list.    Review of Systems   Constitutional: Positive for fatigue and unexpected weight change. Negative for appetite change, chills and fever.   Respiratory: Negative for cough and shortness of breath.    Cardiovascular: Negative for chest pain and palpitations.   Gastrointestinal: Negative for abdominal distention, abdominal pain, constipation, diarrhea and nausea.   Genitourinary: Negative for dyspareunia, dysuria, menstrual problem, pelvic pain and vaginal discharge.   Skin: Negative for color change and rash.   Neurological: Negative for headaches.   Psychiatric/Behavioral: Negative for dysphoric mood. The patient is not nervous/anxious.        /88  Wt 197 lb (89.4 kg)  LMP 08/19/2017  Breastfeeding? No  BMI 37.22 kg/m2    Physical Exam   Constitutional: She is oriented to person, place, and time. She appears well-developed and well-nourished.   HENT:   Head: Normocephalic and atraumatic.   Abdominal: Soft. Bowel sounds are normal. She exhibits no  distension and no mass. There is no tenderness. There is no rebound and no guarding. No hernia.   Neurological: She is alert and oriented to person, place, and time.   Skin: Skin is warm and dry.   Psychiatric: She has a normal mood and affect. Her behavior is normal. Judgment and thought content normal.   Nursing note and vitals reviewed.    A/P:  1. R breast cancer- s/p R mastectomy. Pt has strong family history of thrombosis and would benefit from surgical risk reduction in the form of a BSO. Pt desires lsc BSO only to minimize her time off work. Plan lsc BSO and HS D&C. Risks, benefits and alternatives of the procedure were discussed, including , but not limited to: infection, bleeding, transfusion, injury to adjacent structures, reoperation, recurrent symptoms, thromboembolic events, aneasthesic complications and death. Pre/intra/postop course was reviewed and all questions answered. Patient was encouraged to call for any additional questions she might have in the future.       Assessment/Plan   Jessica was seen today for follow-up.    Diagnoses and all orders for this visit:    Family history of DVT    Breast cancer of upper-outer quadrant of right female breast  -     sodium chloride 0.9 % flush 1-10 mL; Infuse 1-10 mL into a venous catheter As Needed for Line Care.  -     Case Request; Standing  -     ceFAZolin (ANCEF) 2 g in sodium chloride 0.9 % 100 mL IVPB; Infuse 2 g into a venous catheter 60 Minutes Prior to Surgery.  -     Case Request    Other orders  -     Follow Anesthesia Guidelines / Standing Orders; Future  -     Follow Anesthesia Guidelines / Standing Orders; Standing  -     Obtain Informed Consent; Standing  -     Insert Peripheral IV; Standing  -     Saline Lock & Maintain IV Access; Standing  -     Place Sequential Compression Device; Standing                   No Follow-up on file.      Dee Morales MD    9/17/2017  9:27 PM

## 2017-09-15 ENCOUNTER — OFFICE VISIT (OUTPATIENT)
Dept: ONCOLOGY | Facility: CLINIC | Age: 52
End: 2017-09-15

## 2017-09-15 ENCOUNTER — LAB (OUTPATIENT)
Dept: LAB | Facility: HOSPITAL | Age: 52
End: 2017-09-15

## 2017-09-15 VITALS
BODY MASS INDEX: 37.23 KG/M2 | HEART RATE: 60 BPM | RESPIRATION RATE: 16 BRPM | SYSTOLIC BLOOD PRESSURE: 118 MMHG | TEMPERATURE: 98 F | WEIGHT: 197.2 LBS | DIASTOLIC BLOOD PRESSURE: 80 MMHG | OXYGEN SATURATION: 90 % | HEIGHT: 61 IN

## 2017-09-15 DIAGNOSIS — C50.411 BREAST CANCER OF UPPER-OUTER QUADRANT OF RIGHT FEMALE BREAST (HCC): Primary | ICD-10-CM

## 2017-09-15 DIAGNOSIS — Z82.49 FAMILY HISTORY OF DVT: ICD-10-CM

## 2017-09-15 LAB
ALBUMIN SERPL-MCNC: 3.5 G/DL (ref 3.5–5.2)
ALBUMIN/GLOB SERPL: 1.1 G/DL (ref 1.1–2.4)
ALP SERPL-CCNC: 72 U/L (ref 38–116)
ALT SERPL W P-5'-P-CCNC: 13 U/L (ref 0–33)
ANION GAP SERPL CALCULATED.3IONS-SCNC: 10.2 MMOL/L
AST SERPL-CCNC: 15 U/L (ref 0–32)
BASOPHILS # BLD AUTO: 0.06 10*3/MM3 (ref 0–0.1)
BASOPHILS NFR BLD AUTO: 0.9 % (ref 0–1.1)
BILIRUB SERPL-MCNC: 0.4 MG/DL (ref 0.1–1.2)
BUN BLD-MCNC: 14 MG/DL (ref 6–20)
BUN/CREAT SERPL: 21.9 (ref 7.3–30)
CALCIUM SPEC-SCNC: 8.8 MG/DL (ref 8.5–10.2)
CHLORIDE SERPL-SCNC: 102 MMOL/L (ref 98–107)
CO2 SERPL-SCNC: 26.8 MMOL/L (ref 22–29)
CREAT BLD-MCNC: 0.64 MG/DL (ref 0.6–1.1)
DEPRECATED RDW RBC AUTO: 40.7 FL (ref 37–49)
EOSINOPHIL # BLD AUTO: 0.47 10*3/MM3 (ref 0–0.36)
EOSINOPHIL NFR BLD AUTO: 6.9 % (ref 1–5)
ERYTHROCYTE [DISTWIDTH] IN BLOOD BY AUTOMATED COUNT: 12.4 % (ref 11.7–14.5)
GFR SERPL CREATININE-BSD FRML MDRD: 99 ML/MIN/1.73
GLOBULIN UR ELPH-MCNC: 3.2 GM/DL (ref 1.8–3.5)
GLUCOSE BLD-MCNC: 134 MG/DL (ref 74–124)
HCT VFR BLD AUTO: 39.2 % (ref 34–45)
HGB BLD-MCNC: 12.8 G/DL (ref 11.5–14.9)
HOLD SPECIMEN: NORMAL
IMM GRANULOCYTES # BLD: 0.07 10*3/MM3 (ref 0–0.03)
IMM GRANULOCYTES NFR BLD: 1 % (ref 0–0.5)
LYMPHOCYTES # BLD AUTO: 1.89 10*3/MM3 (ref 1–3.5)
LYMPHOCYTES NFR BLD AUTO: 27.7 % (ref 20–49)
MCH RBC QN AUTO: 29.2 PG (ref 27–33)
MCHC RBC AUTO-ENTMCNC: 32.7 G/DL (ref 32–35)
MCV RBC AUTO: 89.5 FL (ref 83–97)
MONOCYTES # BLD AUTO: 0.69 10*3/MM3 (ref 0.25–0.8)
MONOCYTES NFR BLD AUTO: 10.1 % (ref 4–12)
NEUTROPHILS # BLD AUTO: 3.65 10*3/MM3 (ref 1.5–7)
NEUTROPHILS NFR BLD AUTO: 53.4 % (ref 39–75)
NRBC BLD MANUAL-RTO: 0 /100 WBC (ref 0–0)
PLATELET # BLD AUTO: 314 10*3/MM3 (ref 150–375)
PMV BLD AUTO: 9.4 FL (ref 8.9–12.1)
POTASSIUM BLD-SCNC: 4 MMOL/L (ref 3.5–4.7)
PROT SERPL-MCNC: 6.7 G/DL (ref 6.3–8)
RBC # BLD AUTO: 4.38 10*6/MM3 (ref 3.9–5)
SODIUM BLD-SCNC: 139 MMOL/L (ref 134–145)
WBC NRBC COR # BLD: 6.83 10*3/MM3 (ref 4–10)

## 2017-09-15 PROCEDURE — 80053 COMPREHEN METABOLIC PANEL: CPT | Performed by: INTERNAL MEDICINE

## 2017-09-15 PROCEDURE — 85025 COMPLETE CBC W/AUTO DIFF WBC: CPT | Performed by: INTERNAL MEDICINE

## 2017-09-15 PROCEDURE — 36415 COLL VENOUS BLD VENIPUNCTURE: CPT | Performed by: INTERNAL MEDICINE

## 2017-09-15 PROCEDURE — 99214 OFFICE O/P EST MOD 30 MIN: CPT | Performed by: INTERNAL MEDICINE

## 2017-09-15 NOTE — PROGRESS NOTES
Subjective .     REASONS FOR FOLLOWUP:    1. Stage IA ([id number]) right breast cancer: 1.8 cm grade 2 invasive lobular carcinoma, ER positive (95%, IN positive (95%), HER-2/tanya negative (IHC 0), sentinel lymph node negative ×2.  Status post right mastectomy 8/23/17.  2. Genetics evaluation 7/19/17 with negative Gene DX panel test.  3. Strong family history of thrombosis with no detectable familial hypercoagulable factor.  No personal history of thrombosis.  Hypercoagulable evaluation negative 9/24/15, additional negative evaluation 8/3/17.  Continues chronically on aspirin 81 mg daily.    HISTORY OF PRESENT ILLNESS:  The patient is a 51 y.o. year old female who is here for follow-up with the above-mentioned history.    History of Present Illness  the patient returns today in follow-up after having seen Dr. Morales her gynecologist in follow-up to discuss a possible bilateral oophorectomy to achieve a surgical post menopausal status in order to facilitate adjuvant use of an aromatase inhibitor.  The patient did discuss this with Dr. Morales and is now scheduled for an outpatient laparoscopic procedure on 9/26/17.  In the interval since her last visit, she has had some continued erythema in the right mastectomy incision.  Her SANJUANA drain was removed.  She did have some low-grade fevers.  She has been placed back on a course of oral Keflex.  She did have some fluid removed from the right mastectomy region yesterday.    Past Medical History:   Diagnosis Date   • Anxiety    • Asthma    • Cancer 2017    Right   • GERD (gastroesophageal reflux disease) 6/23/2017   • Herpes     + HSv no outbreaks   • HTN (hypertension) 6/23/2017   • Hypertension     TAKES NO MEDS   • Lichen sclerosus    • Lichen sclerosus et atrophicus 6/23/2017   • Migraine      Past Surgical History:   Procedure Laterality Date   • BREAST BIOPSY Right 2017   • BREAST RECONSTRUCTION Right 8/23/2017    Procedure: RT TISSUE EXPANDER W/ ALLODERM;  Surgeon:  Maurine Waterhouse, MD;  Location: [facility];  Service:    •  SECTION     • HAND SURGERY Right    • MASTECTOMY Right 2017    Procedure: right total mastectomy, right axillary sentinel lymph node biopsy x2 with immediate reconstruction of right breast with expander.;  Surgeon: Dyan Kinsey MD;  Location: [facility];  Service:        ONCOLOGIC HISTORY:  (History from previous dates can be found in the separate document.)    Current Outpatient Prescriptions on File Prior to Visit   Medication Sig Dispense Refill   • cephalexin (KEFLEX) 500 MG capsule take 1 capsule by mouth four times a day  0   • clobetasol (TEMOVATE) 0.05 % ointment Apply  topically 2 (Two) Times a Day. Apply BID x 14 days, then QD x 14 days, then 3 times a week as needed for itching (Patient taking differently: Apply 1 application topically 2 (Two) Times a Day. Apply BID x 14 days, then QD x 14 days, then 3 times a week as needed for itching) 60 g 2   • fluticasone-salmeterol (ADVAIR) 100-50 MCG/DOSE DISKUS Inhale 1 puff As Needed (SOB OR WHEEZING).     • gabapentin (NEURONTIN) 400 MG capsule Take 400 mg by mouth As Needed (ARM PAIN).  0   • HYDROcodone-acetaminophen (NORCO) 5-325 MG per tablet take 1 to 2 tablets by mouth every 4 hours if needed for pain  0   • naproxen (NAPROSYN) 500 MG tablet      • ondansetron (ZOFRAN) 4 MG tablet take 1-2 tablets by mouth every 8 hours if needed for nausea  0   • rosuvastatin (CRESTOR) 5 MG tablet Take 5 mg by mouth Daily.     • SUMAtriptan (IMITREX) 100 MG tablet   0   • Sumatriptan-Naproxen Sodium (TREXIMET PO) Take 1 tablet by mouth As Needed (MIGRAINES).       No current facility-administered medications on file prior to visit.        ALLERGIES:     Allergies   Allergen Reactions   • Sesame Seed [Sesame Oil]      Sunflower seed and the food family of it       Social History     Social History   • Marital status:      Spouse name: N/A   • Number of children: 1   • Years of  education: College     Occupational History   •  [employer]     Social History Main Topics   • Smoking status: Former Smoker     Packs/day: 0.50     Years: 10.00     Types: Cigarettes     Quit date: 2007   • Smokeless tobacco: Never Used   • Alcohol use Yes      Comment: Occasional   • Drug use: No   • Sexual activity: Not Currently     Partners: Male     Birth control/ protection: Condom     Other Topics Concern   • Not on file     Social History Narrative     Family History   Problem Relation Age of Onset   • Breast cancer Mother 70   • Hearing loss Mother    • Hyperlipidemia Mother    • Diabetes Father    • Asthma Sister    • Clotting disorder Sister    • COPD Sister    • Pulmonary embolism Sister    • Deep vein thrombosis Brother    • Pulmonary embolism Brother    • Malig Hyperthermia Neg Hx         Review of Systems   Constitutional: Positive for fatigue. Negative for activity change, appetite change, fever and unexpected weight change.   HENT: Negative for congestion, mouth sores, nosebleeds, sore throat and voice change.    Respiratory: Negative for cough, shortness of breath and wheezing.    Cardiovascular: Negative for chest pain, palpitations and leg swelling.   Gastrointestinal: Negative for abdominal distention, abdominal pain, blood in stool, constipation, diarrhea, nausea and vomiting.   Endocrine: Negative for cold intolerance and heat intolerance.   Genitourinary: Negative for difficulty urinating, dysuria, frequency and hematuria.   Musculoskeletal: Negative for arthralgias, back pain, joint swelling and myalgias.   Skin: Positive for wound. Negative for rash.   Neurological: Negative for dizziness, syncope, weakness, light-headedness, numbness and headaches.   Hematological: Negative for adenopathy. Does not bruise/bleed easily.   Psychiatric/Behavioral: Negative for confusion and sleep disturbance. The patient is not nervous/anxious.          Objective      Vitals:    09/15/17  0857   BP: 118/80   Pulse: 60   Resp: 16   Temp: 98 °F (36.7 °C)   SpO2: 90%      Current Status 9/15/2017   ECOG score 0   Pain 0/10    Physical Exam   Constitutional: She is oriented to person, place, and time. She appears well-developed and well-nourished.   HENT:   Mouth/Throat: Oropharynx is clear and moist.   Eyes: Conjunctivae are normal.   Neck: No thyromegaly present.   Cardiovascular: Normal rate and regular rhythm.  Exam reveals no gallop and no friction rub.    No murmur heard.  Pulmonary/Chest: Breath sounds normal. No respiratory distress.   The right mastectomy incision was examined today.  There is some patchy erythema in the right chest wall around the site of the mastectomy.  There is no drainage identified.  There is no significant tenderness.   Abdominal: Soft. Bowel sounds are normal. She exhibits no distension. There is no tenderness.   Musculoskeletal: She exhibits no edema.   Lymphadenopathy:        Head (right side): No submandibular adenopathy present.     She has no cervical adenopathy.     She has no axillary adenopathy.        Right: No inguinal and no supraclavicular adenopathy present.        Left: No inguinal and no supraclavicular adenopathy present.   Neurological: She is alert and oriented to person, place, and time. She displays normal reflexes. No cranial nerve deficit. She exhibits normal muscle tone.   Skin: Skin is warm and dry. No rash noted.   Psychiatric: She has a normal mood and affect. Her behavior is normal.       RECENT LABS:  Hematology WBC   Date Value Ref Range Status   09/15/2017 6.83 4.00 - 10.00 10*3/mm3 Final     RBC   Date Value Ref Range Status   09/15/2017 4.38 3.90 - 5.00 10*6/mm3 Final     Hemoglobin   Date Value Ref Range Status   09/15/2017 12.8 11.5 - 14.9 g/dL Final     Hematocrit   Date Value Ref Range Status   09/15/2017 39.2 34.0 - 45.0 % Final     Platelets   Date Value Ref Range Status   09/15/2017 314 150 - 375 10*3/mm3 Final        Lab Results    Component Value Date    GLUCOSE 115 09/01/2017    BUN 15 09/01/2017    CREATININE 0.67 09/01/2017    EGFRIFNONA 94 09/01/2017    BCR 22.4 09/01/2017    K 4.0 09/01/2017    CO2 26.0 09/01/2017    CALCIUM 8.7 09/01/2017    ALBUMIN 3.80 09/01/2017    LABIL2 1.3 09/01/2017    AST 14 09/01/2017    ALT 13 09/01/2017         Assessment/Plan      1. Pathologic stage IA ([id number]) right breast cancer: The patient had a mammographically detected nonpalpable abnormality of the right breast measuring 1.3 cm by ultrasound in the 10 o'clock position with biopsy 7/3/17 showing an invasive lobular carcinoma largest size measuring 9 mm, grade 1, ER positive (95%), MI positive (95%), HER-2/tanya 0 (IHC).  There were foci of associated LCIS.  There was no lymphatic nor perineural invasion.  Oncotype DX testing was sent revealing a recurrent score in a low risk range at 12 (10 year recurrence risk of 8% with treatment with tamoxifen, 1% benefit from adjuvant chemotherapy) and confirmed ER positive, MI positive, HER-2/tanya negative.  MRI of the breasts 7/13/17 showed the biopsy-proven right breast malignancy measuring 1.9 cm with no axillary lymph node enlargement.  There was however in the middle third of the right breast a cluster of microcalcifications that appear to have increased over time.  An MRI guided biopsy of this area was performed 7/25/17 in the 9:30 position revealing an invasive lobular carcinoma, 4 mm, grade 2, ER positive (greater than 90%), MI positive (90%), HER-2/tanya 1+ recurrent sees IHC) with associated LCIS.  The patient was seen in the genetics clinic and underwent Gene DX panel testing 7/19/17 which was negative.  She has a family history of malignancy with breast cancer in her mother at age 70.  The patient saw Dr. Kinsey who discussed surgical management of her disease.  With evidence of multifocal involvement of the breast, it was recommended to undergo a right mastectomy with sentinel lymph node biopsy.   She did see Dr. Waterhouse as well in regards to reconstruction.    The patient was seen in initial consultation on 8/2/17.   We discussed the meaning of her Oncotype DX score which was in the low risk category at 12 indicating a 10 year recurrence risk of distant metastasis of 8% with tamoxifen alone.  We discussed that adjuvant chemotherapy would provide only approximately a 1% additional benefit.  It was not recommended for her to receive adjuvant chemotherapy.      Right mastectomy with implant reconstruction and sentinel lymph node biopsy performed on 8/23/17.  Pathology showed a 1.8 cm grade 2 invasive lobular carcinoma with negative margins (greater than 1 cm), extensive associated LCIS and non-atypical ductal hyperplasia, sentinel lymph node negative ×2 with evidence of multiple noncaseating granulomas.  Therefore, the patient had Stage IA ([id number]) disease.  Again, it was not recommended for her to receive adjuvant chemotherapy given her low risk Oncotype score.  This would provide only an additional 1% benefit.  We discussed pursuing adjuvant endocrine therapy.  As she is premenopausal, this would typically be undertaken with tamoxifen.  There is concern however given the patient's significant family history of thrombosis occurring at a young age with no identifiable/traceable risk factor.  Tamoxifen may confer a significant additional risk for her although she has not experienced a personal history of thrombosis.  Also, we discussed that there is suggestion from subgroup analysis of the BIG 1-98 study to suggest a significant additional benefit of aromatase inhibitor therapy over tamoxifen for invasive lobular cancer.  Therefore, we discussed the possibility of laparoscopic bilateral oophorectomy as she is nearing a perimenopausal age in order to facilitate administration of an aromatase inhibitor rather than tamoxifen.  This would confer significantly less thrombotic risk as well.      She returns today  having seen her gynecologist Dr. Morales in the interval.  They discussed laparoscopic oophorectomy and the patient agreed to proceed with surgery scheduled for 9/26/17.  This is to be performed as an outpatient.  At that point she will be surgically postmenopausal.  I will see her back 2 weeks postoperatively and we will discuss initiation of aromatase inhibitor adjuvant therapy at that time.  In the interval next week she will also obtain a baseline DEXA scan which we will review.  2. Family history of thrombosis: The patient has no personal history of thrombosis.  She has a significant family history of thrombosis however with a brother who developed a pulmonary embolism in his 30s, a brother developed a DVT at age 44, and a sister with a pulmonary embolism at age 55, all unprovoked.  The patient herself did undergo a hypercoagulable evaluation performed by her primary care physician [facility] 9/24/15 which showed negative prothrombin gene mutation analysis, negative factor V Leiden gene mutation analysis, negative lupus anticoagulant, negative anticardiolipin antibodies, normal homocystine, protein C activity 168%, protein S free 107%.   we did perform additional hypercoagulable labs from 8/3/17 showing protein S free antigen 109%, protein S total antigen 125%, protein S activity 83%, antithrombin III activity 96%, beta-2 GP 1 antibody panel negative.  Therefore, the patient has no detectable underlying hypercoagulable state.  None of her family members however had a detectable risk factor either.  This does produce some uncertainty in regards to her risk with tamoxifen.  She does continue on aspirin 81 mg a day which is certainly reasonable for now.  At this point, she has made the decision to proceed with oophorectomy laparoscopically with Dr. Morales on 9/26/17 following which we will plan treatment with adjuvant aromatase inhibitor therapy.  The upcoming procedure will be performed as an  outpatient and therefore I do not believe that she will require any thromboprophylaxis.  She will in the future continue on her prophylactic aspirin 81 mg per day.  3. Possible right chest wall cellulitis: The patient does have some patchy erythema around the mastectomy incision on the right side.  Her plastic surgeon yesterday asked her to resume Keflex.  4. History of tobacco abuse: The patient reports that she has been a social smoker in the past.  She has now however stop smoking completely.  We have discussed the need to remain off of smoking for multiple reasons including postoperative healing, cancer risk, and thrombotic risk.       Plan:    1. Baseline DEXA scan next week.  2. Proceed with laparoscopic oophorectomy with Dr. Morales on 9/26/17.  3. M.D. visit 2 weeks later with CBC, CMP.  We will discuss initiation of adjuvant aromatase inhibitor therapy at that time.

## 2017-09-17 RX ORDER — SODIUM CHLORIDE 0.9 % (FLUSH) 0.9 %
1-10 SYRINGE (ML) INJECTION AS NEEDED
Status: CANCELLED | OUTPATIENT
Start: 2017-09-17

## 2017-09-20 RX ORDER — KETOCONAZOLE 20 MG/G
1 CREAM TOPICAL DAILY
COMMUNITY
End: 2017-11-30

## 2017-09-20 RX ORDER — MULTIPLE VITAMINS W/ MINERALS TAB 9MG-400MCG
1 TAB ORAL DAILY
Status: ON HOLD | COMMUNITY
End: 2017-09-26

## 2017-09-21 ENCOUNTER — HOSPITAL ENCOUNTER (OUTPATIENT)
Dept: BONE DENSITY | Facility: HOSPITAL | Age: 52
Discharge: HOME | End: 2017-09-21
Attending: INTERNAL MEDICINE | Admitting: INTERNAL MEDICINE

## 2017-09-21 DIAGNOSIS — Z82.49 FAMILY HISTORY OF DVT: ICD-10-CM

## 2017-09-21 DIAGNOSIS — C50.411 BREAST CANCER OF UPPER-OUTER QUADRANT OF RIGHT FEMALE BREAST (HCC): ICD-10-CM

## 2017-09-21 PROCEDURE — 77080 DXA BONE DENSITY AXIAL: CPT

## 2017-09-25 ENCOUNTER — ANESTHESIA EVENT (OUTPATIENT)
Dept: PERIOP | Facility: HOSPITAL | Age: 52
End: 2017-09-25

## 2017-09-26 ENCOUNTER — ANESTHESIA (OUTPATIENT)
Dept: PERIOP | Facility: HOSPITAL | Age: 52
End: 2017-09-26

## 2017-09-26 ENCOUNTER — HOSPITAL ENCOUNTER (OUTPATIENT)
Facility: HOSPITAL | Age: 52
Setting detail: HOSPITAL OUTPATIENT SURGERY
Discharge: HOME | End: 2017-09-26
Attending: OBSTETRICS & GYNECOLOGY | Admitting: OBSTETRICS & GYNECOLOGY

## 2017-09-26 VITALS
SYSTOLIC BLOOD PRESSURE: 122 MMHG | HEIGHT: 61 IN | BODY MASS INDEX: 36.29 KG/M2 | DIASTOLIC BLOOD PRESSURE: 47 MMHG | HEART RATE: 73 BPM | TEMPERATURE: 98 F | RESPIRATION RATE: 16 BRPM | WEIGHT: 192.2 LBS | OXYGEN SATURATION: 98 %

## 2017-09-26 DIAGNOSIS — C50.411 BREAST CANCER OF UPPER-OUTER QUADRANT OF RIGHT FEMALE BREAST (HCC): ICD-10-CM

## 2017-09-26 PROBLEM — Z90.722 S/P BSO (BILATERAL SALPINGO-OOPHORECTOMY): Status: ACTIVE | Noted: 2017-09-26

## 2017-09-26 PROBLEM — Z98.890 S/P D&C (STATUS POST DILATION AND CURETTAGE): Status: ACTIVE | Noted: 2017-09-26

## 2017-09-26 LAB
GLUCOSE BLDC GLUCOMTR-MCNC: 126 MG/DL (ref 70–130)
HCG SERPL QL: NEGATIVE

## 2017-09-26 PROCEDURE — 25010000002 MIDAZOLAM PER 1 MG: Performed by: ANESTHESIOLOGY

## 2017-09-26 PROCEDURE — 58661 LAPAROSCOPY REMOVE ADNEXA: CPT | Performed by: OBSTETRICS & GYNECOLOGY

## 2017-09-26 PROCEDURE — 25010000003 CEFAZOLIN PER 500 MG: Performed by: OBSTETRICS & GYNECOLOGY

## 2017-09-26 PROCEDURE — 25010000002 KETOROLAC TROMETHAMINE PER 15 MG: Performed by: NURSE ANESTHETIST, CERTIFIED REGISTERED

## 2017-09-26 PROCEDURE — 58120 DILATION AND CURETTAGE: CPT | Performed by: OBSTETRICS & GYNECOLOGY

## 2017-09-26 PROCEDURE — 25010000002 SUCCINYLCHOLINE PER 20 MG: Performed by: NURSE ANESTHETIST, CERTIFIED REGISTERED

## 2017-09-26 PROCEDURE — 82962 GLUCOSE BLOOD TEST: CPT

## 2017-09-26 PROCEDURE — 25010000002 ONDANSETRON PER 1 MG: Performed by: ANESTHESIOLOGY

## 2017-09-26 PROCEDURE — 25010000002 FENTANYL CITRATE (PF) 100 MCG/2ML SOLUTION: Performed by: NURSE ANESTHETIST, CERTIFIED REGISTERED

## 2017-09-26 PROCEDURE — 25010000002 PROPOFOL 10 MG/ML EMULSION: Performed by: NURSE ANESTHETIST, CERTIFIED REGISTERED

## 2017-09-26 PROCEDURE — S0260 H&P FOR SURGERY: HCPCS | Performed by: OBSTETRICS & GYNECOLOGY

## 2017-09-26 PROCEDURE — 25010000002 NEOSTIGMINE PER 0.5 MG: Performed by: NURSE ANESTHETIST, CERTIFIED REGISTERED

## 2017-09-26 PROCEDURE — 25010000002 DEXAMETHASONE PER 1 MG: Performed by: ANESTHESIOLOGY

## 2017-09-26 PROCEDURE — 84703 CHORIONIC GONADOTROPIN ASSAY: CPT | Performed by: OBSTETRICS & GYNECOLOGY

## 2017-09-26 RX ORDER — FAMOTIDINE 20 MG/1
20 TABLET, FILM COATED ORAL EVERY 12 HOURS SCHEDULED
Status: DISCONTINUED | OUTPATIENT
Start: 2017-09-26 | End: 2017-09-26 | Stop reason: HOSPADM

## 2017-09-26 RX ORDER — SODIUM CHLORIDE, SODIUM LACTATE, POTASSIUM CHLORIDE, CALCIUM CHLORIDE 600; 310; 30; 20 MG/100ML; MG/100ML; MG/100ML; MG/100ML
9 INJECTION, SOLUTION INTRAVENOUS CONTINUOUS PRN
Status: DISCONTINUED | OUTPATIENT
Start: 2017-09-26 | End: 2017-09-26 | Stop reason: HOSPADM

## 2017-09-26 RX ORDER — MIDAZOLAM HYDROCHLORIDE 1 MG/ML
1 INJECTION INTRAMUSCULAR; INTRAVENOUS
Status: DISCONTINUED | OUTPATIENT
Start: 2017-09-26 | End: 2017-09-26 | Stop reason: HOSPADM

## 2017-09-26 RX ORDER — PROPOFOL 10 MG/ML
VIAL (ML) INTRAVENOUS AS NEEDED
Status: DISCONTINUED | OUTPATIENT
Start: 2017-09-26 | End: 2017-09-26 | Stop reason: SURG

## 2017-09-26 RX ORDER — LIDOCAINE HYDROCHLORIDE 10 MG/ML
INJECTION, SOLUTION EPIDURAL; INFILTRATION; INTRACAUDAL; PERINEURAL
Status: COMPLETED
Start: 2017-09-26 | End: 2017-09-26

## 2017-09-26 RX ORDER — FAMOTIDINE 10 MG/ML
20 INJECTION, SOLUTION INTRAVENOUS EVERY 12 HOURS SCHEDULED
Status: DISCONTINUED | OUTPATIENT
Start: 2017-09-26 | End: 2017-09-26 | Stop reason: HOSPADM

## 2017-09-26 RX ORDER — MAGNESIUM HYDROXIDE 1200 MG/15ML
LIQUID ORAL AS NEEDED
Status: DISCONTINUED | OUTPATIENT
Start: 2017-09-26 | End: 2017-09-26 | Stop reason: HOSPADM

## 2017-09-26 RX ORDER — ACETAMINOPHEN 650 MG/1
650 SUPPOSITORY RECTAL ONCE AS NEEDED
Status: DISCONTINUED | OUTPATIENT
Start: 2017-09-26 | End: 2017-09-26 | Stop reason: HOSPADM

## 2017-09-26 RX ORDER — OXYCODONE HYDROCHLORIDE AND ACETAMINOPHEN 5; 325 MG/1; MG/1
1-2 TABLET ORAL EVERY 4 HOURS PRN
Qty: 30 TABLET | Refills: 0 | Status: SHIPPED | OUTPATIENT
Start: 2017-09-26 | End: 2017-10-13

## 2017-09-26 RX ORDER — MIDAZOLAM HYDROCHLORIDE 1 MG/ML
2 INJECTION INTRAMUSCULAR; INTRAVENOUS
Status: DISCONTINUED | OUTPATIENT
Start: 2017-09-26 | End: 2017-09-26 | Stop reason: HOSPADM

## 2017-09-26 RX ORDER — ACETAMINOPHEN 325 MG/1
650 TABLET ORAL ONCE AS NEEDED
Status: DISCONTINUED | OUTPATIENT
Start: 2017-09-26 | End: 2017-09-26 | Stop reason: HOSPADM

## 2017-09-26 RX ORDER — SODIUM CHLORIDE 0.9 % (FLUSH) 0.9 %
1-10 SYRINGE (ML) INJECTION AS NEEDED
Status: DISCONTINUED | OUTPATIENT
Start: 2017-09-26 | End: 2017-09-26 | Stop reason: HOSPADM

## 2017-09-26 RX ORDER — ROCURONIUM BROMIDE 10 MG/ML
INJECTION, SOLUTION INTRAVENOUS AS NEEDED
Status: DISCONTINUED | OUTPATIENT
Start: 2017-09-26 | End: 2017-09-26 | Stop reason: SURG

## 2017-09-26 RX ORDER — HYDROMORPHONE HCL 110MG/55ML
0.5 PATIENT CONTROLLED ANALGESIA SYRINGE INTRAVENOUS
Status: DISCONTINUED | OUTPATIENT
Start: 2017-09-26 | End: 2017-09-26 | Stop reason: HOSPADM

## 2017-09-26 RX ORDER — MEPERIDINE HYDROCHLORIDE 25 MG/ML
12.5 INJECTION INTRAMUSCULAR; INTRAVENOUS; SUBCUTANEOUS
Status: DISCONTINUED | OUTPATIENT
Start: 2017-09-26 | End: 2017-09-26 | Stop reason: HOSPADM

## 2017-09-26 RX ORDER — SODIUM CHLORIDE 9 MG/ML
40 INJECTION, SOLUTION INTRAVENOUS AS NEEDED
Status: DISCONTINUED | OUTPATIENT
Start: 2017-09-26 | End: 2017-09-26 | Stop reason: HOSPADM

## 2017-09-26 RX ORDER — GLYCOPYRROLATE 0.2 MG/ML
0.2 INJECTION INTRAMUSCULAR; INTRAVENOUS
Status: DISCONTINUED | OUTPATIENT
Start: 2017-09-26 | End: 2017-09-26 | Stop reason: HOSPADM

## 2017-09-26 RX ORDER — DEXAMETHASONE SODIUM PHOSPHATE 4 MG/ML
8 INJECTION, SOLUTION INTRA-ARTICULAR; INTRALESIONAL; INTRAMUSCULAR; INTRAVENOUS; SOFT TISSUE ONCE
Status: COMPLETED | OUTPATIENT
Start: 2017-09-26 | End: 2017-09-26

## 2017-09-26 RX ORDER — ONDANSETRON 2 MG/ML
4 INJECTION INTRAMUSCULAR; INTRAVENOUS ONCE AS NEEDED
Status: DISCONTINUED | OUTPATIENT
Start: 2017-09-26 | End: 2017-09-26 | Stop reason: HOSPADM

## 2017-09-26 RX ORDER — ONDANSETRON 2 MG/ML
4 INJECTION INTRAMUSCULAR; INTRAVENOUS ONCE AS NEEDED
Status: COMPLETED | OUTPATIENT
Start: 2017-09-26 | End: 2017-09-26

## 2017-09-26 RX ORDER — SUCCINYLCHOLINE CHLORIDE 20 MG/ML
INJECTION INTRAMUSCULAR; INTRAVENOUS AS NEEDED
Status: DISCONTINUED | OUTPATIENT
Start: 2017-09-26 | End: 2017-09-26 | Stop reason: SURG

## 2017-09-26 RX ORDER — LIDOCAINE HYDROCHLORIDE 20 MG/ML
INJECTION, SOLUTION INFILTRATION; PERINEURAL AS NEEDED
Status: DISCONTINUED | OUTPATIENT
Start: 2017-09-26 | End: 2017-09-26 | Stop reason: SURG

## 2017-09-26 RX ORDER — OXYCODONE HYDROCHLORIDE AND ACETAMINOPHEN 5; 325 MG/1; MG/1
1 TABLET ORAL ONCE AS NEEDED
Status: COMPLETED | OUTPATIENT
Start: 2017-09-26 | End: 2017-09-26

## 2017-09-26 RX ORDER — DIPHENHYDRAMINE HYDROCHLORIDE 50 MG/ML
12.5 INJECTION INTRAMUSCULAR; INTRAVENOUS
Status: DISCONTINUED | OUTPATIENT
Start: 2017-09-26 | End: 2017-09-26 | Stop reason: HOSPADM

## 2017-09-26 RX ORDER — FENTANYL CITRATE 50 UG/ML
INJECTION, SOLUTION INTRAMUSCULAR; INTRAVENOUS AS NEEDED
Status: DISCONTINUED | OUTPATIENT
Start: 2017-09-26 | End: 2017-09-26 | Stop reason: SURG

## 2017-09-26 RX ORDER — IBUPROFEN 600 MG/1
600 TABLET ORAL EVERY 6 HOURS PRN
Qty: 30 TABLET | Refills: 0 | Status: SHIPPED | OUTPATIENT
Start: 2017-09-26 | End: 2017-11-07 | Stop reason: HOSPADM

## 2017-09-26 RX ORDER — KETOROLAC TROMETHAMINE 30 MG/ML
INJECTION, SOLUTION INTRAMUSCULAR; INTRAVENOUS AS NEEDED
Status: DISCONTINUED | OUTPATIENT
Start: 2017-09-26 | End: 2017-09-26 | Stop reason: SURG

## 2017-09-26 RX ADMIN — GLYCOPYRROLATE 0.1 MG: 0.2 INJECTION INTRAMUSCULAR; INTRAVENOUS at 10:26

## 2017-09-26 RX ADMIN — PROPOFOL 150 MG: 10 INJECTION, EMULSION INTRAVENOUS at 10:27

## 2017-09-26 RX ADMIN — DEXAMETHASONE SODIUM PHOSPHATE 8 MG: 4 INJECTION, SOLUTION INTRAMUSCULAR; INTRAVENOUS at 09:54

## 2017-09-26 RX ADMIN — NEOSTIGMINE METHYLSULFATE 3 MG: 1 INJECTION, SOLUTION INTRAMUSCULAR; INTRAVENOUS; SUBCUTANEOUS at 11:12

## 2017-09-26 RX ADMIN — MIDAZOLAM HYDROCHLORIDE 2 MG: 1 INJECTION, SOLUTION INTRAMUSCULAR; INTRAVENOUS at 10:17

## 2017-09-26 RX ADMIN — OXYCODONE HYDROCHLORIDE AND ACETAMINOPHEN 1 TABLET: 5; 325 TABLET ORAL at 12:10

## 2017-09-26 RX ADMIN — FENTANYL CITRATE 50 MCG: 50 INJECTION, SOLUTION INTRAMUSCULAR; INTRAVENOUS at 10:26

## 2017-09-26 RX ADMIN — ONDANSETRON 4 MG: 2 INJECTION, SOLUTION INTRAMUSCULAR; INTRAVENOUS at 09:54

## 2017-09-26 RX ADMIN — GLYCOPYRROLATE 0.2 MG: 0.2 INJECTION INTRAMUSCULAR; INTRAVENOUS at 09:54

## 2017-09-26 RX ADMIN — KETOROLAC TROMETHAMINE 30 MG: 30 INJECTION INTRAMUSCULAR; INTRAVENOUS at 11:09

## 2017-09-26 RX ADMIN — FENTANYL CITRATE 50 MCG: 50 INJECTION, SOLUTION INTRAMUSCULAR; INTRAVENOUS at 10:49

## 2017-09-26 RX ADMIN — FAMOTIDINE 20 MG: 10 INJECTION, SOLUTION INTRAVENOUS at 09:54

## 2017-09-26 RX ADMIN — ROCURONIUM BROMIDE 28 MG: 10 INJECTION INTRAVENOUS at 10:32

## 2017-09-26 RX ADMIN — SODIUM CHLORIDE, POTASSIUM CHLORIDE, SODIUM LACTATE AND CALCIUM CHLORIDE: 600; 310; 30; 20 INJECTION, SOLUTION INTRAVENOUS at 10:24

## 2017-09-26 RX ADMIN — SUCCINYLCHOLINE CHLORIDE 100 MG: 20 INJECTION, SOLUTION INTRAMUSCULAR; INTRAVENOUS at 10:27

## 2017-09-26 RX ADMIN — ROCURONIUM BROMIDE 2 MG: 10 INJECTION INTRAVENOUS at 10:26

## 2017-09-26 RX ADMIN — GLYCOPYRROLATE 0.4 MG: 0.2 INJECTION INTRAMUSCULAR; INTRAVENOUS at 11:12

## 2017-09-26 RX ADMIN — CEFAZOLIN SODIUM 2 G: 2 SOLUTION INTRAVENOUS at 10:25

## 2017-09-26 RX ADMIN — LIDOCAINE HYDROCHLORIDE 50 MG: 20 INJECTION, SOLUTION INFILTRATION; PERINEURAL at 10:25

## 2017-09-26 RX ADMIN — LIDOCAINE HYDROCHLORIDE 0.5 ML: 10 INJECTION, SOLUTION EPIDURAL; INFILTRATION; INTRACAUDAL; PERINEURAL at 09:45

## 2017-09-26 NOTE — ANESTHESIA POSTPROCEDURE EVALUATION
Patient: Jessica Suazo    Procedure Summary     Date Anesthesia Start Anesthesia Stop Room / Location    09/26/17 1024 [id number] [facility] 2 / [facility]       Procedure Diagnosis Surgeon Provider    DIAGNOSTIC LAPAROSCOPY,laproscopic bilateral salpingoophorectomy  (Bilateral Abdomen); DILATATION AND CURETTAGE HYSTEROSCOPY (N/A Uterus) Breast cancer of upper-outer quadrant of right female breast  (Breast cancer of upper-outer quadrant of right female breast [C50.411]) MD Marco Hernandez CRNA          Anesthesia Type: general  Last vitals  BP        Temp        Pulse       Resp        SpO2          Post Anesthesia Care and Evaluation      Comments: Pt discharged prior to anesthesia evaluation

## 2017-09-26 NOTE — ANESTHESIA PREPROCEDURE EVALUATION
Anesthesia Evaluation     no history of anesthetic complications:  NPO Solid Status: > 8 hours  NPO Liquid Status: > 8 hours     Airway   Mallampati: I  TM distance: >3 FB  Neck ROM: full  possible difficult intubation and small opening  Dental - normal exam     Pulmonary - normal exam    breath sounds clear to auscultation  (+) a smoker (none for 10 years) Former, asthma (used inhalers this am),   Cardiovascular - normal exam    Rhythm: regular  Rate: normal    Hypertension: history of , no meds  DVT: family history of.      Neuro/Psych  Headaches: migraines.  GI/Hepatic/Renal/Endo    (+) obesity,  GERD (prevacid as needed),     Musculoskeletal     Abdominal    Substance History - negative use     OB/GYN negative ob/gyn ROS         Other      history of cancer (right mastectomy Aug 23, 2017) active                                    Anesthesia Plan    ASA 2     general     intravenous induction   Anesthetic plan and risks discussed with patient.

## 2017-09-26 NOTE — ANESTHESIA PROCEDURE NOTES
Airway  Urgency: elective    Airway not difficult    General Information and Staff    Patient location during procedure: OR  CRNA: RAVINDRA GRADY    Indications and Patient Condition  Indications for airway management: airway protection    Preoxygenated: yes  MILS maintained throughout  Mask difficulty assessment: 1 - vent by mask    Final Airway Details  Final airway type: endotracheal airway      Successful airway: ETT  Cuffed: yes   Successful intubation technique: direct laryngoscopy  Endotracheal tube insertion site: oral  Blade: Dajuan  Blade size: #3 (3.5)  ETT size: 7.0 mm  Placement verified by: chest auscultation and capnometry   Measured from: lips  ETT to lips (cm): 21  Number of attempts at approach: 1    Additional Comments  To OR, monitors and O2 on. Smooth IV ind. - intubated x 1 attempt through clear cords + BBS. Tape OU. Pressure points checked and padded.

## 2017-09-29 LAB
LAB AP CASE REPORT: NORMAL
Lab: NORMAL
PATH REPORT.FINAL DX SPEC: NORMAL

## 2017-10-09 ENCOUNTER — OFFICE VISIT (OUTPATIENT)
Dept: OBSTETRICS AND GYNECOLOGY | Facility: CLINIC | Age: 52
End: 2017-10-09

## 2017-10-09 VITALS
SYSTOLIC BLOOD PRESSURE: 130 MMHG | HEIGHT: 61 IN | DIASTOLIC BLOOD PRESSURE: 82 MMHG | WEIGHT: 196 LBS | BODY MASS INDEX: 37 KG/M2

## 2017-10-09 DIAGNOSIS — Z30.09 SCREENING AND EVALUATION FOR FEMALE STERILIZATION: ICD-10-CM

## 2017-10-09 DIAGNOSIS — Z90.722 S/P BSO (BILATERAL SALPINGO-OOPHORECTOMY): Primary | ICD-10-CM

## 2017-10-09 DIAGNOSIS — Z98.890 S/P D&C (STATUS POST DILATION AND CURETTAGE): ICD-10-CM

## 2017-10-09 DIAGNOSIS — Z12.11 SCREENING FOR COLON CANCER: ICD-10-CM

## 2017-10-09 PROCEDURE — 99213 OFFICE O/P EST LOW 20 MIN: CPT | Performed by: OBSTETRICS & GYNECOLOGY

## 2017-10-09 RX ORDER — CLOBETASOL PROPIONATE 0.5 MG/G
OINTMENT TOPICAL AS NEEDED
Refills: 0 | COMMUNITY
Start: 2017-10-05 | End: 2017-12-15 | Stop reason: HOSPADM

## 2017-10-09 RX ORDER — NAPROXEN 500 MG/1
TABLET ORAL
Refills: 0 | COMMUNITY
Start: 2017-10-05 | End: 2017-11-07 | Stop reason: HOSPADM

## 2017-10-09 NOTE — PROGRESS NOTES
"Surgical follow up visit     Jessica Suazo is a 51 y.o. female who presents to the clinic 2 weeks status post bilateral oophorectomy for risk reduction. Eating a regular diet without difficulty. Bowel movements are normal. The patient is not having any pain. No VB. Having some hot flashes and we discussed SSRI to reduce symptoms.     The following portions of the patient's history were reviewed and updated as appropriate: allergies, current medications, past family history, past medical history, past social history, past surgical history and problem list.    Review of Systems  Pertinent items are noted in HPI.     Objective    /82  Ht 61\" (154.9 cm)  Wt 196 lb (88.9 kg)  LMP 09/13/2017 (Exact Date)  BMI 37.03 kg/m2    General:  alert, appears stated age and cooperative   Abdomen: soft, bowel sounds active, non-tender   Incision: healing well, no drainage, no erythema, no hernia, no seroma, no swelling, no dehiscence, incision well approximated   Vulva :   NE   Vagina:    Cervix:    Uterus:    Adnexa:         Assessment     1) Post op lsc BSO & D&C. Path for both B9.   Doing well postoperatively.  2) Operative findings again reviewed. Pathology report discussed.     Plan    1. Continue any current medications.  2. Wound care discussed.  3. Activity restrictions: none to continue  4. Anticipated return to work: next Monday..  5. Follow up:RTO annual 5/2018  6. Hot flashes- not hormonal candidate. Start Paxil 10 mg po qhs.   7. Refer for Cscope.    Dee Morales MD     10/10/2017  8:26 PM      "

## 2017-10-10 ENCOUNTER — OFFICE VISIT (OUTPATIENT)
Dept: ONCOLOGY | Facility: CLINIC | Age: 52
End: 2017-10-10

## 2017-10-10 ENCOUNTER — LAB (OUTPATIENT)
Dept: LAB | Facility: HOSPITAL | Age: 52
End: 2017-10-10

## 2017-10-10 VITALS
RESPIRATION RATE: 16 BRPM | DIASTOLIC BLOOD PRESSURE: 80 MMHG | WEIGHT: 196.8 LBS | BODY MASS INDEX: 37.16 KG/M2 | HEIGHT: 61 IN | SYSTOLIC BLOOD PRESSURE: 122 MMHG | HEART RATE: 72 BPM | TEMPERATURE: 97.9 F

## 2017-10-10 DIAGNOSIS — R23.2 HOT FLASHES: ICD-10-CM

## 2017-10-10 DIAGNOSIS — M85.852 OSTEOPENIA OF LEFT THIGH: ICD-10-CM

## 2017-10-10 DIAGNOSIS — C50.411 MALIGNANT NEOPLASM OF UPPER-OUTER QUADRANT OF RIGHT FEMALE BREAST, UNSPECIFIED ESTROGEN RECEPTOR STATUS (HCC): Primary | ICD-10-CM

## 2017-10-10 DIAGNOSIS — C50.411 MALIGNANT NEOPLASM OF UPPER-OUTER QUADRANT OF RIGHT BREAST IN FEMALE, ESTROGEN RECEPTOR POSITIVE (HCC): Primary | ICD-10-CM

## 2017-10-10 DIAGNOSIS — Z82.49 FAMILY HISTORY OF DVT: ICD-10-CM

## 2017-10-10 DIAGNOSIS — C50.411 BREAST CANCER OF UPPER-OUTER QUADRANT OF RIGHT FEMALE BREAST (HCC): ICD-10-CM

## 2017-10-10 DIAGNOSIS — Z17.0 MALIGNANT NEOPLASM OF UPPER-OUTER QUADRANT OF RIGHT BREAST IN FEMALE, ESTROGEN RECEPTOR POSITIVE (HCC): Primary | ICD-10-CM

## 2017-10-10 LAB
ALBUMIN SERPL-MCNC: 4 G/DL (ref 3.5–5.2)
ALBUMIN/GLOB SERPL: 1.3 G/DL (ref 1.1–2.4)
ALP SERPL-CCNC: 81 U/L (ref 38–116)
ALT SERPL W P-5'-P-CCNC: 15 U/L (ref 0–33)
ANION GAP SERPL CALCULATED.3IONS-SCNC: 12.2 MMOL/L
AST SERPL-CCNC: 16 U/L (ref 0–32)
BASOPHILS # BLD AUTO: 0.08 10*3/MM3 (ref 0–0.1)
BASOPHILS NFR BLD AUTO: 1.1 % (ref 0–1.1)
BILIRUB SERPL-MCNC: 0.4 MG/DL (ref 0.1–1.2)
BUN BLD-MCNC: 18 MG/DL (ref 6–20)
BUN/CREAT SERPL: 25.4 (ref 7.3–30)
CALCIUM SPEC-SCNC: 9.2 MG/DL (ref 8.5–10.2)
CHLORIDE SERPL-SCNC: 102 MMOL/L (ref 98–107)
CO2 SERPL-SCNC: 25.8 MMOL/L (ref 22–29)
CREAT BLD-MCNC: 0.71 MG/DL (ref 0.6–1.1)
DEPRECATED RDW RBC AUTO: 40.9 FL (ref 37–49)
EOSINOPHIL # BLD AUTO: 0.47 10*3/MM3 (ref 0–0.36)
EOSINOPHIL NFR BLD AUTO: 6.5 % (ref 1–5)
ERYTHROCYTE [DISTWIDTH] IN BLOOD BY AUTOMATED COUNT: 12.5 % (ref 11.7–14.5)
GFR SERPL CREATININE-BSD FRML MDRD: 87 ML/MIN/1.73
GLOBULIN UR ELPH-MCNC: 3.1 GM/DL (ref 1.8–3.5)
GLUCOSE BLD-MCNC: 133 MG/DL (ref 74–124)
HCT VFR BLD AUTO: 43.1 % (ref 34–45)
HGB BLD-MCNC: 13.9 G/DL (ref 11.5–14.9)
HOLD SPECIMEN: NORMAL
IMM GRANULOCYTES # BLD: 0.05 10*3/MM3 (ref 0–0.03)
IMM GRANULOCYTES NFR BLD: 0.7 % (ref 0–0.5)
LYMPHOCYTES # BLD AUTO: 2.07 10*3/MM3 (ref 1–3.5)
LYMPHOCYTES NFR BLD AUTO: 28.4 % (ref 20–49)
MCH RBC QN AUTO: 28.8 PG (ref 27–33)
MCHC RBC AUTO-ENTMCNC: 32.3 G/DL (ref 32–35)
MCV RBC AUTO: 89.4 FL (ref 83–97)
MONOCYTES # BLD AUTO: 0.64 10*3/MM3 (ref 0.25–0.8)
MONOCYTES NFR BLD AUTO: 8.8 % (ref 4–12)
NEUTROPHILS # BLD AUTO: 3.97 10*3/MM3 (ref 1.5–7)
NEUTROPHILS NFR BLD AUTO: 54.5 % (ref 39–75)
NRBC BLD MANUAL-RTO: 0 /100 WBC (ref 0–0)
PLATELET # BLD AUTO: 233 10*3/MM3 (ref 150–375)
PMV BLD AUTO: 10 FL (ref 8.9–12.1)
POTASSIUM BLD-SCNC: 4.4 MMOL/L (ref 3.5–4.7)
PROT SERPL-MCNC: 7.1 G/DL (ref 6.3–8)
RBC # BLD AUTO: 4.82 10*6/MM3 (ref 3.9–5)
SODIUM BLD-SCNC: 140 MMOL/L (ref 134–145)
WBC NRBC COR # BLD: 7.28 10*3/MM3 (ref 4–10)

## 2017-10-10 PROCEDURE — 99214 OFFICE O/P EST MOD 30 MIN: CPT | Performed by: INTERNAL MEDICINE

## 2017-10-10 PROCEDURE — 82306 VITAMIN D 25 HYDROXY: CPT | Performed by: INTERNAL MEDICINE

## 2017-10-10 PROCEDURE — 36415 COLL VENOUS BLD VENIPUNCTURE: CPT | Performed by: INTERNAL MEDICINE

## 2017-10-10 PROCEDURE — 80053 COMPREHEN METABOLIC PANEL: CPT

## 2017-10-10 PROCEDURE — 85025 COMPLETE CBC W/AUTO DIFF WBC: CPT

## 2017-10-10 RX ORDER — VENLAFAXINE HYDROCHLORIDE 37.5 MG/1
37.5 CAPSULE, EXTENDED RELEASE ORAL DAILY
Qty: 30 CAPSULE | Refills: 4 | Status: SHIPPED | OUTPATIENT
Start: 2017-10-10 | End: 2018-02-02 | Stop reason: DRUGHIGH

## 2017-10-10 RX ORDER — ANASTROZOLE 1 MG/1
1 TABLET ORAL DAILY
Qty: 30 TABLET | Refills: 5 | Status: SHIPPED | OUTPATIENT
Start: 2017-10-10 | End: 2018-04-13 | Stop reason: SDUPTHER

## 2017-10-10 RX ORDER — PAROXETINE 10 MG/1
10 TABLET, FILM COATED ORAL EVERY MORNING
Qty: 30 TABLET | Refills: 11 | Status: SHIPPED | OUTPATIENT
Start: 2017-10-10 | End: 2017-11-30

## 2017-10-11 LAB — 25(OH)D3 SERPL-MCNC: 17.8 NG/ML (ref 30–100)

## 2017-10-11 NOTE — PROGRESS NOTES
Subjective .     REASONS FOR FOLLOWUP:    1. Stage IA ([id number]) right breast cancer: 1.8 cm grade 2 invasive lobular carcinoma, ER positive (95%, SD positive (95%), HER-2/tanya negative (IHC 0), sentinel lymph node negative ×2.  Status post right mastectomy 8/23/17.  2. Genetics evaluation 7/19/17 with negative Gene DX panel test.  3. Strong family history of thrombosis with no detectable familial hypercoagulable factor.  No personal history of thrombosis.  Hypercoagulable evaluation negative 9/24/15, additional negative evaluation 8/3/17.  Continues chronically on aspirin 81 mg daily.  4. Status post bilateral salpingo-oophorectomy 9/26/17 achieving surgical postmenopausal state.  5. Osteopenia with Baseline DEXA scan 9/21/17 showing maximal T score -1.2 left femoral neck.  6. Initiation of adjuvant Arimidex 1 mg daily 10/10/17.  7. Postmenopausal hot flashes, initiated Effexor XR 37.5 mg daily 10/10/17.    HISTORY OF PRESENT ILLNESS:  The patient is a 51 y.o. year old female who is here for follow-up with the above-mentioned history.    History of Present Illness  the patient returns today in follow-up after having undergone a laparoscopic bilateral salpingo-oophorectomy as well as D&C with Dr. Morales on 9/26/17.  Pathology from her surgery was benign.  She tolerated the procedure fairly well with only some minimal postoperative pain.  She is nearly off of her postoperative pain medication.  She did have some mild constipation related to this which is improving.  She has developed some postmenopausal hot flashes which have been somewhat unpleasant and have been awakening her at night.      Past Medical History:   Diagnosis Date   • Anxiety    • Asthma    • Breast cancer    • Cancer 2017    Right   • GERD (gastroesophageal reflux disease) 6/23/2017   • Herpes     + HSv no outbreaks   • Hypertension     TAKES NO MEDS, borderline in past   • Lichen sclerosus    • Lichen sclerosus et atrophicus 6/23/2017   •  Migraine      Past Surgical History:   Procedure Laterality Date   • BREAST BIOPSY Right 2017   • BREAST RECONSTRUCTION Right 2017    Procedure: RT TISSUE EXPANDER W/ ALLODERM;  Surgeon: Maurine Waterhouse, MD;  Location: [facility] OR;  Service:    •  SECTION     • D&C HYSTEROSCOPY N/A 2017    Procedure: DILATATION AND CURETTAGE HYSTEROSCOPY;  Surgeon: Dee Morales MD;  Location: [facility] OR;  Service:    • DIAGNOSTIC LAPAROSCOPY Bilateral 2017    Procedure: DIAGNOSTIC LAPAROSCOPY,laproscopic bilateral salpingoophorectomy ;  Surgeon: Dee Morales MD;  Location: [facility] OR;  Service:    • DILATATION AND CURETTAGE     • HAND SURGERY Right    • MASTECTOMY Right 2017    Procedure: right total mastectomy, right axillary sentinel lymph node biopsy x2 with immediate reconstruction of right breast with expander.;  Surgeon: Dyan Kinsey MD;  Location: [facility] OR;  Service:    • PELVIC LAPAROSCOPY      BSO       ONCOLOGIC HISTORY:  (History from previous dates can be found in the separate document.)    Current Outpatient Prescriptions on File Prior to Visit   Medication Sig Dispense Refill   • clobetasol (TEMOVATE) 0.05 % ointment   0   • fluticasone-salmeterol (ADVAIR) 100-50 MCG/DOSE DISKUS Inhale 1 puff As Needed (SOB OR WHEEZING).     • gabapentin (NEURONTIN) 400 MG capsule Take 400 mg by mouth As Needed (ARM PAIN).  0   • ibuprofen (ADVIL,MOTRIN) 600 MG tablet Take 1 tablet by mouth Every 6 (Six) Hours As Needed for Mild Pain . 30 tablet 0   • ketoconazole (NIZORAL) 2 % cream Apply 1 application topically Daily.     • naproxen (NAPROSYN) 500 MG tablet   0   • ondansetron (ZOFRAN) 4 MG tablet take 1-2 tablets by mouth every 8 hours if needed for nausea  0   • oxyCODONE-acetaminophen (PERCOCET) 5-325 MG per tablet Take 1-2 tablets by mouth Every 4 (Four) Hours As Needed (Pain). 30 tablet 0   • rosuvastatin (CRESTOR) 5 MG tablet Take 5 mg by mouth Every Night.      • SUMAtriptan (IMITREX) 100 MG tablet Take 100 mg by mouth 1 (One) Time As Needed for Migraine.  0   • PARoxetine (PAXIL) 10 MG tablet Take 1 tablet by mouth Every Morning. 30 tablet 11     No current facility-administered medications on file prior to visit.        ALLERGIES:     Allergies   Allergen Reactions   • Sesame Seed [Sesame Oil]      Sunflower seed and the food family of it       Social History     Social History   • Marital status:      Spouse name: N/A   • Number of children: 1   • Years of education: College     Occupational History   •  [employer]     Social History Main Topics   • Smoking status: Former Smoker     Packs/day: 0.50     Years: 10.00     Types: Cigarettes     Quit date: 2007   • Smokeless tobacco: Never Used   • Alcohol use Yes      Comment: Occasional   • Drug use: No   • Sexual activity: Not Currently     Partners: Male     Birth control/ protection: Condom     Other Topics Concern   • Not on file     Social History Narrative     Family History   Problem Relation Age of Onset   • Breast cancer Mother 70   • Hearing loss Mother    • Hyperlipidemia Mother    • Diabetes Father    • Asthma Sister    • Clotting disorder Sister    • COPD Sister    • Pulmonary embolism Sister    • Deep vein thrombosis Brother    • Pulmonary embolism Brother    • Malig Hyperthermia Neg Hx         Review of Systems   Constitutional: Positive for fatigue. Negative for activity change, appetite change, fever and unexpected weight change.   HENT: Negative for congestion, mouth sores, nosebleeds, sore throat and voice change.    Respiratory: Negative for cough, shortness of breath and wheezing.    Cardiovascular: Negative for chest pain, palpitations and leg swelling.   Gastrointestinal: Positive for constipation. Negative for abdominal distention, abdominal pain, blood in stool, diarrhea, nausea and vomiting.   Endocrine: Positive for heat intolerance. Negative for cold intolerance.    Genitourinary: Positive for pelvic pain. Negative for difficulty urinating, dysuria, frequency and hematuria.   Musculoskeletal: Negative for arthralgias, back pain, joint swelling and myalgias.   Skin: Positive for wound. Negative for rash.   Neurological: Negative for dizziness, syncope, weakness, light-headedness, numbness and headaches.   Hematological: Negative for adenopathy. Does not bruise/bleed easily.   Psychiatric/Behavioral: Negative for confusion and sleep disturbance. The patient is not nervous/anxious.          Objective      Vitals:    10/10/17 0929   BP: 122/80   Pulse: 72   Resp: 16   Temp: 97.9 °F (36.6 °C)      Current Status 10/10/2017   ECOG score 0   Pain 0/10    Physical Exam   Constitutional: She is oriented to person, place, and time. She appears well-developed and well-nourished.   HENT:   Mouth/Throat: Oropharynx is clear and moist.   Eyes: Conjunctivae are normal.   Neck: No thyromegaly present.   Cardiovascular: Normal rate and regular rhythm.  Exam reveals no gallop and no friction rub.    No murmur heard.  Pulmonary/Chest: Breath sounds normal. No respiratory distress.   Right mastectomy incision has healed with no residual erythema noted.  There is a slight fullness/edema noted in the lateral chest wall/axillary region on the right..   Abdominal: Soft. Bowel sounds are normal. She exhibits no distension. There is no tenderness.   Postoperative laparoscopic incisions healing well.   Musculoskeletal: She exhibits no edema.   Lymphadenopathy:        Head (right side): No submandibular adenopathy present.     She has no cervical adenopathy.     She has no axillary adenopathy.        Right: No inguinal and no supraclavicular adenopathy present.        Left: No inguinal and no supraclavicular adenopathy present.   Neurological: She is alert and oriented to person, place, and time. She displays normal reflexes. No cranial nerve deficit. She exhibits normal muscle tone.   Skin: Skin is  warm and dry. No rash noted.   Psychiatric: She has a normal mood and affect. Her behavior is normal.       RECENT LABS:  Hematology WBC   Date Value Ref Range Status   10/10/2017 7.28 4.00 - 10.00 10*3/mm3 Final     RBC   Date Value Ref Range Status   10/10/2017 4.82 3.90 - 5.00 10*6/mm3 Final     Hemoglobin   Date Value Ref Range Status   10/10/2017 13.9 11.5 - 14.9 g/dL Final     Hematocrit   Date Value Ref Range Status   10/10/2017 43.1 34.0 - 45.0 % Final     Platelets   Date Value Ref Range Status   10/10/2017 233 150 - 375 10*3/mm3 Final        Lab Results   Component Value Date    GLUCOSE 133 (H) 10/10/2017    BUN 18 10/10/2017    CREATININE 0.71 10/10/2017    EGFRIFNONA 87 10/10/2017    BCR 25.4 10/10/2017    K 4.4 10/10/2017    CO2 25.8 10/10/2017    CALCIUM 9.2 10/10/2017    ALBUMIN 4.00 10/10/2017    LABIL2 1.3 10/10/2017    AST 16 10/10/2017    ALT 15 10/10/2017         Assessment/Plan      1. Pathologic stage IA ([id number]) right breast cancer: The patient had a mammographically detected nonpalpable abnormality of the right breast measuring 1.3 cm by ultrasound in the 10 o'clock position with biopsy 7/3/17 showing an invasive lobular carcinoma largest size measuring 9 mm, grade 1, ER positive (95%), CO positive (95%), HER-2/tanya 0 (IHC).  There were foci of associated LCIS.  There was no lymphatic nor perineural invasion.  Oncotype DX testing was sent revealing a recurrent score in a low risk range at 12 (10 year recurrence risk of 8% with treatment with tamoxifen, 1% benefit from adjuvant chemotherapy) and confirmed ER positive, CO positive, HER-2/tanya negative.  MRI of the breasts 7/13/17 showed the biopsy-proven right breast malignancy measuring 1.9 cm with no axillary lymph node enlargement.  There was however in the middle third of the right breast a cluster of microcalcifications that appear to have increased over time.  An MRI guided biopsy of this area was performed 7/25/17 in the 9:30 position  revealing an invasive lobular carcinoma, 4 mm, grade 2, ER positive (greater than 90%), OH positive (90%), HER-2/tanya 1+ recurrent sees IHC) with associated LCIS.  The patient was seen in the genetics clinic and underwent Gene DX panel testing 7/19/17 which was negative.  She has a family history of malignancy with breast cancer in her mother at age 70.  The patient saw Dr. Kinsey who discussed surgical management of her disease.  With evidence of multifocal involvement of the breast, it was recommended to undergo a right mastectomy with sentinel lymph node biopsy.  She did see Dr. Waterhouse as well in regards to reconstruction.    The patient was seen in initial consultation on 8/2/17. Oncotype DX score was in the low risk category at 12 indicating a 10 year recurrence risk of distant metastasis of 8% with tamoxifen alone.  We discussed that adjuvant chemotherapy would provide only approximately a 1% additional benefit.  It was not recommended for her to receive adjuvant chemotherapy.      Right mastectomy with implant reconstruction and sentinel lymph node biopsy performed on 8/23/17.  Pathology showed a 1.8 cm grade 2 invasive lobular carcinoma with negative margins (greater than 1 cm), extensive associated LCIS and non-atypical ductal hyperplasia, sentinel lymph node negative ×2 with evidence of multiple noncaseating granulomas.  Therefore, the patient had Stage IA ([id number]) disease.  Again, it was not recommended for her to receive adjuvant chemotherapy given her low risk Oncotype score.  This would provide only an additional 1% benefit.  We discussed pursuing adjuvant endocrine therapy.  As she was premenopausal, this would typically be undertaken with tamoxifen.  There was concern however given the patient's significant family history of thrombosis occurring at a young age with no identifiable/traceable risk factor.  Tamoxifen would confer a significant additional risk for her although she has not  experienced a personal history of thrombosis.  Also, we discussed that there is suggestion from subgroup analysis of the BIG 1-98 study to suggest a significant additional benefit of aromatase inhibitor therapy over tamoxifen for invasive lobular cancer.  Therefore, the patient underwent bilateral salpingo-oophorectomy 9/26/17 (pathology benign) producing surgical postmenopausal state.    The patient returns today in follow-up postoperatively.  She is experiencing some postmenopausal hot flashes which have been unpleasant and have been awakening her at night.  She does have a DEXA scan to review which indicates mild osteopenia 9/21/17 with a T score in the left femoral neck -1.2.  We will proceed with aromatase inhibitor adjuvant therapy using Arimidex 1 mg daily.  I have suggested initiation of Effexor XR 37.5 mg daily at the same time for hot flashes which may worsen with Arimidex.  Discussed side effects of the medication today including potential musculoskeletal complaints.  She will return in 1 month for follow-up visit and thereafter we will plan 3 month follow-up for the first year, 4 month follow-up in year 2 and then 6 month follow-up through year 5.    2. Family history of thrombosis: The patient has no personal history of thrombosis.  She has a significant family history of thrombosis however with a brother who developed a pulmonary embolism in his 30s, a brother developed a DVT at age 44, and a sister with a pulmonary embolism at age 55, all unprovoked.  The patient herself did undergo a hypercoagulable evaluation performed by her primary care physician [facility] 9/24/15 which showed negative prothrombin gene mutation analysis, negative factor V Leiden gene mutation analysis, negative lupus anticoagulant, negative anticardiolipin antibodies, normal homocystine, protein C activity 168%, protein S free 107%.   we did perform additional hypercoagulable labs from 8/3/17 showing protein S free  antigen 109%, protein S total antigen 125%, protein S activity 83%, antithrombin III activity 96%, beta-2 GP 1 antibody panel negative.  Therefore, the patient has no detectable underlying hypercoagulable state.  None of her family members however had a detectable risk factor either.  This does produce some uncertainty in regards to her risk with tamoxifen.  Therefore was elected to proceed with aromatase inhibitor therapy instead as she is surgically postmenopausal now.  She does continue on aspirin 81 mg a day which is certainly reasonable for now.    3. Osteopenia: The patient had a baseline DEXA scan from 9/21/17 showing mild osteopenia, maximal T score -1.2 and left femoral neck.  She does receive some dietary calcium supplementation in her multivitamin and does take a vitamin D supplement over-the-counter.  We will check a vitamin D level today.  We will plan to pursue follow-up DEXA scan at a 2 year interval.  4. Postmenopausal hot flashes: The patient is experiencing these postoperatively from her bilateral salpingo-oophorectomy.  We are also initiating aromatase inhibitor therapy.  Therefore I have recommended to begin Effexor XR 37.5 mg daily.      Plan:    1. Begin adjuvant Arimidex 1 mg daily  2. Begin Effexor XR 37.5 mg daily for hot flashes  3. 25 hydroxy vitamin D level pending today.  The patient does continue on over-the-counter vitamin D supplementation.   4. Continue aspirin 81 mg daily  5. M.D. visit in one month with CBC, CMP.

## 2017-10-13 ENCOUNTER — TELEPHONE (OUTPATIENT)
Dept: SURGERY | Facility: CLINIC | Age: 52
End: 2017-10-13

## 2017-10-16 ENCOUNTER — TELEPHONE (OUTPATIENT)
Dept: ONCOLOGY | Facility: HOSPITAL | Age: 52
End: 2017-10-16

## 2017-10-16 NOTE — TELEPHONE ENCOUNTER
Attempted to call patient. No answer. LVM    ----- Message from Zhou Gallegos Jr., MD sent at 10/16/2017 11:01 AM EDT -----  Regarding: vitamin d  Please notify patient that vitamin D level was very low and need her to be taking 2000 units per day.  ----- Message -----     From: Lab, Background User     Sent: 10/11/2017  12:51 PM       To: Zhou Gallegos Jr., MD

## 2017-10-16 NOTE — TELEPHONE ENCOUNTER
----- Message from Zhou Gallegos Jr., MD sent at 10/16/2017 11:01 AM EDT -----  Regarding: vitamin d  Please notify patient that vitamin D level was very low and need her to be taking 2000 units per day.  ----- Message -----     From: Lab, Background User     Sent: 10/11/2017  12:51 PM       To: Zhou Gallegos Jr., MD        Notified pt of vit d level. Will get otc vit d and start taking. No questions or concerns noted.

## 2017-10-17 NOTE — TELEPHONE ENCOUNTER
We can do her colonoscopy when she turns 52.  And we need to check with her plastic surgeon whether they would like for me to do the colonoscopy before her reconstruction and implant placement or after and if so how long after do we need to wait.

## 2017-10-17 NOTE — TELEPHONE ENCOUNTER
Patient is schedule for a procedure on 12/15/17. Does this need to be scheduled a certain amount of time before or after this? Also does she need to wait until after she turns 52 on 11/06/17 to schedule?

## 2017-10-18 ENCOUNTER — TELEPHONE (OUTPATIENT)
Dept: ONCOLOGY | Facility: HOSPITAL | Age: 52
End: 2017-10-18

## 2017-10-18 NOTE — TELEPHONE ENCOUNTER
Per Dr. Gallegos, there is an interaction between pt's Sumatriptan and Venlafaxine. We were to call her to see how frequently she is taking this sumatriptan. Attempted to call pt. No answer. Left message for her to return our call.

## 2017-10-19 ENCOUNTER — TELEPHONE (OUTPATIENT)
Dept: ONCOLOGY | Facility: HOSPITAL | Age: 52
End: 2017-10-19

## 2017-10-19 NOTE — TELEPHONE ENCOUNTER
Spoke with pt regarding how often she takes her imitrex due to the interaction with her effexor. Pt states she only took that with period related migraines and she doesn't have periods anymore so she doesn't take it at all. No further intervention needed then.

## 2017-10-24 ENCOUNTER — PREP FOR SURGERY (OUTPATIENT)
Dept: OTHER | Facility: HOSPITAL | Age: 52
End: 2017-10-24

## 2017-10-24 DIAGNOSIS — Z12.11 COLON CANCER SCREENING: ICD-10-CM

## 2017-10-24 DIAGNOSIS — Z80.0 FAMILY HISTORY OF COLON CANCER REQUIRING SCREENING COLONOSCOPY: Primary | ICD-10-CM

## 2017-10-24 NOTE — TELEPHONE ENCOUNTER
Patient checked with her doctor and is okay to have procedure prior to surgery. Patient has been scheduled on 11/07/17 to arrive at 12:00. Instructions have been emailed to them.

## 2017-11-06 ENCOUNTER — ANESTHESIA EVENT (OUTPATIENT)
Dept: PERIOP | Facility: HOSPITAL | Age: 52
End: 2017-11-06

## 2017-11-07 ENCOUNTER — HOSPITAL ENCOUNTER (OUTPATIENT)
Facility: HOSPITAL | Age: 52
Setting detail: HOSPITAL OUTPATIENT SURGERY
Discharge: HOME | End: 2017-11-07
Attending: SURGERY | Admitting: SURGERY

## 2017-11-07 ENCOUNTER — ANESTHESIA (OUTPATIENT)
Dept: PERIOP | Facility: HOSPITAL | Age: 52
End: 2017-11-07

## 2017-11-07 VITALS
OXYGEN SATURATION: 96 % | TEMPERATURE: 97.4 F | BODY MASS INDEX: 36.37 KG/M2 | WEIGHT: 191.4 LBS | RESPIRATION RATE: 18 BRPM | SYSTOLIC BLOOD PRESSURE: 110 MMHG | DIASTOLIC BLOOD PRESSURE: 65 MMHG | HEART RATE: 74 BPM

## 2017-11-07 DIAGNOSIS — Z12.11 COLON CANCER SCREENING: ICD-10-CM

## 2017-11-07 DIAGNOSIS — Z80.0 FAMILY HISTORY OF COLON CANCER REQUIRING SCREENING COLONOSCOPY: ICD-10-CM

## 2017-11-07 PROCEDURE — 45385 COLONOSCOPY W/LESION REMOVAL: CPT | Performed by: SURGERY

## 2017-11-07 PROCEDURE — S0260 H&P FOR SURGERY: HCPCS | Performed by: SURGERY

## 2017-11-07 PROCEDURE — 45380 COLONOSCOPY AND BIOPSY: CPT | Performed by: SURGERY

## 2017-11-07 PROCEDURE — 25010000002 PROPOFOL 10 MG/ML EMULSION: Performed by: NURSE ANESTHETIST, CERTIFIED REGISTERED

## 2017-11-07 RX ORDER — LIDOCAINE HYDROCHLORIDE 10 MG/ML
INJECTION, SOLUTION INFILTRATION; PERINEURAL AS NEEDED
Status: DISCONTINUED | OUTPATIENT
Start: 2017-11-07 | End: 2017-11-07 | Stop reason: SURG

## 2017-11-07 RX ORDER — SODIUM CHLORIDE 9 MG/ML
40 INJECTION, SOLUTION INTRAVENOUS AS NEEDED
Status: DISCONTINUED | OUTPATIENT
Start: 2017-11-07 | End: 2017-11-07 | Stop reason: HOSPADM

## 2017-11-07 RX ORDER — SODIUM PHOSPHATE, DIBASIC AND SODIUM PHOSPHATE, MONOBASIC 7; 19 G/133ML; G/133ML
1 ENEMA RECTAL ONCE
Status: DISCONTINUED | OUTPATIENT
Start: 2017-11-07 | End: 2017-11-07 | Stop reason: HOSPADM

## 2017-11-07 RX ORDER — SODIUM CHLORIDE, SODIUM LACTATE, POTASSIUM CHLORIDE, CALCIUM CHLORIDE 600; 310; 30; 20 MG/100ML; MG/100ML; MG/100ML; MG/100ML
9 INJECTION, SOLUTION INTRAVENOUS CONTINUOUS
Status: DISCONTINUED | OUTPATIENT
Start: 2017-11-07 | End: 2017-11-07 | Stop reason: HOSPADM

## 2017-11-07 RX ORDER — PROPOFOL 10 MG/ML
VIAL (ML) INTRAVENOUS CONTINUOUS PRN
Status: DISCONTINUED | OUTPATIENT
Start: 2017-11-07 | End: 2017-11-07 | Stop reason: SURG

## 2017-11-07 RX ORDER — GLYCOPYRROLATE 0.2 MG/ML
INJECTION INTRAMUSCULAR; INTRAVENOUS AS NEEDED
Status: DISCONTINUED | OUTPATIENT
Start: 2017-11-07 | End: 2017-11-07 | Stop reason: SURG

## 2017-11-07 RX ORDER — LIDOCAINE HYDROCHLORIDE 10 MG/ML
INJECTION, SOLUTION EPIDURAL; INFILTRATION; INTRACAUDAL; PERINEURAL
Status: DISCONTINUED
Start: 2017-11-07 | End: 2017-11-07 | Stop reason: HOSPADM

## 2017-11-07 RX ORDER — MAGNESIUM HYDROXIDE 1200 MG/15ML
LIQUID ORAL AS NEEDED
Status: DISCONTINUED | OUTPATIENT
Start: 2017-11-07 | End: 2017-11-07 | Stop reason: HOSPADM

## 2017-11-07 RX ORDER — PROPOFOL 10 MG/ML
VIAL (ML) INTRAVENOUS AS NEEDED
Status: DISCONTINUED | OUTPATIENT
Start: 2017-11-07 | End: 2017-11-07 | Stop reason: SURG

## 2017-11-07 RX ORDER — LIDOCAINE HYDROCHLORIDE 10 MG/ML
0.5 INJECTION, SOLUTION EPIDURAL; INFILTRATION; INTRACAUDAL; PERINEURAL ONCE AS NEEDED
Status: DISCONTINUED | OUTPATIENT
Start: 2017-11-07 | End: 2017-11-07 | Stop reason: HOSPADM

## 2017-11-07 RX ORDER — SODIUM CHLORIDE 0.9 % (FLUSH) 0.9 %
1-10 SYRINGE (ML) INJECTION AS NEEDED
Status: DISCONTINUED | OUTPATIENT
Start: 2017-11-07 | End: 2017-11-07 | Stop reason: HOSPADM

## 2017-11-07 RX ADMIN — PROPOFOL 50 MG: 10 INJECTION, EMULSION INTRAVENOUS at 10:57

## 2017-11-07 RX ADMIN — GLYCOPYRROLATE 0.1 MG: 0.2 INJECTION INTRAMUSCULAR; INTRAVENOUS at 10:55

## 2017-11-07 RX ADMIN — PROPOFOL 100 MCG/KG/MIN: 10 INJECTION, EMULSION INTRAVENOUS at 10:57

## 2017-11-07 RX ADMIN — PROPOFOL 50 MG: 10 INJECTION, EMULSION INTRAVENOUS at 11:01

## 2017-11-07 RX ADMIN — PROPOFOL 50 MG: 10 INJECTION, EMULSION INTRAVENOUS at 11:26

## 2017-11-07 RX ADMIN — PROPOFOL 50 MG: 10 INJECTION, EMULSION INTRAVENOUS at 11:20

## 2017-11-07 RX ADMIN — LIDOCAINE HYDROCHLORIDE 50 MG: 10 INJECTION, SOLUTION INFILTRATION; PERINEURAL at 10:54

## 2017-11-07 RX ADMIN — SODIUM CHLORIDE, POTASSIUM CHLORIDE, SODIUM LACTATE AND CALCIUM CHLORIDE: 600; 310; 30; 20 INJECTION, SOLUTION INTRAVENOUS at 10:53

## 2017-11-07 RX ADMIN — PROPOFOL 50 MG: 10 INJECTION, EMULSION INTRAVENOUS at 11:35

## 2017-11-07 RX ADMIN — PROPOFOL 50 MG: 10 INJECTION, EMULSION INTRAVENOUS at 11:14

## 2017-11-07 RX ADMIN — PROPOFOL 50 MG: 10 INJECTION, EMULSION INTRAVENOUS at 11:06

## 2017-11-07 NOTE — ANESTHESIA POSTPROCEDURE EVALUATION
Patient: Jessica Suazo    Procedure Summary     Date Anesthesia Start Anesthesia Stop Room / Location    11/07/17 1053 1143 [facility] / [facility]       Procedure Diagnosis Surgeon Provider    COLONOSCOPY, polypectomy (N/A ) Colon polyps; Internal hemorrhoids  (Colon cancer screening [Z12.11]; Family history of colon cancer requiring screening colonoscopy [Z80.0]) MD Marco Fajardo CRNA          Anesthesia Type: MAC  Last vitals  BP   110/71 (11/07/17 1200)   Temp   97.4 °F (36.3 °C) (11/07/17 1155)   Pulse   78 (11/07/17 1200)   Resp   19 (11/07/17 1200)     SpO2   96 % (11/07/17 1200)     Post Anesthesia Care and Evaluation    Patient location during evaluation: bedside  Patient participation: complete - patient participated  Level of consciousness: awake and alert  Pain score: 0  Pain management: adequate  Airway patency: patent  Anesthetic complications: No anesthetic complications    Cardiovascular status: acceptable  Respiratory status: acceptable  Hydration status: acceptable

## 2017-11-07 NOTE — OP NOTE
Colonoscopy Procedure Note  Date of procedure: 11/7/17    Pre-operative Diagnosis:  Colon cancer screening in patient with personal history of breast cancer, family history of colon cancer and colon polyps    Post-operative Diagnosis: Ascending colon polyp ×1                       Descending colon polyp ×1            Internal hemorrhoids            Very poor prep    Procedure: Colonoscopy with polypectomy    Surgeon: Lalita Dubose M.D.    Anesthetic: MAC per Zhou Maldonado CRNA    EBL : Minimal    Complications : None    Indications:  Patient is a 52 year old female referred to general surgery for colon cancer screening. She has never had a colonoscopy before. She denies any abdominal, pelvic pain, alternating bowel movements, melena, hematochezia or weight changes.  She has a personal history of breast cancer. She has a family history of colon cancer and colon polyps.  She was advised to undergo screening colonoscopy.  Procedure, risks, benefits, complications including but not limited to risk of bleeding, perforation, post-polypectomy syndrome as well as anesthetic complications were thoroughly discussed with the patient understood and gave informed consent.    Findings/Treatments: Colon preparation was very poor. I had to copiously lavage and irrigate and suction, this could certainly increase the likelihood of missing a diminutive lesion.  Sessile polyp noted in ascending colon 3 mm completely resected and removed with cold biopsy forceps. Sessile 5 mm polyp noted and descending colon removed with snare polypectomy.  Nonbleeding internal hemorrhoids.     Scope Withdrawal Time:  Greater than 6 minutes      Recommendations:    -Await pathology., -If adenoma is present, repeat colonoscopy in 1-3 years but we'll make final recommendations once pathology is available, she would also need a 2 day prep., -High fiber diet.    Procedure Details     After discussing the benefits and risks of the procedure with the patient, not  limited to but including:  Bleeding, infection, perforation, aspiration; informed consent was signed.  The patient was taken into the endoscopy room at [facility] and placed in the left lateral decubitus position.  MAC anesthesia was given with appropriate cardiopulmonary monitoring.  A rectal exam was performed.  No rectal masses no external hemorrhoids. Sphincter tone was normal.  The colonoscope was then inserted and carefully advanced to the cecum while visualizing the mucosa.  Colon preparation was very poor-I had to copiously irrigate and lavage with tap water, there was a lot of thick solid stool in the right colon, this could certainly increase the likelihood of missing a diminutive lesion.  The cecum was identified by the anatomic landmarks i.e. the cecal strap, ileocecal valve and the orifice of the appendix.  The scope was then gradually withdrawn carefully evaluating the colon mucosa in a circumferential fashion with findings as follows: Cecum, ileocecal valve, appendiceal orifice no gross mucosal lesions or polyps noted.  Scope was brought back into the ascending colon where a sessile polyp 3 mm noted completely resected and removed using cold biopsy forceps, EBL minimal and hemostasis assured.  Scope was then brought back into the remaining ascending colon withdrawn through hepatic flexure, transverse colon, splenic flexure and into the descending colon.  In descending colon a 5 mm sessile polyp was noted near polypectomy performed with hot snares polyp completely resected and retrieved, EBL minimal and hemostasis assured.  Scope was then brought back into the sigmoid colon and withdrawn through sigmoid into the rectum and distal rectum retroflex examination performed with evidence of nonbleeding internal hemorrhoids.  Scope was then straightened out and withdrawn from the patient while desufflating the colon.    Patient was awakened, his anesthesia was reversed and he was taken to  recovery room in stable condition having tolerated his procedure well with no immediate apparent complication.    Lalita Dubose MD

## 2017-11-07 NOTE — ANESTHESIA PREPROCEDURE EVALUATION
Anesthesia Evaluation     Patient summary reviewed   no history of anesthetic complications:  NPO Solid Status: > 8 hours  NPO Liquid Status: > 8 hours     Airway   Mallampati: II  TM distance: >3 FB  Neck ROM: full  no difficulty expected  Dental - normal exam     Pulmonary - normal exam    breath sounds clear to auscultation  (+) asthma (mild, 2x/week),   Cardiovascular - normal exam  Exercise tolerance: good (4-7 METS)    Rhythm: regular  Rate: normal    (+) hypertension (no meds needed) well controlled,       Neuro/Psych- negative ROS  GI/Hepatic/Renal/Endo    (+)  GERD (took prevacid last night, takes occas) well controlled,     Musculoskeletal (-) negative ROS    Abdominal  - normal exam   Substance History - negative use     OB/GYN          Other                                        Anesthesia Plan    ASA 2     MAC     intravenous induction   Anesthetic plan and risks discussed with patient.

## 2017-11-07 NOTE — PLAN OF CARE
Problem: Patient Care Overview (Adult)  Goal: Adult Individualization and Mutuality  Outcome: Ongoing (interventions implemented as appropriate)    11/07/17 0978   Individualization   Patient Specific Preferences none

## 2017-11-07 NOTE — PLAN OF CARE
Problem: Patient Care Overview (Adult)  Goal: Plan of Care Review  Outcome: Ongoing (interventions implemented as appropriate)    11/07/17 0962   Coping/Psychosocial Response Interventions   Plan Of Care Reviewed With patient   Patient Care Overview   Progress no change   Outcome Evaluation   Outcome Summary/Follow up Plan vss, waiting for procedure

## 2017-11-07 NOTE — DISCHARGE INSTRUCTIONS
OKAY TO RESUME YOUR ASPIRIN IN 5 DAYS.  DO NOT RESUME ANY OTHER BLOOD THINNERS SUCH AS ADVIL, ALEVE, MOTRIN, IBUPROFEN OR FISH OIL FOR 10 DAYS.

## 2017-11-07 NOTE — PLAN OF CARE
Problem: Patient Care Overview (Adult)  Goal: Plan of Care Review  Outcome: Outcome(s) achieved Date Met:  11/07/17 11/07/17 1206   Coping/Psychosocial Response Interventions   Plan Of Care Reviewed With patient;spouse   Patient Care Overview   Progress improving   Outcome Evaluation   Outcome Summary/Follow up Plan VSS, NO C/O, PASSING GAS AND TAKING PO, READY FOR D/C HOME       Goal: Adult Individualization and Mutuality  Outcome: Outcome(s) achieved Date Met:  11/07/17    Problem: GI Endoscopy (Adult)  Goal: Signs and Symptoms of Listed Potential Problems Will be Absent or Manageable (GI Endoscopy)  Outcome: Ongoing (interventions implemented as appropriate)

## 2017-11-07 NOTE — H&P
General Surgery      Patient Care Team:  LYNN Phillips as PCP - General (Nurse Practitioner)  Dee Morales MD as Consulting Physician (Obstetrics and Gynecology)  Dyan Kinsey MD as Referring Physician (Breast Surgery)  Zhou Gallegos Jr., MD as Consulting Physician (Hematology and Oncology)  Maurine Waterhouse, MD as Consulting Physician (Plastic Surgery)    CHIEF COMPLAINT: Colon cancer screening in patient with personal history of breast cancer, family history of colon polyps and colon cancer    HISTORY OF PRESENT ILLNESS: Patient is a very pleasant 52-year-old female who was referred to general surgery with the aforementioned complaints.  She denies any abdominal or pelvic pain, alternating diarrhea constipation, melena, hematochezia, unintentional weight loss or unexpected weight gain.  She was recently diagnosed with right breast cancer lobular carcinoma and is status post mastectomy and undergoing reconstruction.  She does have a family history of colon cancer in her maternal grandfather diagnosed in his 50s and colon polyps in multiple family members.      Past Medical History:   Diagnosis Date   • Anxiety    • Asthma    • Breast cancer    • Cancer 2017    Right   • GERD (gastroesophageal reflux disease) 2017   • Herpes     + HSv no outbreaks   • Hypertension     TAKES NO MEDS, borderline in past   • Lichen sclerosus    • Lichen sclerosus et atrophicus 2017   • Migraine      Past Surgical History:   Procedure Laterality Date   • BREAST BIOPSY Right 2017   • BREAST RECONSTRUCTION Right 2017    Procedure: RT TISSUE EXPANDER W/ ALLODERM;  Surgeon: Maurine Waterhouse, MD;  Location: [facility] OR;  Service:    •  SECTION     • D&C HYSTEROSCOPY N/A 2017    Procedure: DILATATION AND CURETTAGE HYSTEROSCOPY;  Surgeon: Dee Morales MD;  Location: [facility] OR;  Service:    • DIAGNOSTIC LAPAROSCOPY Bilateral 2017    Procedure: DIAGNOSTIC  LAPAROSCOPY,laproscopic bilateral salpingoophorectomy ;  Surgeon: Dee Morales MD;  Location: [facility] OR;  Service:    • DILATATION AND CURETTAGE     • HAND SURGERY Right    • MASTECTOMY Right 8/23/2017    Procedure: right total mastectomy, right axillary sentinel lymph node biopsy x2 with immediate reconstruction of right breast with expander.;  Surgeon: Dyan Kinsey MD;  Location: [facility] OR;  Service:    • PELVIC LAPAROSCOPY      BSO     Family History   Problem Relation Age of Onset   • Breast cancer Mother 70   • Hearing loss Mother    • Hyperlipidemia Mother    • Diabetes Father    • Asthma Sister    • Clotting disorder Sister    • COPD Sister    • Pulmonary embolism Sister    • Deep vein thrombosis Brother    • Pulmonary embolism Brother    • Malig Hyperthermia Neg Hx      Social History   Substance Use Topics   • Smoking status: Former Smoker     Packs/day: 0.50     Years: 10.00     Types: Cigarettes     Quit date: 2007   • Smokeless tobacco: Never Used   • Alcohol use Yes      Comment: Occasional     Prescriptions Prior to Admission   Medication Sig Dispense Refill Last Dose   • anastrozole (ARIMIDEX) 1 MG tablet Take 1 tablet by mouth Daily for 30 days. 30 tablet 5 11/6/2017 at Unknown time   • clobetasol (TEMOVATE) 0.05 % ointment   0 Past Month at Unknown time   • fluticasone-salmeterol (ADVAIR) 100-50 MCG/DOSE DISKUS Inhale 1 puff As Needed (SOB OR WHEEZING).   11/6/2017 at Unknown time   • ketoconazole (NIZORAL) 2 % cream Apply 1 application topically Daily.   Past Week at Unknown time   • rosuvastatin (CRESTOR) 5 MG tablet Take 5 mg by mouth Every Night.   11/6/2017 at 2100   • venlafaxine XR (EFFEXOR XR) 37.5 MG 24 hr capsule Take 1 capsule by mouth Daily. 30 capsule 4 11/6/2017 at 2100   • aspirin 81 MG tablet Take 81 mg by mouth Daily.   10/31/2017   • gabapentin (NEURONTIN) 400 MG capsule Take 400 mg by mouth As Needed (ARM PAIN).  0 More than a month at Unknown time   •  ibuprofen (ADVIL,MOTRIN) 600 MG tablet Take 1 tablet by mouth Every 6 (Six) Hours As Needed for Mild Pain . 30 tablet 0 10/31/2017   • naproxen (NAPROSYN) 500 MG tablet   0 More than a month at Unknown time   • PARoxetine (PAXIL) 10 MG tablet Take 1 tablet by mouth Every Morning. 30 tablet 11 Unknown at Unknown time   • SUMAtriptan (IMITREX) 100 MG tablet Take 100 mg by mouth 1 (One) Time As Needed for Migraine.  0 More than a month at Unknown time     Allergies:  Sesame seed [sesame oil]    REVIEW OF SYSTEMS:  Please see the above history of present illness for pertinent positives and negatives.  The remainder of the patient's systems have been reviewed and are negative.     Vital Signs  Temp:  [97.6 °F (36.4 °C)] 97.6 °F (36.4 °C)  Heart Rate:  [90] 90  Resp:  [16] 16  BP: (124)/(56) 124/56    Flowsheet Rows         First Filed Value    Admission Height      Admission Weight  191 lb 6.4 oz (86.8 kg) Documented at 11/07/2017 0926           Physical Exam:  Physical Exam   Constitutional: Patient appears well-developed and well-nourished and in no acute distress   HEENT:   Head: Normocephalic and atraumatic.   Eyes:  Pupils are equal, round, and reactive to light.  Mouth and Throat: Patient has moist mucous membranes. Oropharynx is clear of any erythema or exudate.     Neck: Neck supple. No JVD present. No thyromegaly present. No lymphadenopathy present.  Cardiovascular: Regular rate, regular rhythm.  Pulmonary/Chest: Lungs are clear to auscultation bilaterally.  Abdominal: Soft, mildly obese, nondistended, nontender positive bowel sounds in all 4 quadrants   Musculoskeletal: Normal posture.  Extremities: No edema. Pulses are palpable in all 4 extremities.  Neurological: Patient is alert and oriented.  Psychological:   Mood and behavior appropriate.  Skin: Skin is warm and dry.     Results Review:    I reviewed the patient's new clinical results.          Lab Results (most recent)     None          Imaging Results  (most recent)     None            ECG/EMG Results (most recent)     None            Assessment/Plan     Colon cancer screening in patient with personal history of breast cancer and family history of colon cancer and colon polyps.  We have discussed proceeding with a screening colonoscopy.  Procedure, risks, benefits, complications including but not limited to risk of bleeding, post-polypectomy syndrome, perforation requiring emergent additional procedures as well as cardiopulmonary complications have thoroughly been discussed with the patient understands and gives informed consent.     I have discussed the patients findings and my recommendations with patient and and her .     Lalita Dubose MD  11/07/17  10:39 AM

## 2017-11-07 NOTE — PLAN OF CARE
Problem: GI Endoscopy (Adult)  Goal: Signs and Symptoms of Listed Potential Problems Will be Absent or Manageable (GI Endoscopy)  Outcome: Ongoing (interventions implemented as appropriate)    11/07/17 7800   GI Endoscopy   Problems Assessed (GI Endoscopy) all   Problems Present (GI Endoscopy) none

## 2017-11-08 ENCOUNTER — LAB (OUTPATIENT)
Dept: LAB | Facility: HOSPITAL | Age: 52
End: 2017-11-08

## 2017-11-08 ENCOUNTER — OFFICE VISIT (OUTPATIENT)
Dept: ONCOLOGY | Facility: CLINIC | Age: 52
End: 2017-11-08

## 2017-11-08 VITALS
OXYGEN SATURATION: 96 % | WEIGHT: 193.4 LBS | DIASTOLIC BLOOD PRESSURE: 98 MMHG | HEART RATE: 74 BPM | RESPIRATION RATE: 16 BRPM | SYSTOLIC BLOOD PRESSURE: 136 MMHG | HEIGHT: 61 IN | BODY MASS INDEX: 36.51 KG/M2 | TEMPERATURE: 97.5 F

## 2017-11-08 DIAGNOSIS — C50.411 MALIGNANT NEOPLASM OF UPPER-OUTER QUADRANT OF RIGHT BREAST IN FEMALE, ESTROGEN RECEPTOR POSITIVE (HCC): Primary | ICD-10-CM

## 2017-11-08 DIAGNOSIS — E55.9 VITAMIN D DEFICIENCY DISEASE: ICD-10-CM

## 2017-11-08 DIAGNOSIS — R23.2 HOT FLASHES: ICD-10-CM

## 2017-11-08 DIAGNOSIS — Z17.0 MALIGNANT NEOPLASM OF UPPER-OUTER QUADRANT OF RIGHT BREAST IN FEMALE, ESTROGEN RECEPTOR POSITIVE (HCC): Primary | ICD-10-CM

## 2017-11-08 DIAGNOSIS — Z82.49 FAMILY HISTORY OF DVT: ICD-10-CM

## 2017-11-08 LAB
ALBUMIN SERPL-MCNC: 4.2 G/DL (ref 3.5–5.2)
ALBUMIN/GLOB SERPL: 1.3 G/DL (ref 1.1–2.4)
ALP SERPL-CCNC: 89 U/L (ref 38–116)
ALT SERPL W P-5'-P-CCNC: 21 U/L (ref 0–33)
ANION GAP SERPL CALCULATED.3IONS-SCNC: 10.3 MMOL/L
AST SERPL-CCNC: 18 U/L (ref 0–32)
BASOPHILS # BLD AUTO: 0.07 10*3/MM3 (ref 0–0.1)
BASOPHILS NFR BLD AUTO: 1 % (ref 0–1.1)
BILIRUB SERPL-MCNC: 0.5 MG/DL (ref 0.1–1.2)
BUN BLD-MCNC: 11 MG/DL (ref 6–20)
BUN/CREAT SERPL: 15.1 (ref 7.3–30)
CALCIUM SPEC-SCNC: 9.4 MG/DL (ref 8.5–10.2)
CHLORIDE SERPL-SCNC: 106 MMOL/L (ref 98–107)
CO2 SERPL-SCNC: 25.7 MMOL/L (ref 22–29)
CREAT BLD-MCNC: 0.73 MG/DL (ref 0.6–1.1)
DEPRECATED RDW RBC AUTO: 42.6 FL (ref 37–49)
EOSINOPHIL # BLD AUTO: 0.14 10*3/MM3 (ref 0–0.36)
EOSINOPHIL NFR BLD AUTO: 2.1 % (ref 1–5)
ERYTHROCYTE [DISTWIDTH] IN BLOOD BY AUTOMATED COUNT: 12.9 % (ref 11.7–14.5)
GFR SERPL CREATININE-BSD FRML MDRD: 84 ML/MIN/1.73
GLOBULIN UR ELPH-MCNC: 3.2 GM/DL (ref 1.8–3.5)
GLUCOSE BLD-MCNC: 131 MG/DL (ref 74–124)
HCT VFR BLD AUTO: 44.6 % (ref 34–45)
HGB BLD-MCNC: 14.3 G/DL (ref 11.5–14.9)
HOLD SPECIMEN: NORMAL
IMM GRANULOCYTES # BLD: 0.03 10*3/MM3 (ref 0–0.03)
IMM GRANULOCYTES NFR BLD: 0.4 % (ref 0–0.5)
LYMPHOCYTES # BLD AUTO: 1.77 10*3/MM3 (ref 1–3.5)
LYMPHOCYTES NFR BLD AUTO: 26.1 % (ref 20–49)
MCH RBC QN AUTO: 28.9 PG (ref 27–33)
MCHC RBC AUTO-ENTMCNC: 32.1 G/DL (ref 32–35)
MCV RBC AUTO: 90.3 FL (ref 83–97)
MONOCYTES # BLD AUTO: 0.39 10*3/MM3 (ref 0.25–0.8)
MONOCYTES NFR BLD AUTO: 5.7 % (ref 4–12)
NEUTROPHILS # BLD AUTO: 4.39 10*3/MM3 (ref 1.5–7)
NEUTROPHILS NFR BLD AUTO: 64.7 % (ref 39–75)
NRBC BLD MANUAL-RTO: 0 /100 WBC (ref 0–0)
PLATELET # BLD AUTO: 268 10*3/MM3 (ref 150–375)
PMV BLD AUTO: 10 FL (ref 8.9–12.1)
POTASSIUM BLD-SCNC: 4.5 MMOL/L (ref 3.5–4.7)
PROT SERPL-MCNC: 7.4 G/DL (ref 6.3–8)
RBC # BLD AUTO: 4.94 10*6/MM3 (ref 3.9–5)
SODIUM BLD-SCNC: 142 MMOL/L (ref 134–145)
WBC NRBC COR # BLD: 6.79 10*3/MM3 (ref 4–10)

## 2017-11-08 PROCEDURE — 85025 COMPLETE CBC W/AUTO DIFF WBC: CPT | Performed by: INTERNAL MEDICINE

## 2017-11-08 PROCEDURE — 99214 OFFICE O/P EST MOD 30 MIN: CPT | Performed by: INTERNAL MEDICINE

## 2017-11-08 PROCEDURE — 80053 COMPREHEN METABOLIC PANEL: CPT | Performed by: INTERNAL MEDICINE

## 2017-11-08 PROCEDURE — 36415 COLL VENOUS BLD VENIPUNCTURE: CPT | Performed by: INTERNAL MEDICINE

## 2017-11-09 NOTE — PROGRESS NOTES
Subjective .     REASONS FOR FOLLOWUP:    1. Stage IA ([id number]) right breast cancer: 1.8 cm grade 2 invasive lobular carcinoma, ER positive (95%, MA positive (95%), HER-2/tanya negative (IHC 0), sentinel lymph node negative ×2.  Status post right mastectomy 8/23/17.  2. Genetics evaluation 7/19/17 with negative Gene DX panel test.  3. Strong family history of thrombosis with no detectable familial hypercoagulable factor.  No personal history of thrombosis.  Hypercoagulable evaluation negative 9/24/15, additional negative evaluation 8/3/17.  Continues chronically on aspirin 81 mg daily.  4. Status post bilateral salpingo-oophorectomy 9/26/17 achieving surgical postmenopausal state.  5. Osteopenia with Baseline DEXA scan 9/21/17 showing maximal T score -1.2 left femoral neck.  6. Vitamin D deficiency, continuing on vitamin D supplementation 2400 units daily  7. Initiation of adjuvant Arimidex 1 mg daily 10/10/17.  8. Postmenopausal hot flashes, initiated Effexor XR 37.5 mg daily 10/10/17.    HISTORY OF PRESENT ILLNESS:  The patient is a 52 y.o. year old female who is here for follow-up with the above-mentioned history.    History of Present Illness the patient returns today in follow-up after having initiated treatment with Arimidex and Effexor or 10/10/17.  She has overall tolerated treatment well.  With the addition of Effexor were, her hot flashes have decreased.  She does note some mild dry mouth.  She did undergo a screening colonoscopy (age 52) on 11/7/17 with 2 polyps removed, pathology pending.  She does note some mild postoperative discomfort with an improving area of fluid collection in the posterior axillary region.  She is due to see Dr. Waterhouse again in late November.      Past Medical History:   Diagnosis Date   • Anxiety    • Asthma    • Breast cancer    • Cancer 2017    Right   • GERD (gastroesophageal reflux disease) 6/23/2017   • Herpes     + HSv no outbreaks   • Hypertension     TAKES NO MEDS,  borderline in past   • Lichen sclerosus    • Lichen sclerosus et atrophicus 2017   • Migraine      Past Surgical History:   Procedure Laterality Date   • BREAST BIOPSY Right 2017   • BREAST RECONSTRUCTION Right 2017    Procedure: RT TISSUE EXPANDER W/ ALLODERM;  Surgeon: Maurine Waterhouse, MD;  Location: [facility];  Service:    •  SECTION     • COLONOSCOPY N/A 2017    Procedure: COLONOSCOPY, polypectomy;  Surgeon: Lalita Dubose MD;  Location: [facility];  Service:    • D&C HYSTEROSCOPY N/A 2017    Procedure: DILATATION AND CURETTAGE HYSTEROSCOPY;  Surgeon: Dee Morales MD;  Location: [facility] OR;  Service:    • DIAGNOSTIC LAPAROSCOPY Bilateral 2017    Procedure: DIAGNOSTIC LAPAROSCOPY,laproscopic bilateral salpingoophorectomy ;  Surgeon: Dee Morales MD;  Location: [facility];  Service:    • DILATATION AND CURETTAGE     • HAND SURGERY Right    • MASTECTOMY Right 2017    Procedure: right total mastectomy, right axillary sentinel lymph node biopsy x2 with immediate reconstruction of right breast with expander.;  Surgeon: Dyan Kinsey MD;  Location: [facility];  Service:    • PELVIC LAPAROSCOPY      BSO       ONCOLOGIC HISTORY:  (History from previous dates can be found in the separate document.)    Current Outpatient Prescriptions on File Prior to Visit   Medication Sig Dispense Refill   • anastrozole (ARIMIDEX) 1 MG tablet Take 1 tablet by mouth Daily for 30 days. 30 tablet 5   • aspirin 81 MG tablet Take 81 mg by mouth Daily.     • clobetasol (TEMOVATE) 0.05 % ointment   0   • fluticasone-salmeterol (ADVAIR) 100-50 MCG/DOSE DISKUS Inhale 1 puff As Needed (SOB OR WHEEZING).     • gabapentin (NEURONTIN) 400 MG capsule Take 400 mg by mouth As Needed (ARM PAIN).  0   • ketoconazole (NIZORAL) 2 % cream Apply 1 application topically Daily.     • PARoxetine (PAXIL) 10 MG tablet Take 1 tablet by mouth Every Morning. 30 tablet 11   • rosuvastatin  (CRESTOR) 5 MG tablet Take 5 mg by mouth Every Night.     • SUMAtriptan (IMITREX) 100 MG tablet Take 100 mg by mouth 1 (One) Time As Needed for Migraine.  0   • venlafaxine XR (EFFEXOR XR) 37.5 MG 24 hr capsule Take 1 capsule by mouth Daily. 30 capsule 4     No current facility-administered medications on file prior to visit.        ALLERGIES:     Allergies   Allergen Reactions   • Sesame Seed [Sesame Oil]      Sunflower seed and the food family of it       Social History     Social History   • Marital status:      Spouse name: N/A   • Number of children: 1   • Years of education: College     Occupational History   •  [employer]     Social History Main Topics   • Smoking status: Former Smoker     Packs/day: 0.50     Years: 10.00     Types: Cigarettes     Quit date: 2007   • Smokeless tobacco: Never Used   • Alcohol use Yes      Comment: Occasional   • Drug use: No   • Sexual activity: Not Currently     Partners: Male     Birth control/ protection: Condom     Other Topics Concern   • Not on file     Social History Narrative     Family History   Problem Relation Age of Onset   • Breast cancer Mother 70   • Hearing loss Mother    • Hyperlipidemia Mother    • Diabetes Father    • Asthma Sister    • Clotting disorder Sister    • COPD Sister    • Pulmonary embolism Sister    • Deep vein thrombosis Brother    • Pulmonary embolism Brother    • Malig Hyperthermia Neg Hx         Review of Systems   Constitutional: Positive for fatigue. Negative for activity change, appetite change, fever and unexpected weight change.   HENT: Negative for congestion, mouth sores, nosebleeds, sore throat and voice change.    Respiratory: Negative for cough, shortness of breath and wheezing.    Cardiovascular: Negative for chest pain, palpitations and leg swelling.   Gastrointestinal: Negative for abdominal distention, abdominal pain, blood in stool, constipation, diarrhea, nausea and vomiting.   Endocrine: Negative  for cold intolerance and heat intolerance.   Genitourinary: Negative for difficulty urinating, dysuria, frequency, hematuria and pelvic pain.   Musculoskeletal: Negative for arthralgias, back pain, joint swelling and myalgias.   Skin: Negative for rash and wound.   Neurological: Negative for dizziness, syncope, weakness, light-headedness, numbness and headaches.   Hematological: Negative for adenopathy. Does not bruise/bleed easily.   Psychiatric/Behavioral: Negative for confusion and sleep disturbance. The patient is not nervous/anxious.          Objective      Vitals:    11/08/17 1147   BP: 136/98   Pulse: 74   Resp: 16   Temp: 97.5 °F (36.4 °C)   SpO2: 96%      Current Status 11/8/2017   ECOG score 0   Pain 0/10    Physical Exam   Constitutional: She is oriented to person, place, and time. She appears well-developed and well-nourished.   HENT:   Mouth/Throat: Oropharynx is clear and moist.   Eyes: Conjunctivae are normal.   Neck: No thyromegaly present.   Cardiovascular: Normal rate and regular rhythm.  Exam reveals no gallop and no friction rub.    No murmur heard.  Pulmonary/Chest: Breath sounds normal. No respiratory distress.   Right mastectomy with implant reconstruction.  There are no abnormalities palpated.  There was an area of postoperative edema in the posterior axillary region which is decreasing in size.  Left breast without masses no abnormalities palpated.   Abdominal: Soft. Bowel sounds are normal. She exhibits no distension. There is no tenderness.   Musculoskeletal: She exhibits no edema.   Lymphadenopathy:        Head (right side): No submandibular adenopathy present.     She has no cervical adenopathy.     She has no axillary adenopathy.        Right: No inguinal and no supraclavicular adenopathy present.        Left: No inguinal and no supraclavicular adenopathy present.   Neurological: She is alert and oriented to person, place, and time. She displays normal reflexes. No cranial nerve  deficit. She exhibits normal muscle tone.   Skin: Skin is warm and dry. No rash noted.   Psychiatric: She has a normal mood and affect. Her behavior is normal.       RECENT LABS:  Hematology WBC   Date Value Ref Range Status   11/08/2017 6.79 4.00 - 10.00 10*3/mm3 Final     RBC   Date Value Ref Range Status   11/08/2017 4.94 3.90 - 5.00 10*6/mm3 Final     Hemoglobin   Date Value Ref Range Status   11/08/2017 14.3 11.5 - 14.9 g/dL Final     Hematocrit   Date Value Ref Range Status   11/08/2017 44.6 34.0 - 45.0 % Final     Platelets   Date Value Ref Range Status   11/08/2017 268 150 - 375 10*3/mm3 Final        Lab Results   Component Value Date    GLUCOSE 131 (H) 11/08/2017    BUN 11 11/08/2017    CREATININE 0.73 11/08/2017    EGFRIFNONA 84 11/08/2017    BCR 15.1 11/08/2017    K 4.5 11/08/2017    CO2 25.7 11/08/2017    CALCIUM 9.4 11/08/2017    ALBUMIN 4.20 11/08/2017    LABIL2 1.3 11/08/2017    AST 18 11/08/2017    ALT 21 11/08/2017     25-hydroxy vitamin D level 10/10/17:17.8    Assessment/Plan      1. Pathologic stage IA ([id number]) right breast cancer: The patient had a mammographically detected nonpalpable abnormality of the right breast measuring 1.3 cm by ultrasound in the 10 o'clock position with biopsy 7/3/17 showing an invasive lobular carcinoma largest size measuring 9 mm, grade 1, ER positive (95%), KY positive (95%), HER-2/tanya 0 (IHC).  There were foci of associated LCIS.  There was no lymphatic nor perineural invasion.  Oncotype DX testing was sent revealing a recurrent score in a low risk range at 12 (10 year recurrence risk of 8% with treatment with tamoxifen, 1% benefit from adjuvant chemotherapy) and confirmed ER positive, KY positive, HER-2/tanya negative.  MRI of the breasts 7/13/17 showed the biopsy-proven right breast malignancy measuring 1.9 cm with no axillary lymph node enlargement.  There was however in the middle third of the right breast a cluster of microcalcifications that appear to have  increased over time.  An MRI guided biopsy of this area was performed 7/25/17 in the 9:30 position revealing an invasive lobular carcinoma, 4 mm, grade 2, ER positive (greater than 90%), MD positive (90%), HER-2/tanya 1+ recurrent sees IHC) with associated LCIS.  The patient was seen in the genetics clinic and underwent Gene DX panel testing 7/19/17 which was negative.  She has a family history of malignancy with breast cancer in her mother at age 70.  The patient saw Dr. Kinsey who discussed surgical management of her disease.  With evidence of multifocal involvement of the breast, it was recommended to undergo a right mastectomy with sentinel lymph node biopsy.  She did see Dr. Waterhouse as well in regards to reconstruction.    The patient was seen in initial consultation on 8/2/17. Oncotype DX score was in the low risk category at 12 indicating a 10 year recurrence risk of distant metastasis of 8% with tamoxifen alone.  We discussed that adjuvant chemotherapy would provide only approximately a 1% additional benefit.  It was not recommended for her to receive adjuvant chemotherapy.      Right mastectomy with implant reconstruction and sentinel lymph node biopsy performed on 8/23/17.  Pathology showed a 1.8 cm grade 2 invasive lobular carcinoma with negative margins (greater than 1 cm), extensive associated LCIS and non-atypical ductal hyperplasia, sentinel lymph node negative ×2 with evidence of multiple noncaseating granulomas.  Therefore, the patient had Stage IA ([id number]) disease.  Again, it was not recommended for her to receive adjuvant chemotherapy given her low risk Oncotype score.  This would provide only an additional 1% benefit.  We discussed pursuing adjuvant endocrine therapy.  As she was premenopausal, this would typically be undertaken with tamoxifen.  There was concern however given the patient's significant family history of thrombosis occurring at a young age with no identifiable/traceable risk  factor.  Tamoxifen would confer a significant additional risk for her although she has not experienced a personal history of thrombosis.  Also, we discussed that there is suggestion from subgroup analysis of the BIG 1-98 study to suggest a significant additional benefit of aromatase inhibitor therapy over tamoxifen for invasive lobular cancer.  Therefore, the patient underwent bilateral salpingo-oophorectomy 9/26/17 (pathology benign) producing surgical postmenopausal state. Baseline DEXA scan showed mild osteopenia 9/21/17 with a T score in the left femoral neck -1.2.  On 10/10/17, initiated Arimidex 1 mg daily and Effexor XRT 37.5 mg daily.    The patient returns today one month into her adjuvant endocrine therapy with Arimidex.  She is tolerating this well.  As we initiated Effexor at the same time, she has experienced a decrease in her hot flashes.  She has no clinical evidence or recurrent disease.  I will plan to see her back in follow-up in 3 months.  She will contact us in the interval with any new issues.  She is due to follow up with her plastic surgeon Dr. Waterhouse fairly soon.  2. Family history of thrombosis: The patient has no personal history of thrombosis.  She has a significant family history of thrombosis however with a brother who developed a pulmonary embolism in his 30s, a brother developed a DVT at age 44, and a sister with a pulmonary embolism at age 55, all unprovoked.  The patient herself did undergo a hypercoagulable evaluation performed by her primary care physician [facility] 9/24/15 which showed negative prothrombin gene mutation analysis, negative factor V Leiden gene mutation analysis, negative lupus anticoagulant, negative anticardiolipin antibodies, normal homocystine, protein C activity 168%, protein S free 107%.   we did perform additional hypercoagulable labs from 8/3/17 showing protein S free antigen 109%, protein S total antigen 125%, protein S activity 83%,  antithrombin III activity 96%, beta-2 GP 1 antibody panel negative.  Therefore, the patient has no detectable underlying hypercoagulable state.  None of her family members however had a detectable risk factor either.  This does produce some uncertainty in regards to her risk with tamoxifen.  Therefore was elected to proceed with aromatase inhibitor therapy instead as she is surgically postmenopausal now.  She does continue on aspirin 81 mg a day.    3. Osteopenia: The patient had a baseline DEXA scan from 9/21/17 showing mild osteopenia, maximal T score -1.2 and left femoral neck.  Anticipate a 2 year interval follow-up study on aromatase inhibitor therapy.  4. Vitamin D deficiency: Weight 5 hydroxy vitamin D level on 10/10/17 was very low at 17.8.  The patient was asked to increase vitamin D supplementation to 2400 units daily.  We will repeat her level when she returns again in 3 months.  5. Postmenopausal hot flashes: The patient was experiencing these postoperatively from her bilateral salpingo-oophorectomy.  Since we were initiating adjuvant aromatase inhibitor therapy on 10/10/17, it was elected to also currently begin Effexor XR 37.5 mg daily.  This has lead to an improvement in her hot flashes.  6. Health maintenance: The patient underwent a screening colonoscopy on 11/7/17 and she has now age 52.  2 polyps were removed with pathology pending.     Plan:    1. Continue adjuvant Arimidex 1 mg daily  2. Continue Effexor XR 37.5 mg daily for hot flashes  3. Continue aspirin 81 mg daily  4. Continue vitamin D supplementation 2400 units daily  5. M.D. visit in 3 months with CBC, CMP, and 25 hydroxy vitamin D level.

## 2017-11-10 LAB
LAB AP CASE REPORT: NORMAL
Lab: NORMAL
PATH REPORT.FINAL DX SPEC: NORMAL

## 2017-11-30 ENCOUNTER — APPOINTMENT (OUTPATIENT)
Dept: PREADMISSION TESTING | Facility: HOSPITAL | Age: 52
End: 2017-11-30

## 2017-11-30 VITALS
RESPIRATION RATE: 16 BRPM | SYSTOLIC BLOOD PRESSURE: 103 MMHG | BODY MASS INDEX: 35.5 KG/M2 | HEIGHT: 61 IN | OXYGEN SATURATION: 94 % | TEMPERATURE: 96.6 F | WEIGHT: 188 LBS | DIASTOLIC BLOOD PRESSURE: 68 MMHG | HEART RATE: 79 BPM

## 2017-11-30 LAB
ANION GAP SERPL CALCULATED.3IONS-SCNC: 13.2 MMOL/L
BUN BLD-MCNC: 14 MG/DL (ref 6–20)
BUN/CREAT SERPL: 18.7 (ref 7–25)
CALCIUM SPEC-SCNC: 9.7 MG/DL (ref 8.6–10.5)
CHLORIDE SERPL-SCNC: 101 MMOL/L (ref 98–107)
CO2 SERPL-SCNC: 26.8 MMOL/L (ref 22–29)
CREAT BLD-MCNC: 0.75 MG/DL (ref 0.57–1)
DEPRECATED RDW RBC AUTO: 42.8 FL (ref 37–54)
ERYTHROCYTE [DISTWIDTH] IN BLOOD BY AUTOMATED COUNT: 13.4 % (ref 11.7–13)
GFR SERPL CREATININE-BSD FRML MDRD: 82 ML/MIN/1.73
GLUCOSE BLD-MCNC: 130 MG/DL (ref 65–99)
HCT VFR BLD AUTO: 44.7 % (ref 35.6–45.5)
HGB BLD-MCNC: 15 G/DL (ref 11.9–15.5)
MCH RBC QN AUTO: 29.7 PG (ref 26.9–32)
MCHC RBC AUTO-ENTMCNC: 33.6 G/DL (ref 32.4–36.3)
MCV RBC AUTO: 88.5 FL (ref 80.5–98.2)
PLATELET # BLD AUTO: 275 10*3/MM3 (ref 140–500)
PMV BLD AUTO: 11 FL (ref 6–12)
POTASSIUM BLD-SCNC: 4.2 MMOL/L (ref 3.5–5.2)
RBC # BLD AUTO: 5.05 10*6/MM3 (ref 3.9–5.2)
SODIUM BLD-SCNC: 141 MMOL/L (ref 136–145)
WBC NRBC COR # BLD: 7.41 10*3/MM3 (ref 4.5–10.7)

## 2017-11-30 PROCEDURE — 80048 BASIC METABOLIC PNL TOTAL CA: CPT | Performed by: PLASTIC SURGERY

## 2017-11-30 PROCEDURE — 85027 COMPLETE CBC AUTOMATED: CPT | Performed by: PLASTIC SURGERY

## 2017-11-30 PROCEDURE — 36415 COLL VENOUS BLD VENIPUNCTURE: CPT

## 2017-12-15 ENCOUNTER — HOSPITAL ENCOUNTER (OUTPATIENT)
Facility: HOSPITAL | Age: 52
Setting detail: HOSPITAL OUTPATIENT SURGERY
Discharge: HOME | End: 2017-12-15
Attending: PLASTIC SURGERY | Admitting: PLASTIC SURGERY

## 2017-12-15 ENCOUNTER — ANESTHESIA (OUTPATIENT)
Dept: PERIOP | Facility: HOSPITAL | Age: 52
End: 2017-12-15

## 2017-12-15 ENCOUNTER — ANESTHESIA EVENT (OUTPATIENT)
Dept: PERIOP | Facility: HOSPITAL | Age: 52
End: 2017-12-15

## 2017-12-15 VITALS
RESPIRATION RATE: 16 BRPM | TEMPERATURE: 98.5 F | OXYGEN SATURATION: 95 % | SYSTOLIC BLOOD PRESSURE: 118 MMHG | BODY MASS INDEX: 37.63 KG/M2 | WEIGHT: 199.3 LBS | HEART RATE: 66 BPM | HEIGHT: 61 IN | DIASTOLIC BLOOD PRESSURE: 68 MMHG

## 2017-12-15 DIAGNOSIS — N62 MACROMASTIA: ICD-10-CM

## 2017-12-15 PROCEDURE — C1789 PROSTHESIS, BREAST, IMP: HCPCS | Performed by: PLASTIC SURGERY

## 2017-12-15 PROCEDURE — 25010000002 PROPOFOL 10 MG/ML EMULSION: Performed by: NURSE ANESTHETIST, CERTIFIED REGISTERED

## 2017-12-15 PROCEDURE — 25010000002 PHENYLEPHRINE PER 1 ML: Performed by: NURSE ANESTHETIST, CERTIFIED REGISTERED

## 2017-12-15 PROCEDURE — 25010000002 DEXAMETHASONE PER 1 MG: Performed by: NURSE ANESTHETIST, CERTIFIED REGISTERED

## 2017-12-15 PROCEDURE — 25010000002 MIDAZOLAM PER 1 MG: Performed by: ANESTHESIOLOGY

## 2017-12-15 PROCEDURE — 25010000002 ONDANSETRON PER 1 MG: Performed by: ANESTHESIOLOGY

## 2017-12-15 PROCEDURE — 25010000002 NEOSTIGMINE PER 0.5 MG: Performed by: ANESTHESIOLOGY

## 2017-12-15 PROCEDURE — 25010000002 FENTANYL CITRATE (PF) 100 MCG/2ML SOLUTION: Performed by: NURSE ANESTHETIST, CERTIFIED REGISTERED

## 2017-12-15 PROCEDURE — 88305 TISSUE EXAM BY PATHOLOGIST: CPT | Performed by: PLASTIC SURGERY

## 2017-12-15 PROCEDURE — 25010000003 CEFAZOLIN IN DEXTROSE 2-4 GM/100ML-% SOLUTION: Performed by: PLASTIC SURGERY

## 2017-12-15 DEVICE — SMOOTH ROUND HIGH PROFILE GEL-FILLED BREAST IMPLANT, 750CC  SMOOTH ROUND SILICONE
Type: IMPLANTABLE DEVICE | Site: BREAST | Status: FUNCTIONAL
Brand: MENTOR MEMORYGEL BREAST IMPLANT

## 2017-12-15 RX ORDER — SCOLOPAMINE TRANSDERMAL SYSTEM 1 MG/1
1 PATCH, EXTENDED RELEASE TRANSDERMAL ONCE
Status: DISCONTINUED | OUTPATIENT
Start: 2017-12-15 | End: 2017-12-15 | Stop reason: HOSPADM

## 2017-12-15 RX ORDER — OXYCODONE AND ACETAMINOPHEN 7.5; 325 MG/1; MG/1
1 TABLET ORAL ONCE AS NEEDED
Status: DISCONTINUED | OUTPATIENT
Start: 2017-12-15 | End: 2017-12-15 | Stop reason: HOSPADM

## 2017-12-15 RX ORDER — LIDOCAINE HYDROCHLORIDE 20 MG/ML
INJECTION, SOLUTION INFILTRATION; PERINEURAL AS NEEDED
Status: DISCONTINUED | OUTPATIENT
Start: 2017-12-15 | End: 2017-12-15 | Stop reason: SURG

## 2017-12-15 RX ORDER — PROMETHAZINE HYDROCHLORIDE 25 MG/1
25 SUPPOSITORY RECTAL ONCE AS NEEDED
Status: DISCONTINUED | OUTPATIENT
Start: 2017-12-15 | End: 2017-12-15 | Stop reason: HOSPADM

## 2017-12-15 RX ORDER — MIDAZOLAM HYDROCHLORIDE 1 MG/ML
1 INJECTION INTRAMUSCULAR; INTRAVENOUS
Status: DISCONTINUED | OUTPATIENT
Start: 2017-12-15 | End: 2017-12-15 | Stop reason: HOSPADM

## 2017-12-15 RX ORDER — BUPIVACAINE HYDROCHLORIDE AND EPINEPHRINE 2.5; 5 MG/ML; UG/ML
INJECTION, SOLUTION INFILTRATION; PERINEURAL AS NEEDED
Status: DISCONTINUED | OUTPATIENT
Start: 2017-12-15 | End: 2017-12-15 | Stop reason: HOSPADM

## 2017-12-15 RX ORDER — PROMETHAZINE HYDROCHLORIDE 25 MG/ML
12.5 INJECTION, SOLUTION INTRAMUSCULAR; INTRAVENOUS ONCE AS NEEDED
Status: DISCONTINUED | OUTPATIENT
Start: 2017-12-15 | End: 2017-12-15 | Stop reason: HOSPADM

## 2017-12-15 RX ORDER — ONDANSETRON 2 MG/ML
4 INJECTION INTRAMUSCULAR; INTRAVENOUS ONCE AS NEEDED
Status: DISCONTINUED | OUTPATIENT
Start: 2017-12-15 | End: 2017-12-15 | Stop reason: HOSPADM

## 2017-12-15 RX ORDER — FENTANYL CITRATE 50 UG/ML
50 INJECTION, SOLUTION INTRAMUSCULAR; INTRAVENOUS
Status: DISCONTINUED | OUTPATIENT
Start: 2017-12-15 | End: 2017-12-15 | Stop reason: HOSPADM

## 2017-12-15 RX ORDER — HYDROMORPHONE HYDROCHLORIDE 1 MG/ML
0.5 INJECTION, SOLUTION INTRAMUSCULAR; INTRAVENOUS; SUBCUTANEOUS
Status: DISCONTINUED | OUTPATIENT
Start: 2017-12-15 | End: 2017-12-15 | Stop reason: HOSPADM

## 2017-12-15 RX ORDER — PROPOFOL 10 MG/ML
VIAL (ML) INTRAVENOUS AS NEEDED
Status: DISCONTINUED | OUTPATIENT
Start: 2017-12-15 | End: 2017-12-15 | Stop reason: SURG

## 2017-12-15 RX ORDER — HYDRALAZINE HYDROCHLORIDE 20 MG/ML
5 INJECTION INTRAMUSCULAR; INTRAVENOUS
Status: DISCONTINUED | OUTPATIENT
Start: 2017-12-15 | End: 2017-12-15 | Stop reason: HOSPADM

## 2017-12-15 RX ORDER — EPHEDRINE SULFATE 50 MG/ML
5 INJECTION, SOLUTION INTRAVENOUS ONCE AS NEEDED
Status: DISCONTINUED | OUTPATIENT
Start: 2017-12-15 | End: 2017-12-15 | Stop reason: HOSPADM

## 2017-12-15 RX ORDER — LABETALOL HYDROCHLORIDE 5 MG/ML
5 INJECTION, SOLUTION INTRAVENOUS
Status: DISCONTINUED | OUTPATIENT
Start: 2017-12-15 | End: 2017-12-15 | Stop reason: HOSPADM

## 2017-12-15 RX ORDER — ROCURONIUM BROMIDE 10 MG/ML
INJECTION, SOLUTION INTRAVENOUS AS NEEDED
Status: DISCONTINUED | OUTPATIENT
Start: 2017-12-15 | End: 2017-12-15 | Stop reason: SURG

## 2017-12-15 RX ORDER — FLUMAZENIL 0.1 MG/ML
0.2 INJECTION INTRAVENOUS AS NEEDED
Status: DISCONTINUED | OUTPATIENT
Start: 2017-12-15 | End: 2017-12-15 | Stop reason: HOSPADM

## 2017-12-15 RX ORDER — FENTANYL CITRATE 50 UG/ML
INJECTION, SOLUTION INTRAMUSCULAR; INTRAVENOUS AS NEEDED
Status: DISCONTINUED | OUTPATIENT
Start: 2017-12-15 | End: 2017-12-15 | Stop reason: SURG

## 2017-12-15 RX ORDER — PROMETHAZINE HYDROCHLORIDE 25 MG/1
12.5 TABLET ORAL ONCE AS NEEDED
Status: DISCONTINUED | OUTPATIENT
Start: 2017-12-15 | End: 2017-12-15 | Stop reason: HOSPADM

## 2017-12-15 RX ORDER — CEFAZOLIN SODIUM 2 G/100ML
2 INJECTION, SOLUTION INTRAVENOUS ONCE
Status: COMPLETED | OUTPATIENT
Start: 2017-12-15 | End: 2017-12-15

## 2017-12-15 RX ORDER — DIPHENHYDRAMINE HYDROCHLORIDE 50 MG/ML
12.5 INJECTION INTRAMUSCULAR; INTRAVENOUS
Status: DISCONTINUED | OUTPATIENT
Start: 2017-12-15 | End: 2017-12-15 | Stop reason: HOSPADM

## 2017-12-15 RX ORDER — HYDROCODONE BITARTRATE AND ACETAMINOPHEN 7.5; 325 MG/1; MG/1
1 TABLET ORAL ONCE AS NEEDED
Status: DISCONTINUED | OUTPATIENT
Start: 2017-12-15 | End: 2017-12-15 | Stop reason: HOSPADM

## 2017-12-15 RX ORDER — ANASTROZOLE 1 MG/1
1 TABLET ORAL DAILY
COMMUNITY
End: 2018-05-02 | Stop reason: SDUPTHER

## 2017-12-15 RX ORDER — PROMETHAZINE HYDROCHLORIDE 25 MG/1
25 TABLET ORAL ONCE AS NEEDED
Status: DISCONTINUED | OUTPATIENT
Start: 2017-12-15 | End: 2017-12-15 | Stop reason: HOSPADM

## 2017-12-15 RX ORDER — ONDANSETRON 2 MG/ML
INJECTION INTRAMUSCULAR; INTRAVENOUS AS NEEDED
Status: DISCONTINUED | OUTPATIENT
Start: 2017-12-15 | End: 2017-12-15 | Stop reason: SURG

## 2017-12-15 RX ORDER — NALOXONE HCL 0.4 MG/ML
0.2 VIAL (ML) INJECTION AS NEEDED
Status: DISCONTINUED | OUTPATIENT
Start: 2017-12-15 | End: 2017-12-15 | Stop reason: HOSPADM

## 2017-12-15 RX ORDER — GLYCOPYRROLATE 0.2 MG/ML
INJECTION INTRAMUSCULAR; INTRAVENOUS AS NEEDED
Status: DISCONTINUED | OUTPATIENT
Start: 2017-12-15 | End: 2017-12-15 | Stop reason: SURG

## 2017-12-15 RX ORDER — FAMOTIDINE 10 MG/ML
20 INJECTION, SOLUTION INTRAVENOUS ONCE
Status: COMPLETED | OUTPATIENT
Start: 2017-12-15 | End: 2017-12-15

## 2017-12-15 RX ORDER — ROSUVASTATIN CALCIUM 10 MG/1
10 TABLET, COATED ORAL DAILY
COMMUNITY
End: 2018-05-02 | Stop reason: SDUPTHER

## 2017-12-15 RX ORDER — SODIUM CHLORIDE 0.9 % (FLUSH) 0.9 %
1-10 SYRINGE (ML) INJECTION AS NEEDED
Status: DISCONTINUED | OUTPATIENT
Start: 2017-12-15 | End: 2017-12-15 | Stop reason: HOSPADM

## 2017-12-15 RX ORDER — DEXAMETHASONE SODIUM PHOSPHATE 10 MG/ML
INJECTION INTRAMUSCULAR; INTRAVENOUS AS NEEDED
Status: DISCONTINUED | OUTPATIENT
Start: 2017-12-15 | End: 2017-12-15 | Stop reason: SURG

## 2017-12-15 RX ORDER — MIDAZOLAM HYDROCHLORIDE 1 MG/ML
2 INJECTION INTRAMUSCULAR; INTRAVENOUS
Status: DISCONTINUED | OUTPATIENT
Start: 2017-12-15 | End: 2017-12-15 | Stop reason: HOSPADM

## 2017-12-15 RX ORDER — SODIUM CHLORIDE, SODIUM LACTATE, POTASSIUM CHLORIDE, CALCIUM CHLORIDE 600; 310; 30; 20 MG/100ML; MG/100ML; MG/100ML; MG/100ML
9 INJECTION, SOLUTION INTRAVENOUS CONTINUOUS
Status: DISCONTINUED | OUTPATIENT
Start: 2017-12-15 | End: 2017-12-15 | Stop reason: HOSPADM

## 2017-12-15 RX ADMIN — NEOSTIGMINE METHYLSULFATE 3 MG: 1 INJECTION INTRAMUSCULAR; INTRAVENOUS; SUBCUTANEOUS at 16:44

## 2017-12-15 RX ADMIN — CEFAZOLIN SODIUM 2 G: 2 INJECTION, SOLUTION INTRAVENOUS at 14:19

## 2017-12-15 RX ADMIN — ROCURONIUM BROMIDE 20 MG: 10 INJECTION INTRAVENOUS at 15:30

## 2017-12-15 RX ADMIN — FENTANYL CITRATE 50 MCG: 50 INJECTION INTRAMUSCULAR; INTRAVENOUS at 16:51

## 2017-12-15 RX ADMIN — PHENYLEPHRINE HYDROCHLORIDE 200 MCG: 10 INJECTION INTRAVENOUS at 15:00

## 2017-12-15 RX ADMIN — FENTANYL CITRATE 100 MCG: 50 INJECTION INTRAMUSCULAR; INTRAVENOUS at 14:22

## 2017-12-15 RX ADMIN — PHENYLEPHRINE HYDROCHLORIDE 200 MCG: 10 INJECTION INTRAVENOUS at 14:46

## 2017-12-15 RX ADMIN — DEXAMETHASONE SODIUM PHOSPHATE 4 MG: 10 INJECTION INTRAMUSCULAR; INTRAVENOUS at 15:30

## 2017-12-15 RX ADMIN — ROCURONIUM BROMIDE 50 MG: 10 INJECTION INTRAVENOUS at 14:25

## 2017-12-15 RX ADMIN — PHENYLEPHRINE HYDROCHLORIDE 200 MCG: 10 INJECTION INTRAVENOUS at 15:25

## 2017-12-15 RX ADMIN — ONDANSETRON 4 MG: 2 INJECTION INTRAMUSCULAR; INTRAVENOUS at 16:35

## 2017-12-15 RX ADMIN — SCOPALAMINE 1 PATCH: 1 PATCH, EXTENDED RELEASE TRANSDERMAL at 11:44

## 2017-12-15 RX ADMIN — GLYCOPYRROLATE 0.3 MG: 0.2 INJECTION INTRAMUSCULAR; INTRAVENOUS at 16:44

## 2017-12-15 RX ADMIN — GLYCOPYRROLATE 0.2 MG: 0.2 INJECTION INTRAMUSCULAR; INTRAVENOUS at 15:30

## 2017-12-15 RX ADMIN — HYDROCODONE BITARTRATE AND ACETAMINOPHEN 1 TABLET: 7.5; 325 TABLET ORAL at 18:20

## 2017-12-15 RX ADMIN — FENTANYL CITRATE 50 MCG: 50 INJECTION INTRAMUSCULAR; INTRAVENOUS at 17:10

## 2017-12-15 RX ADMIN — PROPOFOL 150 MG: 10 INJECTION, EMULSION INTRAVENOUS at 14:25

## 2017-12-15 RX ADMIN — Medication 1 MG: at 13:23

## 2017-12-15 RX ADMIN — FAMOTIDINE 20 MG: 10 INJECTION, SOLUTION INTRAVENOUS at 12:03

## 2017-12-15 RX ADMIN — SODIUM CHLORIDE, POTASSIUM CHLORIDE, SODIUM LACTATE AND CALCIUM CHLORIDE 9 ML/HR: 600; 310; 30; 20 INJECTION, SOLUTION INTRAVENOUS at 12:02

## 2017-12-15 RX ADMIN — LIDOCAINE HYDROCHLORIDE 60 MG: 20 INJECTION, SOLUTION INFILTRATION; PERINEURAL at 14:25

## 2017-12-15 NOTE — H&P
Patient Care Team:  LYNN Phillips as PCP - General (Nurse Practitioner)  Dee Morales MD as Consulting Physician (Obstetrics and Gynecology)  Dyan Kinsey MD as Referring Physician (Breast Surgery)  Zhou Gallegos Jr., MD as Consulting Physician (Hematology and Oncology)  Maurine Waterhouse, MD as Consulting Physician (Plastic Surgery)    Chief complaint right breast tissue expander in place, with assymmetry due to large ptotic left breast  Subjective     Patient is a 52 y.o. female presents with request for next stage of reconstruction      Review of Systems   Pertinent items are noted in HPI, all other systems reviewed and negative    History  Past Medical History:   Diagnosis Date   • Anxiety    • Asthma    • Breast cancer    • Cancer 2017    Right   • GERD (gastroesophageal reflux disease) 2017   • Herpes     + HSv no outbreaks   • Lichen sclerosus    • Lichen sclerosus et atrophicus 2017   • Migraine    • Polyp of colon, adenomatous      Past Surgical History:   Procedure Laterality Date   • BREAST BIOPSY Right 2017   • BREAST RECONSTRUCTION Right 2017    Procedure: RT TISSUE EXPANDER W/ ALLODERM;  Surgeon: Maurine Waterhouse, MD;  Location: [facility] OR;  Service:    •  SECTION     • COLONOSCOPY N/A 2017    Procedure: COLONOSCOPY, polypectomy;  Surgeon: Lalita Dubose MD;  Location: [facility] OR;  Service:    • D&C HYSTEROSCOPY N/A 2017    Procedure: DILATATION AND CURETTAGE HYSTEROSCOPY;  Surgeon: Dee Morales MD;  Location: [facility] OR;  Service:    • DIAGNOSTIC LAPAROSCOPY Bilateral 2017    Procedure: DIAGNOSTIC LAPAROSCOPY,laproscopic bilateral salpingoophorectomy ;  Surgeon: Dee Morales MD;  Location: [facility] OR;  Service:    • DILATATION AND CURETTAGE     • HAND SURGERY Right    • MASTECTOMY Right 2017    Procedure: right total mastectomy, right axillary sentinel lymph node biopsy x2 with immediate  reconstruction of right breast with expander.;  Surgeon: Dyan Kinsey MD;  Location: [facility];  Service:    • PELVIC LAPAROSCOPY      BSO     No prescriptions prior to admission.     Allergies:  Sesame seed [sesame oil]    Objective     Vital Signs       Physical Exam:      General Appearance:    Alert, cooperative, in no acute distress   Head:    Normocephalic, without obvious abnormality, atraumatic   Eyes:            Lids and lashes normal, conjunctivae and sclerae normal, no   icterus, no pallor, corneas clear, PERRLA   Ears:    Ears appear intact with no abnormalities noted   Throat:   No oral lesions, no thrush, oral mucosa moist   Neck:   No adenopathy, supple, trachea midline, no thyromegaly, no     carotid bruit, no JVD   Back:     No kyphosis present, no scoliosis present, no skin lesions,       erythema or scars, no tenderness to percussion or                   palpation,   range of motion normal   Lungs:     Clear to auscultation,respirations regular, even and                   unlabored    Heart:    Regular rhythm and normal rate, normal S1 and S2, no            murmur, no gallop, no rub, no click   Breast Exam:    Right  Tissue expander, left breast ptosis   Abdomen:     Normal bowel sounds, no masses, no organomegaly, soft        non-tender, non-distended, no guarding, no rebound                 tenderness   Genitalia:    Deferred   Extremities:   Moves all extremities well, no edema, no cyanosis, no              redness   Pulses:   Pulses palpable and equal bilaterally   Skin:   No bleeding, bruising or rash   Lymph nodes:   No palpable adenopathy   Neurologic:   Cranial nerves 2 - 12 grossly intact, sensation intact, DTR        present and equal bilaterally              Assessment/Plan     Active Problems:    * No active hospital problems. *      Exchange of right expander for implant and left breast reduction    I discussed the patients findings and my recommendations with patient.      Maurine Waterhouse, MD  12/15/17  9:06 AM

## 2017-12-15 NOTE — ANESTHESIA PREPROCEDURE EVALUATION
Anesthesia Evaluation     Patient summary reviewed and Nursing notes reviewed   NPO Solid Status: > 8 hours       Airway   Mallampati: II  no difficulty expected  Dental - normal exam     Pulmonary     breath sounds clear to auscultation  (+) asthma,   Cardiovascular     Rhythm: regular  Rate: normal    (-) hypertension      Neuro/Psych  (+) headaches, psychiatric history,    GI/Hepatic/Renal/Endo    (+) obesity, morbid obesity, GERD,     Musculoskeletal     Abdominal    Substance History      OB/GYN          Other                                        Anesthesia Plan    ASA 2     general     intravenous induction   Anesthetic plan and risks discussed with patient.

## 2017-12-15 NOTE — ANESTHESIA PROCEDURE NOTES
Airway  Urgency: elective    Date/Time: 12/15/2017 2:27 PM  Airway not difficult    General Information and Staff    Patient location during procedure: OR  Anesthesiologist: JEFFY BUTTERFIELD  CRNA: SUSAN GRIGSBY    Indications and Patient Condition  Indications for airway management: airway protection    Preoxygenated: yes  Mask difficulty assessment: 1 - vent by mask    Final Airway Details  Final airway type: endotracheal airway      Successful airway: ETT  Cuffed: yes   Successful intubation technique: direct laryngoscopy  Facilitating devices/methods: intubating stylet  Endotracheal tube insertion site: oral  Blade: Andrew  Blade size: #2  ETT size: 7.0 mm  Cormack-Lehane Classification: grade I - full view of glottis  Placement verified by: chest auscultation and capnometry   Cuff volume (mL): 7  Measured from: lips  ETT to lips (cm): 20  Number of attempts at approach: 1    Additional Comments  EBBS: ETCO2+: Atraumatic intubation; Lips and teeth same as pre op

## 2017-12-15 NOTE — BRIEF OP NOTE
BREAST TISSUE EXPANDER REMOVAL INSERTION OF IMPLANT, BREAST REDUCTION BILATERAL  Progress Note    Jessica Suazo  12/15/2017    Pre-op Diagnosis:   Right tissue expander, left breast ptosis       Post-Op Diagnosis Codes:  same    Procedure/CPT® Codes:      Procedure(s):  RIGHT BREAST TISSUE EXPANDER REMOVAL INSERTION OF IMPLANT  LEFT BREAST REDUCTION     Surgeon(s):  Maurine Waterhouse, MD    Anesthesia: General    Staff:   Circulator: Analilia Corcoran RN  Scrub Person: Marnie Mata  Assistant: Lali Zuñiga RN    Estimated Blood Loss: 50 ml    Urine Voided: * No values recorded between 12/15/2017 12:00 AM and 12/15/2017  2:02 PM *    Specimens:                 left breast       Drains:    left breast drain       Findings: routine  Complications: none    Maurine Waterhouse, MD     Date: 12/15/2017  Time: 2:02 PM

## 2017-12-16 NOTE — ANESTHESIA POSTPROCEDURE EVALUATION
"Patient: Jessica Suazo    Procedure Summary     Date Anesthesia Start Anesthesia Stop Room / Location    12/15/17 2885 2101  [facility] / [facility]       Procedure Diagnosis Surgeon Provider    RIGHT BREAST TISSUE EXPANDER REMOVAL INSERTION OF IMPLANT (Right Breast); LEFT BREAST REDUCTION  (Left Chest) No diagnosis on file. Maurine Waterhouse, MD David M Passafiume, MD          Anesthesia Type: general  Last vitals  BP   118/77 (12/15/17 1850)   Temp   36.9 °C (98.5 °F) (12/15/17 1718)   Pulse   72 (12/15/17 1850)   Resp   16 (12/15/17 1850)     SpO2   92 % (12/15/17 1850)     Post Anesthesia Care and Evaluation    Patient location during evaluation: bedside  Patient participation: complete - patient participated  Level of consciousness: awake  Pain management: adequate  Airway patency: patent  Anesthetic complications: No anesthetic complications    Cardiovascular status: acceptable  Respiratory status: acceptable  Hydration status: acceptable    Comments: */77  Pulse 72  Temp 36.9 °C (98.5 °F) (Oral)   Resp 16  Ht 154.9 cm (60.98\")  Wt 90.4 kg (199 lb 4.8 oz)  SpO2 92%  BMI 37.68 kg/m2        "

## 2017-12-16 NOTE — DISCHARGE INSTRUCTIONS
****Remove the patch behind your ear tomorrow morning, discard and wash your hands****  See drain instruction sheet/log attached, see pt implant card      ***YOU TOOK A PAIN PILL AT 6:20 PM***  Outpatient Surgery Guidelines, Adult  Outpatient procedures are those for which the person having the procedure is allowed to go home the same day as the procedure. Various procedures are done on an outpatient basis. You should follow some general guidelines if you will be having an outpatient procedure.  AFTER THE  PROCEDURE  After surgery, you will be taken to a recovery area, where your progress will be monitored. If there are no complications, you will be allowed to go home when you are awake, stable, and taking fluids well. You may have numbness around the surgical site. Healing will take some time. You will have tenderness at the surgical site and may have some swelling and bruising. You may also have some nausea.  HOME CARE INSTRUCTIONS  · Do not drive for 24 hours, or as directed by your health care provider. Do not drive while taking prescription pain medicines.  · Do not drink alcohol for 24 hours.  · Do not make important decisions or sign legal documents for 24 hours.  · Plan on having a responsible adult stay with your for 24 hours following your procedure.  · You may resume a normal diet and activities as directed.  · Do not lift anything heavier than 10 pounds (4.5 kg) or play contact sports until your health care provider says it is okay.  · Only take over-the-counter or prescription medicines as directed by your health care provider.  · Follow up with your health care provider as directed.  SEEK MEDICAL CARE IF:  · You have increased bleeding (more than a small spot) from the surgical site.  · You have redness, swelling, or increasing pain in the wound.  · You see pus coming from the wound.  · You have a fever > 101.  · You notice a bad smell coming from the wound or dressing.  · You feel lightheaded or  faint.  · You develop a rash.  · You have trouble breathing.  · You develop allergies.  MAKE SURE YOU:  · Understand these instructions.  · Will watch your condition.  · Will get help right away if you are not doing well or get worse.      ****Empty and record drain every 8 hours.

## 2017-12-18 LAB
CYTO UR: NORMAL
LAB AP CASE REPORT: NORMAL
Lab: NORMAL
PATH REPORT.FINAL DX SPEC: NORMAL
PATH REPORT.GROSS SPEC: NORMAL

## 2017-12-20 NOTE — OP NOTE
PREOPERATIVE DIAGNOSES:    1.  History of right breast reconstruction following mastectomy for cancer.    2.  Left breast ptosis.      POSTOPERATIVE DIAGNOSES:    1.  History of right breast reconstruction following mastectomy for cancer.    2.  Left breast ptosis.      PROCEDURES PERFORMED:    1.  Right breast tissue expander removal with placement of permanent implant using a [place] smooth high-profile MemoryGel implant, size 750 mL.   2.  Left breast reduction removing a total of 321 g.     SURGEON:  Maurine Waterhouse, MD     ASSISTANT:  NENA Morley      ANESTHESIA:  General endotracheal anesthesia supplemented with local injection using 0.25% Marcaine with epinephrine.     INDICATIONS:  The patient is a 52-year-old woman who is status post right mastectomy for treatment of breast cancer.  She underwent placement of a tissue expander at the time of her mastectomy and is now scheduled for expander removal and permanent implant placement as well as a matching left breast reduction for symmetry.       DESCRIPTION OF PROCEDURE:  The patient was marked preoperatively in a standing position to micki the chest midline and the area for incision on the right lateral breast scar as well as the estimated area of capsulorrhaphy.  The left breast was marked by marking the breast meridian and the inframammary fold.  The estimated new position for elevation of the left nipple/areola was marked on the breast meridian and the estimated lower pole skin resection was marked by displacing the breast medially and laterally.      Kefzol was administered preoperatively.  The patient was then brought to the operating room where she was placed under general endotracheal anesthesia.  The chest was prepped and draped out.  Local anesthetic was injected into the lateral aspect of the right breast scar.  Through this, an incision was made and the capsule opened exposing the underlying expander.  The expander was drained and removed.   The pocket was then inspected and injected with local anesthetic extending from approximately 11-o'clock around to approximately 5-o'clock on the breast.  The capsule was then released at the position of the anterior capsule joining the chest wall.  This was done circumferentially beginning at approximately 11-o'clock and moving around clockwise down to approximately 5-o'clock.  Additional radial scoring was done in the capsule with some muscle relief in order to allow better expansion here.  The pocket was then inspected and irrigated with bacitracin irrigation.  A 750 mL, smooth gel, high-profile implant was placed into this pocket.  The incision was then closed with multiple layers of 2-0 Vicryl capsule and subcutaneous suture followed by a running 3-0 Vicryl subcuticular skin stitch.     The left breast reduction was then performed, first marking the outer areolar circumference with a 15 cm length suture and marking a new areolar diameter at 42 mm.  All incisions on the left were injected with local anesthetic.  The skin was then incised around the areola as well as the outer skin marking and the tissue between these incisions de-epithelialized.  The superior and medial-based pedicle was then developed first using cautery, and then the Arg[place] Bovie to divide through breast tissue down to the chest wall.  Excess breast was then removed from the superior, lateral, and inferior portions of the breast, removing approximately 180 g of tissue.  Bleeding points were cauterized.  The area was irrigated with bacitracin irrigation.  The areola was brought up into its new position and tacked to the outer dermal edges at 4 points with interrupted 2-0 Vicryl suture.  The vertical breast column was closed with a running 2-0 PDS suture in a deep layer and dermis approximated with a running 2-0 Vicryl dermal stitch.  The lower pole was left opened.  Excess lower pole was then resected at the inframammary fold level marking an  ellipse here, injecting with local anesthetic, and then excising additional skin and breast tissue to remove a total of 321 g.  The transverse incision was closed with deep interrupted and running 2-0 PDS suture.  The dermis was approximated with a running 3-0 Vicryl dermal stitch and the skin closed with a running 4-0 Monocryl subcuticular skin stitch.  The vertical incision was closed with a running 4-0 Monocryl subcuticular skin stitch.  Additional 4-0 Monocryl and 3-0 Vicryl dermal stitches were placed around the areola, followed by a running 5-0 Monocryl subcuticular skin stitch.  These incisions were closed over a 15-Sami drain brought out laterally.  The incisions were then covered with benzoin and Steri-strips.      The excised breast tissue was sent to pathology for permanent section.  Blood loss was approximately 50 mL.  The patient tolerated the procedure well and was discharged to the recovery room in stable condition.

## 2018-02-02 ENCOUNTER — APPOINTMENT (OUTPATIENT)
Dept: ONCOLOGY | Facility: CLINIC | Age: 53
End: 2018-02-02

## 2018-02-02 ENCOUNTER — OFFICE VISIT (OUTPATIENT)
Dept: ONCOLOGY | Facility: CLINIC | Age: 53
End: 2018-02-02

## 2018-02-02 ENCOUNTER — LAB (OUTPATIENT)
Dept: LAB | Facility: HOSPITAL | Age: 53
End: 2018-02-02

## 2018-02-02 ENCOUNTER — APPOINTMENT (OUTPATIENT)
Dept: LAB | Facility: HOSPITAL | Age: 53
End: 2018-02-02

## 2018-02-02 VITALS
HEIGHT: 61 IN | RESPIRATION RATE: 16 BRPM | TEMPERATURE: 97.8 F | WEIGHT: 188 LBS | SYSTOLIC BLOOD PRESSURE: 110 MMHG | HEART RATE: 78 BPM | BODY MASS INDEX: 35.5 KG/M2 | DIASTOLIC BLOOD PRESSURE: 72 MMHG | OXYGEN SATURATION: 95 %

## 2018-02-02 DIAGNOSIS — C50.411 MALIGNANT NEOPLASM OF UPPER-OUTER QUADRANT OF RIGHT BREAST IN FEMALE, ESTROGEN RECEPTOR POSITIVE (HCC): Primary | ICD-10-CM

## 2018-02-02 DIAGNOSIS — C50.411 MALIGNANT NEOPLASM OF UPPER-OUTER QUADRANT OF RIGHT BREAST IN FEMALE, ESTROGEN RECEPTOR POSITIVE (HCC): ICD-10-CM

## 2018-02-02 DIAGNOSIS — Z17.0 MALIGNANT NEOPLASM OF UPPER-OUTER QUADRANT OF RIGHT BREAST IN FEMALE, ESTROGEN RECEPTOR POSITIVE (HCC): ICD-10-CM

## 2018-02-02 DIAGNOSIS — M85.852 OSTEOPENIA OF LEFT THIGH: ICD-10-CM

## 2018-02-02 DIAGNOSIS — Z82.49 FAMILY HISTORY OF DVT: ICD-10-CM

## 2018-02-02 DIAGNOSIS — R23.2 HOT FLASHES: ICD-10-CM

## 2018-02-02 DIAGNOSIS — E55.9 VITAMIN D DEFICIENCY DISEASE: ICD-10-CM

## 2018-02-02 DIAGNOSIS — Z17.0 MALIGNANT NEOPLASM OF UPPER-OUTER QUADRANT OF RIGHT BREAST IN FEMALE, ESTROGEN RECEPTOR POSITIVE (HCC): Primary | ICD-10-CM

## 2018-02-02 LAB
25(OH)D3 SERPL-MCNC: 26.9 NG/ML (ref 30–100)
ALBUMIN SERPL-MCNC: 4 G/DL (ref 3.5–5.2)
ALBUMIN/GLOB SERPL: 1.4 G/DL (ref 1.1–2.4)
ALP SERPL-CCNC: 93 U/L (ref 38–116)
ALT SERPL W P-5'-P-CCNC: 19 U/L (ref 0–33)
ANION GAP SERPL CALCULATED.3IONS-SCNC: 13.1 MMOL/L
AST SERPL-CCNC: 20 U/L (ref 0–32)
BASOPHILS # BLD AUTO: 0.06 10*3/MM3 (ref 0–0.1)
BASOPHILS NFR BLD AUTO: 0.8 % (ref 0–1.1)
BILIRUB SERPL-MCNC: 0.5 MG/DL (ref 0.1–1.2)
BUN BLD-MCNC: 13 MG/DL (ref 6–20)
BUN/CREAT SERPL: 16 (ref 7.3–30)
CALCIUM SPEC-SCNC: 10 MG/DL (ref 8.5–10.2)
CHLORIDE SERPL-SCNC: 101 MMOL/L (ref 98–107)
CO2 SERPL-SCNC: 28.9 MMOL/L (ref 22–29)
CREAT BLD-MCNC: 0.81 MG/DL (ref 0.6–1.1)
DEPRECATED RDW RBC AUTO: 46.1 FL (ref 37–49)
EOSINOPHIL # BLD AUTO: 0.33 10*3/MM3 (ref 0–0.36)
EOSINOPHIL NFR BLD AUTO: 4.3 % (ref 1–5)
ERYTHROCYTE [DISTWIDTH] IN BLOOD BY AUTOMATED COUNT: 13.8 % (ref 11.7–14.5)
GFR SERPL CREATININE-BSD FRML MDRD: 75 ML/MIN/1.73
GLOBULIN UR ELPH-MCNC: 2.9 GM/DL (ref 1.8–3.5)
GLUCOSE BLD-MCNC: 172 MG/DL (ref 74–124)
HCT VFR BLD AUTO: 44.1 % (ref 34–45)
HGB BLD-MCNC: 14.5 G/DL (ref 11.5–14.9)
IMM GRANULOCYTES # BLD: 0.04 10*3/MM3 (ref 0–0.03)
IMM GRANULOCYTES NFR BLD: 0.5 % (ref 0–0.5)
LYMPHOCYTES # BLD AUTO: 2.27 10*3/MM3 (ref 1–3.5)
LYMPHOCYTES NFR BLD AUTO: 29.5 % (ref 20–49)
MCH RBC QN AUTO: 29.7 PG (ref 27–33)
MCHC RBC AUTO-ENTMCNC: 32.9 G/DL (ref 32–35)
MCV RBC AUTO: 90.4 FL (ref 83–97)
MONOCYTES # BLD AUTO: 0.74 10*3/MM3 (ref 0.25–0.8)
MONOCYTES NFR BLD AUTO: 9.6 % (ref 4–12)
NEUTROPHILS # BLD AUTO: 4.26 10*3/MM3 (ref 1.5–7)
NEUTROPHILS NFR BLD AUTO: 55.3 % (ref 39–75)
NRBC BLD MANUAL-RTO: 0 /100 WBC (ref 0–0)
PLATELET # BLD AUTO: 249 10*3/MM3 (ref 150–375)
PMV BLD AUTO: 10.7 FL (ref 8.9–12.1)
POTASSIUM BLD-SCNC: 4.2 MMOL/L (ref 3.5–4.7)
PROT SERPL-MCNC: 6.9 G/DL (ref 6.3–8)
RBC # BLD AUTO: 4.88 10*6/MM3 (ref 3.9–5)
SODIUM BLD-SCNC: 143 MMOL/L (ref 134–145)
WBC NRBC COR # BLD: 7.7 10*3/MM3 (ref 4–10)

## 2018-02-02 PROCEDURE — 99214 OFFICE O/P EST MOD 30 MIN: CPT | Performed by: INTERNAL MEDICINE

## 2018-02-02 PROCEDURE — 85025 COMPLETE CBC W/AUTO DIFF WBC: CPT | Performed by: INTERNAL MEDICINE

## 2018-02-02 PROCEDURE — 80053 COMPREHEN METABOLIC PANEL: CPT | Performed by: INTERNAL MEDICINE

## 2018-02-02 PROCEDURE — 36415 COLL VENOUS BLD VENIPUNCTURE: CPT | Performed by: INTERNAL MEDICINE

## 2018-02-02 PROCEDURE — 82306 VITAMIN D 25 HYDROXY: CPT | Performed by: INTERNAL MEDICINE

## 2018-02-02 RX ORDER — VENLAFAXINE HYDROCHLORIDE 75 MG/1
75 CAPSULE, EXTENDED RELEASE ORAL DAILY
Qty: 30 CAPSULE | Refills: 5 | Status: SHIPPED | OUTPATIENT
Start: 2018-02-02 | End: 2018-09-20 | Stop reason: SDUPTHER

## 2018-02-02 RX ORDER — ERGOCALCIFEROL 1.25 MG/1
50000 CAPSULE ORAL
Qty: 8 CAPSULE | Refills: 0 | Status: SHIPPED | OUTPATIENT
Start: 2018-02-02 | End: 2018-04-03 | Stop reason: SDUPTHER

## 2018-02-02 NOTE — PROGRESS NOTES
Subjective .     REASONS FOR FOLLOWUP:    1. Stage IA ([id number]) right breast cancer: 1.8 cm grade 2 invasive lobular carcinoma, ER positive (95%, IA positive (95%), HER-2/tanya negative (IHC 0), sentinel lymph node negative ×2.  Status post right mastectomy 8/23/17.  2. Genetics evaluation 7/19/17 with negative Gene DX panel test.  3. Strong family history of thrombosis with no detectable familial hypercoagulable factor.  No personal history of thrombosis.  Hypercoagulable evaluation negative 9/24/15, additional negative evaluation 8/3/17.  Continues chronically on aspirin 81 mg daily.  4. Status post bilateral salpingo-oophorectomy 9/26/17 achieving surgical postmenopausal state.  5. Osteopenia with Baseline DEXA scan 9/21/17 showing maximal T score -1.2 left femoral neck.  6. Vitamin D deficiency, continuing on vitamin D supplementation 2400 units daily  7. Initiation of adjuvant Arimidex 1 mg daily 10/10/17.  8. Postmenopausal hot flashes, initiated Effexor XR 37.5 mg daily 10/10/17.  Dose increased to 75 mg daily on 2/2/18.    HISTORY OF PRESENT ILLNESS:  The patient is a 52 y.o. year old female who is here for follow-up with the above-mentioned history.    History of Present Illness the patient returns today in follow-up continuing on treatment with Arimidex.  She is continuing to experience significant hot flashes experiencing 10-15 per day, worse at night.  Effexor XR 37.5 mg daily is not effective in controlling the hot flashes.  She does note continuing on vitamin D replacement 1 he 400 units daily but reports she is occasionally not compliant with this.  She did experience a episode of shingles involving her left forehead and upper orbit however did not develop any ocular involvement fortunately.  She was seen by ophthalmology on 2 occasions.  The skin lesions have resolved and healed at this point.  She did receive antiviral therapy.  She does note some degree of postherpetic neuralgia however this is  improving to some degree.  She does note ongoing fatigue.  She has had some continued edema in her right axillary region since her surgery.  She did undergo a anoscopy on 17 with Dr. Tom with 2 benign adenomas removed.  Subsequently she underwent additional reconstructive surgery with Dr. Waterhouse on 12/15/17 with removal of her right expander and placement of an implant as well as a left breast reduction.      Past Medical History:   Diagnosis Date   • Anxiety    • Asthma    • Breast cancer    • Cancer 2017    Right   • GERD (gastroesophageal reflux disease) 2017   • Herpes     + HSv no outbreaks   • Lichen sclerosus    • Lichen sclerosus et atrophicus 2017   • Migraine    • Polyp of colon, adenomatous      Past Surgical History:   Procedure Laterality Date   • BILATERAL BREAST REDUCTION Left 12/15/2017    Procedure: LEFT BREAST REDUCTION ;  Surgeon: Maurine Waterhouse, MD;  Location: [facility];  Service:    • BREAST BIOPSY Right 2017   • BREAST RECONSTRUCTION Right 2017    Procedure: RT TISSUE EXPANDER W/ ALLODERM;  Surgeon: Maurine Waterhouse, MD;  Location: [facility] OR;  Service:    • BREAST TISSUE EXPANDER REMOVAL INSERTION OF IMPLANT Right 12/15/2017    Procedure: RIGHT BREAST TISSUE EXPANDER REMOVAL INSERTION OF IMPLANT;  Surgeon: Maurine Waterhouse, MD;  Location: [facility] OR;  Service:    •  SECTION     • COLONOSCOPY N/A 2017    Procedure: COLONOSCOPY, polypectomy;  Surgeon: Lalita Dubose MD;  Location: [facility] OR;  Service:    • D&C HYSTEROSCOPY N/A 2017    Procedure: DILATATION AND CURETTAGE HYSTEROSCOPY;  Surgeon: Dee Morales MD;  Location: [facility] OR;  Service:    • DIAGNOSTIC LAPAROSCOPY Bilateral 2017    Procedure: DIAGNOSTIC LAPAROSCOPY,laproscopic bilateral salpingoophorectomy ;  Surgeon: Dee Morales MD;  Location: [facility] OR;  Service:    • DILATATION AND CURETTAGE     • HAND SURGERY Right    • MASTECTOMY Right  8/23/2017    Procedure: right total mastectomy, right axillary sentinel lymph node biopsy x2 with immediate reconstruction of right breast with expander.;  Surgeon: Dyan Kinsey MD;  Location: [facility];  Service:    • PELVIC LAPAROSCOPY      BSO       ONCOLOGIC HISTORY:  (History from previous dates can be found in the separate document.)    Current Outpatient Prescriptions on File Prior to Visit   Medication Sig Dispense Refill   • anastrozole (ARIMIDEX) 1 MG tablet Take 1 mg by mouth Daily.     • Calcium Carbonate-Vitamin D (CALCIUM PLUS VITAMIN D PO) Take 1,200 mg by mouth 2 (Two) Times a Day.     • fluticasone-salmeterol (ADVAIR) 100-50 MCG/DOSE DISKUS Inhale 1 puff As Needed (SOB OR WHEEZING).     • Multiple Vitamins-Minerals (MULTIVITAMIN ADULT PO) Take  by mouth.     • rosuvastatin (CRESTOR) 10 MG tablet Take 10 mg by mouth Daily.     • rosuvastatin (CRESTOR) 5 MG tablet Take 5 mg by mouth Every Night.     • [DISCONTINUED] venlafaxine XR (EFFEXOR XR) 37.5 MG 24 hr capsule Take 1 capsule by mouth Daily. (Patient taking differently: Take 37.5 mg by mouth Every Night.) 30 capsule 4     No current facility-administered medications on file prior to visit.        ALLERGIES:     Allergies   Allergen Reactions   • Sesame Seed [Sesame Oil] Anaphylaxis     Sunflower seed and the food family of it       Social History     Social History   • Marital status:      Spouse name: N/A   • Number of children: 1   • Years of education: College     Occupational History   •  [employer]     Social History Main Topics   • Smoking status: Former Smoker     Packs/day: 0.50     Years: 10.00     Types: Cigarettes     Quit date: 2007   • Smokeless tobacco: Never Used   • Alcohol use Yes      Comment: Occasional   • Drug use: No   • Sexual activity: Defer     Other Topics Concern   • Not on file     Social History Narrative     Family History   Problem Relation Age of Onset   • Breast cancer Mother  70   • Hearing loss Mother    • Hyperlipidemia Mother    • Diabetes Father    • Asthma Sister    • Clotting disorder Sister    • COPD Sister    • Pulmonary embolism Sister    • Deep vein thrombosis Brother    • Pulmonary embolism Brother    • Malig Hyperthermia Neg Hx         Review of Systems   Constitutional: Positive for fatigue. Negative for activity change, appetite change, fever and unexpected weight change.   HENT: Negative for congestion, mouth sores, nosebleeds, sore throat and voice change.    Eyes: Positive for pain.   Respiratory: Negative for cough, shortness of breath and wheezing.    Cardiovascular: Negative for chest pain, palpitations and leg swelling.   Gastrointestinal: Negative for abdominal distention, abdominal pain, blood in stool, constipation, diarrhea, nausea and vomiting.   Endocrine: Positive for heat intolerance. Negative for cold intolerance.   Genitourinary: Negative for difficulty urinating, dysuria, frequency, hematuria and pelvic pain.   Musculoskeletal: Negative for arthralgias, back pain, joint swelling and myalgias.   Skin: Negative for wound.   Neurological: Negative for dizziness, syncope, weakness, light-headedness, numbness and headaches.   Hematological: Negative for adenopathy. Does not bruise/bleed easily.   Psychiatric/Behavioral: Negative for confusion and sleep disturbance. The patient is not nervous/anxious.          Objective      Vitals:    02/02/18 0936   BP: 110/72   Pulse: 78   Resp: 16   Temp: 97.8 °F (36.6 °C)   SpO2: 95%      Current Status 2/2/2018   ECOG score 0   Pain 5/10    Physical Exam   Constitutional: She is oriented to person, place, and time. She appears well-developed and well-nourished.   HENT:   Mouth/Throat: Oropharynx is clear and moist.   Eyes: Conjunctivae are normal.   Neck: No thyromegaly present.   Cardiovascular: Normal rate and regular rhythm.  Exam reveals no gallop and no friction rub.    No murmur heard.  Pulmonary/Chest: Breath sounds  normal. No respiratory distress.   Right mastectomy with implant reconstruction.  Slight residual edema in the anterior axillary region.  No masses no abnormalities identified.  On the left, status post reduction surgery with no masses no abnormalities palpated.   Abdominal: Soft. Bowel sounds are normal. She exhibits no distension. There is no tenderness.   Musculoskeletal: She exhibits no edema.   Lymphadenopathy:        Head (right side): No submandibular adenopathy present.     She has no cervical adenopathy.     She has no axillary adenopathy.        Right: No inguinal and no supraclavicular adenopathy present.        Left: No inguinal and no supraclavicular adenopathy present.   Neurological: She is alert and oriented to person, place, and time. She displays normal reflexes. No cranial nerve deficit. She exhibits normal muscle tone.   Skin: Skin is warm and dry. No rash noted.   Resolving erythema related to prior shingles lesions involving the left forehead and superior orbit.   Psychiatric: She has a normal mood and affect. Her behavior is normal.       RECENT LABS:  Hematology WBC   Date Value Ref Range Status   02/02/2018 7.70 4.00 - 10.00 10*3/mm3 Final     RBC   Date Value Ref Range Status   02/02/2018 4.88 3.90 - 5.00 10*6/mm3 Final     Hemoglobin   Date Value Ref Range Status   02/02/2018 14.5 11.5 - 14.9 g/dL Final     Hematocrit   Date Value Ref Range Status   02/02/2018 44.1 34.0 - 45.0 % Final     Platelets   Date Value Ref Range Status   02/02/2018 249 150 - 375 10*3/mm3 Final        Lab Results   Component Value Date    GLUCOSE 172 (H) 02/02/2018    BUN 13 02/02/2018    CREATININE 0.81 02/02/2018    EGFRIFNONA 75 02/02/2018    BCR 16.0 02/02/2018    K 4.2 02/02/2018    CO2 28.9 02/02/2018    CALCIUM 10.0 02/02/2018    ALBUMIN 4.00 02/02/2018    LABIL2 1.4 02/02/2018    AST 20 02/02/2018    ALT 19 02/02/2018     25-hydroxy vitamin D level 10/10/17:17.8, 2/2/18: 26.9.    Assessment/Plan       1. Pathologic stage IA ([id number]) right breast cancer: The patient had a mammographically detected nonpalpable abnormality of the right breast measuring 1.3 cm by ultrasound in the 10 o'clock position with biopsy 7/3/17 showing an invasive lobular carcinoma largest size measuring 9 mm, grade 1, ER positive (95%), NH positive (95%), HER-2/tanya 0 (IHC).  There were foci of associated LCIS.  There was no lymphatic nor perineural invasion.  Oncotype DX testing was sent revealing a recurrent score in a low risk range at 12 (10 year recurrence risk of 8% with treatment with tamoxifen, 1% benefit from adjuvant chemotherapy) and confirmed ER positive, NH positive, HER-2/tanya negative.  MRI of the breasts 7/13/17 showed the biopsy-proven right breast malignancy measuring 1.9 cm with no axillary lymph node enlargement.  There was however in the middle third of the right breast a cluster of microcalcifications that appear to have increased over time.  An MRI guided biopsy of this area was performed 7/25/17 in the 9:30 position revealing an invasive lobular carcinoma, 4 mm, grade 2, ER positive (greater than 90%), NH positive (90%), HER-2/tanya 1+ recurrent sees IHC) with associated LCIS.  The patient was seen in the genetics clinic and underwent Gene DX panel testing 7/19/17 which was negative.  She has a family history of malignancy with breast cancer in her mother at age 70.  The patient saw Dr. Kinsey who discussed surgical management of her disease.  With evidence of multifocal involvement of the breast, it was recommended to undergo a right mastectomy with sentinel lymph node biopsy.  She did see Dr. Waterhouse as well in regards to reconstruction.    The patient was seen in initial consultation on 8/2/17. Oncotype DX score was in the low risk category at 12 indicating a 10 year recurrence risk of distant metastasis of 8% with tamoxifen alone.  We discussed that adjuvant chemotherapy would provide only approximately a  1% additional benefit.  It was not recommended for her to receive adjuvant chemotherapy.      Right mastectomy with implant reconstruction and sentinel lymph node biopsy performed on 8/23/17.  Pathology showed a 1.8 cm grade 2 invasive lobular carcinoma with negative margins (greater than 1 cm), extensive associated LCIS and non-atypical ductal hyperplasia, sentinel lymph node negative ×2 with evidence of multiple noncaseating granulomas.  Therefore, the patient had Stage IA ([id number]) disease.  Again, it was not recommended for her to receive adjuvant chemotherapy given her low risk Oncotype score.  This would provide only an additional 1% benefit.  We discussed pursuing adjuvant endocrine therapy.  As she was premenopausal, this would typically be undertaken with tamoxifen.  There was concern however given the patient's significant family history of thrombosis occurring at a young age with no identifiable/traceable risk factor.  Tamoxifen would confer a significant additional risk for her although she has not experienced a personal history of thrombosis.  Also, we discussed that there is suggestion from subgroup analysis of the BIG 1-98 study to suggest a significant additional benefit of aromatase inhibitor therapy over tamoxifen for invasive lobular cancer.  Therefore, the patient underwent bilateral salpingo-oophorectomy 9/26/17 (pathology benign) producing surgical postmenopausal state. Baseline DEXA scan showed mild osteopenia 9/21/17 with a T score in the left femoral neck -1.2.  On 10/10/17, initiated Arimidex 1 mg daily and Effexor XRT 37.5 mg daily.    Right expander removal and implant placement as well as left breast reduction performed 12/15/17.    Patient returns today in follow-up tingling on adjuvant Arimidex.  She is experiencing persistent hot flashes 10-15 per day occurring mainly at night despite the use of Effexor.  I have recommended that we increase her Effexor XR dose to 75 mg daily and a  prescription was sent today.  She will notify me if this is not effective.  She has no evidence of recurrent disease.  She will return here in 3 months for follow-up.  Will be due for  mammogram in July 2018.  2. Family history of thrombosis: The patient has no personal history of thrombosis.  She has a significant family history of thrombosis however with a brother who developed a pulmonary embolism in his 30s, a brother developed a DVT at age 44, and a sister with a pulmonary embolism at age 55, all unprovoked.  The patient herself did undergo a hypercoagulable evaluation performed by her primary care physician [facility] 9/24/15 which showed negative prothrombin gene mutation analysis, negative factor V Leiden gene mutation analysis, negative lupus anticoagulant, negative anticardiolipin antibodies, normal homocystine, protein C activity 168%, protein S free 107%.   we did perform additional hypercoagulable labs from 8/3/17 showing protein S free antigen 109%, protein S total antigen 125%, protein S activity 83%, antithrombin III activity 96%, beta-2 GP 1 antibody panel negative.  Therefore, the patient has no detectable underlying hypercoagulable state.  None of her family members however had a detectable risk factor either.  This does produce some uncertainty in regards to her risk with tamoxifen.  Therefore was elected to proceed with aromatase inhibitor therapy instead as she became surgically postmenopausal.  She does continue on aspirin 81 mg a day.    3. Osteopenia: The patient had a baseline DEXA scan from 9/21/17 showing mild osteopenia, maximal T score -1.2 and left femoral neck.  Anticipate a 2 year interval follow-up study on aromatase inhibitor therapy.  4. Vitamin D deficiency: 25 hydroxy vitamin D level on 10/10/17 was very low at 17.8.  The patient was asked to increase vitamin D supplementation to 2400 units daily.  Him and he level today is still low at 26.9.  She does report some  noncompliance with her supplementation.  Regardless, I will go ahead and prescribe at this point vitamin D 50,000 units weekly ×8 weeks and then she will return to her 2400 mg per day dose.  Recheck vitamin D level in 3 months.  5. Postmenopausal hot flashes: The patient was experiencing these postoperatively from her bilateral salpingo-oophorectomy.  With initiation of adjuvant aromatase inhibitor therapy on 10/10/17, it was elected to also currently begin Effexor XR 37.5 mg daily.  Unfortunately this is not adequately controlling her symptoms, continuing with tended 15 episodes per day, mostly at night.  I have therefore changed her Effexor XR dose to 75 mg daily.  She will notify us if this dose is not effective.  6. Health maintenance: The patient underwent a screening colonoscopy on 11/7/17 at age 52.  2 polyps were removed, benign adenomas.  7. Recent shingles: The patient had an episode of recent shingles involving her left forehead and superior orbital region.  Fortunately it did not involve her eye and she was seen by ophthalmology on 2 occasions.  Skin lesions have now resolved.  She is experiencing some mild postherpetic neuralgia however this appears to currently be improving.     Plan:    1. Continue adjuvant Arimidex 1 mg daily  2. Increase Effexor XR dose to 75 mg daily  3. Continue aspirin 81 mg daily  4. Change vitamin D replacement to 50,000 units weekly ×8 weeks and then the patient will return to 2400 units daily.  5. M.D. visit in 3 months with CBC, CMP, and 25 hydroxy vitamin D level.

## 2018-02-05 ENCOUNTER — TELEPHONE (OUTPATIENT)
Dept: ONCOLOGY | Facility: HOSPITAL | Age: 53
End: 2018-02-05

## 2018-02-05 NOTE — TELEPHONE ENCOUNTER
----- Message from Zhou Gallegos Jr., MD sent at 2/2/2018  6:21 PM EST -----  Please notify patient that vitamin D level was low and I have sent prescription to pharmacy for vit D 50,000 units weekly x 8 weeks then return to 2400 units daily  ----- Message -----     From: Lab, Background User     Sent: 2/2/2018   9:49 AM       To: Zhou Gallegos Jr., MD        Called pt, no answer. Left this info on v/m and asked her to call back to verify information.

## 2018-04-04 RX ORDER — ERGOCALCIFEROL 1.25 MG/1
CAPSULE ORAL
Qty: 8 CAPSULE | Refills: 0 | Status: SHIPPED | OUTPATIENT
Start: 2018-04-04 | End: 2020-09-02

## 2018-04-13 RX ORDER — ANASTROZOLE 1 MG/1
TABLET ORAL
Qty: 30 TABLET | Refills: 5 | Status: SHIPPED | OUTPATIENT
Start: 2018-04-13 | End: 2018-09-21 | Stop reason: SDUPTHER

## 2018-04-26 ENCOUNTER — TELEPHONE (OUTPATIENT)
Dept: SURGERY | Facility: CLINIC | Age: 53
End: 2018-04-26

## 2018-04-26 NOTE — TELEPHONE ENCOUNTER
Left message giving pt all appt details    JOSEPH Juan 06/25/2018 at 10:00  Amelie 07/09/2018 arrive at 1:15    Advised to call back if questions or concerns

## 2018-05-02 ENCOUNTER — LAB (OUTPATIENT)
Dept: LAB | Facility: HOSPITAL | Age: 53
End: 2018-05-02

## 2018-05-02 ENCOUNTER — OFFICE VISIT (OUTPATIENT)
Dept: ONCOLOGY | Facility: CLINIC | Age: 53
End: 2018-05-02

## 2018-05-02 VITALS
OXYGEN SATURATION: 96 % | HEART RATE: 93 BPM | TEMPERATURE: 98.2 F | DIASTOLIC BLOOD PRESSURE: 82 MMHG | BODY MASS INDEX: 35.68 KG/M2 | WEIGHT: 189 LBS | HEIGHT: 61 IN | RESPIRATION RATE: 16 BRPM | SYSTOLIC BLOOD PRESSURE: 104 MMHG

## 2018-05-02 DIAGNOSIS — C50.411 MALIGNANT NEOPLASM OF UPPER-OUTER QUADRANT OF RIGHT BREAST IN FEMALE, ESTROGEN RECEPTOR POSITIVE (HCC): ICD-10-CM

## 2018-05-02 DIAGNOSIS — Z17.0 MALIGNANT NEOPLASM OF UPPER-OUTER QUADRANT OF RIGHT BREAST IN FEMALE, ESTROGEN RECEPTOR POSITIVE (HCC): ICD-10-CM

## 2018-05-02 DIAGNOSIS — E55.9 VITAMIN D DEFICIENCY DISEASE: ICD-10-CM

## 2018-05-02 DIAGNOSIS — Z82.49 FAMILY HISTORY OF DVT: ICD-10-CM

## 2018-05-02 DIAGNOSIS — M85.852 OSTEOPENIA OF LEFT THIGH: ICD-10-CM

## 2018-05-02 DIAGNOSIS — J40 BRONCHITIS: ICD-10-CM

## 2018-05-02 DIAGNOSIS — C50.411 MALIGNANT NEOPLASM OF UPPER-OUTER QUADRANT OF RIGHT BREAST IN FEMALE, ESTROGEN RECEPTOR POSITIVE (HCC): Primary | ICD-10-CM

## 2018-05-02 DIAGNOSIS — Z17.0 MALIGNANT NEOPLASM OF UPPER-OUTER QUADRANT OF RIGHT BREAST IN FEMALE, ESTROGEN RECEPTOR POSITIVE (HCC): Primary | ICD-10-CM

## 2018-05-02 DIAGNOSIS — R23.2 HOT FLASHES: ICD-10-CM

## 2018-05-02 LAB
25(OH)D3 SERPL-MCNC: 46.4 NG/ML (ref 30–100)
ALBUMIN SERPL-MCNC: 4.4 G/DL (ref 3.5–5.2)
ALBUMIN/GLOB SERPL: 1.2 G/DL (ref 1.1–2.4)
ALP SERPL-CCNC: 131 U/L (ref 38–116)
ALT SERPL W P-5'-P-CCNC: 16 U/L (ref 0–33)
ANION GAP SERPL CALCULATED.3IONS-SCNC: 11.4 MMOL/L
AST SERPL-CCNC: 19 U/L (ref 0–32)
BASOPHILS # BLD AUTO: 0.08 10*3/MM3 (ref 0–0.1)
BASOPHILS NFR BLD AUTO: 0.7 % (ref 0–1.1)
BILIRUB SERPL-MCNC: 1.1 MG/DL (ref 0.1–1.2)
BUN BLD-MCNC: 13 MG/DL (ref 6–20)
BUN/CREAT SERPL: 20 (ref 7.3–30)
CALCIUM SPEC-SCNC: 9.7 MG/DL (ref 8.5–10.2)
CHLORIDE SERPL-SCNC: 103 MMOL/L (ref 98–107)
CO2 SERPL-SCNC: 28.6 MMOL/L (ref 22–29)
CREAT BLD-MCNC: 0.65 MG/DL (ref 0.6–1.1)
DEPRECATED RDW RBC AUTO: 41.5 FL (ref 37–49)
EOSINOPHIL # BLD AUTO: 0.25 10*3/MM3 (ref 0–0.36)
EOSINOPHIL NFR BLD AUTO: 2.3 % (ref 1–5)
ERYTHROCYTE [DISTWIDTH] IN BLOOD BY AUTOMATED COUNT: 12.5 % (ref 11.7–14.5)
GFR SERPL CREATININE-BSD FRML MDRD: 96 ML/MIN/1.73
GLOBULIN UR ELPH-MCNC: 3.6 GM/DL (ref 1.8–3.5)
GLUCOSE BLD-MCNC: 136 MG/DL (ref 74–124)
HCT VFR BLD AUTO: 46.8 % (ref 34–45)
HGB BLD-MCNC: 15.1 G/DL (ref 11.5–14.9)
IMM GRANULOCYTES # BLD: 0.05 10*3/MM3 (ref 0–0.03)
IMM GRANULOCYTES NFR BLD: 0.5 % (ref 0–0.5)
LYMPHOCYTES # BLD AUTO: 1.6 10*3/MM3 (ref 1–3.5)
LYMPHOCYTES NFR BLD AUTO: 14.7 % (ref 20–49)
MCH RBC QN AUTO: 29 PG (ref 27–33)
MCHC RBC AUTO-ENTMCNC: 32.3 G/DL (ref 32–35)
MCV RBC AUTO: 90 FL (ref 83–97)
MONOCYTES # BLD AUTO: 0.76 10*3/MM3 (ref 0.25–0.8)
MONOCYTES NFR BLD AUTO: 7 % (ref 4–12)
NEUTROPHILS # BLD AUTO: 8.12 10*3/MM3 (ref 1.5–7)
NEUTROPHILS NFR BLD AUTO: 74.8 % (ref 39–75)
NRBC BLD MANUAL-RTO: 0 /100 WBC (ref 0–0)
PLATELET # BLD AUTO: 288 10*3/MM3 (ref 150–375)
PMV BLD AUTO: 9.8 FL (ref 8.9–12.1)
POTASSIUM BLD-SCNC: 4.3 MMOL/L (ref 3.5–4.7)
PROT SERPL-MCNC: 8 G/DL (ref 6.3–8)
RBC # BLD AUTO: 5.2 10*6/MM3 (ref 3.9–5)
SODIUM BLD-SCNC: 143 MMOL/L (ref 134–145)
WBC NRBC COR # BLD: 10.86 10*3/MM3 (ref 4–10)

## 2018-05-02 PROCEDURE — 82306 VITAMIN D 25 HYDROXY: CPT | Performed by: INTERNAL MEDICINE

## 2018-05-02 PROCEDURE — 80053 COMPREHEN METABOLIC PANEL: CPT

## 2018-05-02 PROCEDURE — 85025 COMPLETE CBC W/AUTO DIFF WBC: CPT

## 2018-05-02 PROCEDURE — 36415 COLL VENOUS BLD VENIPUNCTURE: CPT | Performed by: INTERNAL MEDICINE

## 2018-05-02 PROCEDURE — 99214 OFFICE O/P EST MOD 30 MIN: CPT | Performed by: INTERNAL MEDICINE

## 2018-05-02 RX ORDER — HEPATITIS B VACCINE (RECOMBINANT) 20 UG/ML
INJECTION, SUSPENSION INTRAMUSCULAR
Refills: 0 | COMMUNITY
Start: 2018-04-24 | End: 2020-01-16

## 2018-05-02 RX ORDER — DOXYCYCLINE HYCLATE 100 MG
100 TABLET ORAL 2 TIMES DAILY
Qty: 14 TABLET | Refills: 0 | Status: SHIPPED | OUTPATIENT
Start: 2018-05-02 | End: 2018-05-09

## 2018-05-02 NOTE — PROGRESS NOTES
Subjective .     REASONS FOR FOLLOWUP:    1. Stage IA ([id number]) right breast cancer: 1.8 cm grade 2 invasive lobular carcinoma, ER positive (95%, IL positive (95%), HER-2/tanya negative (IHC 0), sentinel lymph node negative ×2.  Status post right mastectomy 8/23/17.  Oncotype DX recurrence score 12 (low risk).  2. Genetics evaluation 7/19/17 with negative Gene DX panel test.  3. Strong family history of thrombosis with no detectable familial hypercoagulable factor.  No personal history of thrombosis.  Hypercoagulable evaluation negative 9/24/15, additional negative evaluation 8/3/17.  Continues chronically on aspirin 81 mg daily.  4. Status post bilateral salpingo-oophorectomy 9/26/17 achieving surgical postmenopausal state.  5. Osteopenia with Baseline DEXA scan 9/21/17 showing maximal T score -1.2 left femoral neck.  6. Vitamin D deficiency, treated with high-dose vitamin D 50,000 units weekly ×8 weeks with subsequent transition back to 2400 units daily 5/3/18.  7. Initiation of adjuvant Arimidex 1 mg daily 10/10/17.  8. Postmenopausal hot flashes, initiated Effexor XR 37.5 mg daily 10/10/17.  Dose increased to 75 mg daily on 2/2/18.    HISTORY OF PRESENT ILLNESS:  The patient is a 52 y.o. year old female who is here for follow-up with the above-mentioned history.    History of Present Illness the patient returns today in follow-up continuing on treatment with Arimidex.  At the last visit on 2/2/18, we increased her Effexor dose to 75 mg daily due to continued hot flashes.  This has been effective and she reports only experiencing to tolerable hot flashes per day at this point.  She did just complete her 8 weeks of vitamin D 50,000 units and is now going to transition back to 2400 units daily.  She reports feeling much better since taking the higher dose vitamin D.  She still has additional reconstructive surgery with plastic surgery that has not yet been scheduled.  She does have her follow-up mammogram scheduled  in [place] on 18 and has further follow-up in late  with gynecology and subsequently in early July with Dr. Kinsey.  She has recovered well from her episode of shingles with only some minimal residual discomfort in her left forehead.  She does note a recent episode of cough productive of greenish sputum and sinus congestion but denies any fever or shortness of breath.  Symptoms have been ongoing for 1 week.      Past Medical History:   Diagnosis Date   • Anxiety    • Asthma    • Breast cancer    • Cancer 2017    Right   • GERD (gastroesophageal reflux disease) 2017   • Herpes     + HSv no outbreaks   • Lichen sclerosus    • Lichen sclerosus et atrophicus 2017   • Migraine    • Polyp of colon, adenomatous      Past Surgical History:   Procedure Laterality Date   • BILATERAL BREAST REDUCTION Left 12/15/2017    Procedure: LEFT BREAST REDUCTION ;  Surgeon: Maurine Waterhouse, MD;  Location: [facility] OR;  Service:    • BREAST BIOPSY Right 2017   • BREAST RECONSTRUCTION Right 2017    Procedure: RT TISSUE EXPANDER W/ ALLODERM;  Surgeon: Maurine Waterhouse, MD;  Location: [facility] OR;  Service:    • BREAST TISSUE EXPANDER REMOVAL INSERTION OF IMPLANT Right 12/15/2017    Procedure: RIGHT BREAST TISSUE EXPANDER REMOVAL INSERTION OF IMPLANT;  Surgeon: Maurine Waterhouse, MD;  Location: [facility] OR;  Service:    •  SECTION     • COLONOSCOPY N/A 2017    Procedure: COLONOSCOPY, polypectomy;  Surgeon: Lalita Dubose MD;  Location: [facility] OR;  Service:    • D&C HYSTEROSCOPY N/A 2017    Procedure: DILATATION AND CURETTAGE HYSTEROSCOPY;  Surgeon: Dee Morales MD;  Location: [facility] OR;  Service:    • DIAGNOSTIC LAPAROSCOPY Bilateral 2017    Procedure: DIAGNOSTIC LAPAROSCOPY,laproscopic bilateral salpingoophorectomy ;  Surgeon: Dee Morales MD;  Location: [facility] OR;  Service:    • DILATATION AND CURETTAGE     • HAND SURGERY Right    • MASTECTOMY Right  8/23/2017    Procedure: right total mastectomy, right axillary sentinel lymph node biopsy x2 with immediate reconstruction of right breast with expander.;  Surgeon: Dyan Kinsey MD;  Location: [facility];  Service:    • PELVIC LAPAROSCOPY      BSO       ONCOLOGIC HISTORY:  (History from previous dates can be found in the separate document.)    Current Outpatient Prescriptions on File Prior to Visit   Medication Sig Dispense Refill   • anastrozole (ARIMIDEX) 1 MG tablet take 1 tablet by mouth once daily 30 tablet 5   • Calcium Carbonate-Vitamin D (CALCIUM PLUS VITAMIN D PO) Take 1,200 mg by mouth 2 (Two) Times a Day.     • fluticasone-salmeterol (ADVAIR) 100-50 MCG/DOSE DISKUS Inhale 1 puff As Needed (SOB OR WHEEZING).     • Multiple Vitamins-Minerals (MULTIVITAMIN ADULT PO) Take  by mouth.     • rosuvastatin (CRESTOR) 5 MG tablet Take 5 mg by mouth Every Night.     • venlafaxine XR (EFFEXOR XR) 75 MG 24 hr capsule Take 1 capsule by mouth Daily. 30 capsule 5   • vitamin D (ERGOCALCIFEROL) 23760 units capsule capsule take 1 capsule by mouth every week 8 capsule 0   • [DISCONTINUED] anastrozole (ARIMIDEX) 1 MG tablet Take 1 mg by mouth Daily.     • [DISCONTINUED] rosuvastatin (CRESTOR) 10 MG tablet Take 10 mg by mouth Daily.       No current facility-administered medications on file prior to visit.        ALLERGIES:     Allergies   Allergen Reactions   • Sesame Seed [Sesame Oil] Anaphylaxis     Sunflower seed and the food family of it       Social History     Social History   • Marital status:      Spouse name: N/A   • Number of children: 1   • Years of education: College     Occupational History   •  [employer]     Social History Main Topics   • Smoking status: Former Smoker     Packs/day: 0.50     Years: 10.00     Types: Cigarettes     Quit date: 2007   • Smokeless tobacco: Never Used   • Alcohol use Yes      Comment: Occasional   • Drug use: No   • Sexual activity: Defer      Other Topics Concern   • Not on file     Social History Narrative   • No narrative on file     Family History   Problem Relation Age of Onset   • Breast cancer Mother 70   • Hearing loss Mother    • Hyperlipidemia Mother    • Diabetes Father    • Asthma Sister    • Clotting disorder Sister    • COPD Sister    • Pulmonary embolism Sister    • Deep vein thrombosis Brother    • Pulmonary embolism Brother    • Malig Hyperthermia Neg Hx         Review of Systems   Constitutional: Positive for fatigue. Negative for activity change, appetite change, fever and unexpected weight change.   HENT: Positive for congestion. Negative for mouth sores, nosebleeds, sore throat and voice change.    Respiratory: Positive for cough. Negative for shortness of breath and wheezing.    Cardiovascular: Negative for chest pain, palpitations and leg swelling.   Gastrointestinal: Negative for abdominal distention, abdominal pain, blood in stool, constipation, diarrhea, nausea and vomiting.   Endocrine: Positive for heat intolerance. Negative for cold intolerance.   Genitourinary: Negative for difficulty urinating, dysuria, frequency, hematuria and pelvic pain.   Musculoskeletal: Negative for arthralgias, back pain, joint swelling and myalgias.   Skin: Negative for wound.   Neurological: Negative for dizziness, syncope, weakness, light-headedness, numbness and headaches.   Hematological: Negative for adenopathy. Does not bruise/bleed easily.   Psychiatric/Behavioral: Negative for confusion and sleep disturbance. The patient is not nervous/anxious.          Objective      Vitals:    05/02/18 1356   BP: 104/82   Pulse: 93   Resp: 16   Temp: 98.2 °F (36.8 °C)   SpO2: 96%      Current Status 5/2/2018   ECOG score 0   Pain 0/10    Physical Exam   Constitutional: She is oriented to person, place, and time. She appears well-developed and well-nourished.   HENT:   Mouth/Throat: Oropharynx is clear and moist.   Eyes: Conjunctivae are normal.   Neck:  No thyromegaly present.   Cardiovascular: Normal rate and regular rhythm.  Exam reveals no gallop and no friction rub.    No murmur heard.  Pulmonary/Chest: Breath sounds normal. No respiratory distress.   Right mastectomy with implant reconstruction.  No masses no abnormalities identified.  On the left, status post reduction surgery with no masses no abnormalities palpated.   Abdominal: Soft. Bowel sounds are normal. She exhibits no distension. There is no tenderness.   Musculoskeletal: She exhibits no edema.   Lymphadenopathy:        Head (right side): No submandibular adenopathy present.     She has no cervical adenopathy.     She has no axillary adenopathy.        Right: No inguinal and no supraclavicular adenopathy present.        Left: No inguinal and no supraclavicular adenopathy present.   Neurological: She is alert and oriented to person, place, and time. She displays normal reflexes. No cranial nerve deficit. She exhibits normal muscle tone.   Skin: Skin is warm and dry. No rash noted.   Psychiatric: She has a normal mood and affect. Her behavior is normal.       RECENT LABS:  Hematology WBC   Date Value Ref Range Status   05/02/2018 10.86 (H) 4.00 - 10.00 10*3/mm3 Final     RBC   Date Value Ref Range Status   05/02/2018 5.20 (H) 3.90 - 5.00 10*6/mm3 Final     Hemoglobin   Date Value Ref Range Status   05/02/2018 15.1 (H) 11.5 - 14.9 g/dL Final     Hematocrit   Date Value Ref Range Status   05/02/2018 46.8 (H) 34.0 - 45.0 % Final     Platelets   Date Value Ref Range Status   05/02/2018 288 150 - 375 10*3/mm3 Final        Lab Results   Component Value Date    GLUCOSE 172 (H) 02/02/2018    BUN 13 02/02/2018    CREATININE 0.81 02/02/2018    EGFRIFNONA 75 02/02/2018    BCR 16.0 02/02/2018    K 4.2 02/02/2018    CO2 28.9 02/02/2018    CALCIUM 10.0 02/02/2018    ALBUMIN 4.00 02/02/2018    LABIL2 1.4 02/02/2018    AST 20 02/02/2018    ALT 19 02/02/2018     25-hydroxy vitamin D level 10/10/17:17.8, 2/2/18:  26.9.Repeat level pending today    Assessment/Plan      1. Pathologic stage IA ([id number]) right breast cancer: The patient had a mammographically detected nonpalpable abnormality of the right breast measuring 1.3 cm by ultrasound in the 10 o'clock position with biopsy 7/3/17 showing an invasive lobular carcinoma largest size measuring 9 mm, grade 1, ER positive (95%), MA positive (95%), HER-2/tanya 0 (IHC).  There were foci of associated LCIS.  There was no lymphatic nor perineural invasion.  Oncotype DX testing was sent revealing a recurrent score in a low risk range at 12 (10 year recurrence risk of 8% with treatment with tamoxifen, 1% benefit from adjuvant chemotherapy) and confirmed ER positive, MA positive, HER-2/tanya negative.  MRI of the breasts 7/13/17 showed the biopsy-proven right breast malignancy measuring 1.9 cm with no axillary lymph node enlargement.  There was however in the middle third of the right breast a cluster of microcalcifications that appear to have increased over time.  An MRI guided biopsy of this area was performed 7/25/17 in the 9:30 position revealing an invasive lobular carcinoma, 4 mm, grade 2, ER positive (greater than 90%), MA positive (90%), HER-2/tanya 1+ recurrent sees IHC) with associated LCIS.  The patient was seen in the genetics clinic and underwent Gene DX panel testing 7/19/17 which was negative.  She has a family history of malignancy with breast cancer in her mother at age 70.  The patient saw Dr. Kinsey who discussed surgical management of her disease.  With evidence of multifocal involvement of the breast, it was recommended to undergo a right mastectomy with sentinel lymph node biopsy.  She did see Dr. Waterhouse as well in regards to reconstruction.    The patient was seen in initial consultation on 8/2/17. Oncotype DX score was in the low risk category at 12 indicating a 10 year recurrence risk of distant metastasis of 8% with tamoxifen alone.  We discussed that  adjuvant chemotherapy would provide only approximately a 1% additional benefit.  It was not recommended for her to receive adjuvant chemotherapy.      Right mastectomy with implant reconstruction and sentinel lymph node biopsy performed on 8/23/17.  Pathology showed a 1.8 cm grade 2 invasive lobular carcinoma with negative margins (greater than 1 cm), extensive associated LCIS and non-atypical ductal hyperplasia, sentinel lymph node negative ×2 with evidence of multiple noncaseating granulomas.  Therefore, the patient had Stage IA ([id number]) disease.  Again, it was not recommended for her to receive adjuvant chemotherapy given her low risk Oncotype score.  This would provide only an additional 1% benefit.  We discussed pursuing adjuvant endocrine therapy.  As she was premenopausal, this would typically be undertaken with tamoxifen.  There was concern however given the patient's significant family history of thrombosis occurring at a young age with no identifiable/traceable risk factor.  Tamoxifen would confer a significant additional risk for her although she has not experienced a personal history of thrombosis.  Also, we discussed that there is suggestion from subgroup analysis of the BIG 1-98 study to suggest a significant additional benefit of aromatase inhibitor therapy over tamoxifen for invasive lobular cancer.  Therefore, the patient underwent bilateral salpingo-oophorectomy 9/26/17 (pathology benign) producing surgical postmenopausal state. Baseline DEXA scan showed mild osteopenia 9/21/17 with a T score in the left femoral neck -1.2.  On 10/10/17, initiated Arimidex 1 mg daily and Effexor XRT 37.5 mg daily.    Right expander removal and implant placement as well as left breast reduction performed 12/15/17.    Significant hot flashes prompted increase in Effexor dose to 75 mg daily 2/2/18.    The patient returns in follow-up today continuing on adjuvant Arimidex and tolerating treatment fairly well.  Her hot  flashes are significantly improved at this point and we will maintain on the current dose of Effexor.  She is due for her follow-up mammogram which is scheduled in [place] on 6/25/18.  She has subsequent follow-up with Dr. Kinsey in early July.  Her exam today is negative.  I will see her back in 3 months for follow-up.  2. Family history of thrombosis: The patient has no personal history of thrombosis.  She has a significant family history of thrombosis however with a brother who developed a pulmonary embolism in his 30s, a brother developed a DVT at age 44, and a sister with a pulmonary embolism at age 55, all unprovoked.  The patient herself did undergo a hypercoagulable evaluation performed by her primary care physician [facility] 9/24/15 which showed negative prothrombin gene mutation analysis, negative factor V Leiden gene mutation analysis, negative lupus anticoagulant, negative anticardiolipin antibodies, normal homocystine, protein C activity 168%, protein S free 107%.   we did perform additional hypercoagulable labs from 8/3/17 showing protein S free antigen 109%, protein S total antigen 125%, protein S activity 83%, antithrombin III activity 96%, beta-2 GP 1 antibody panel negative.  Therefore, the patient has no detectable underlying hypercoagulable state.  None of her family members however had a detectable risk factor either.  This does produce some uncertainty in regards to her risk with tamoxifen.  Therefore was elected to proceed with aromatase inhibitor therapy instead as she became surgically postmenopausal.  She does continue on aspirin 81 mg a day.    3. Osteopenia: The patient had a baseline DEXA scan from 9/21/17 showing mild osteopenia, maximal T score -1.2 and left femoral neck.  Anticipate a 2 year interval follow-up study on aromatase inhibitor therapy.  4. Vitamin D deficiency: 25 hydroxy vitamin D level on 10/10/17 was very low at 17.8.  The patient was asked to increase  vitamin D supplementation to 2400 units daily.  Vitamin D level was still low at 26.9 on 2/2/18.  High dose vitamin D prescribed 50,000 units weekly ×8 weeks which she just completed.  I have asked her to return to her previous dose 2400 units daily.  Follow-up 25-hydroxy vitamin D level pending from today.  5. Postmenopausal hot flashes: The patient was experiencing these postoperatively from her bilateral salpingo-oophorectomy.  With initiation of adjuvant aromatase inhibitor therapy on 10/10/17, it was elected to also currently begin Effexor XR 37.5 mg daily.  Unfortunately this did not adequately control her symptoms and Effexor dose was increased to 75 mg daily 2/2/18.  This has been effective with tolerable hot flashes currently and we will maintain the current dose.  6. Health maintenance: The patient underwent a screening colonoscopy on 11/7/17 at age 52.  2 polyps were removed, benign adenomas.  7. Recent shingles: The patient had an episode of recent shingles involving her left forehead and superior orbital region.  Fortunately it did not involve her eye and she was seen by ophthalmology on 2 occasions.  Skin lesions have now resolved.  She is experiencing some mild postherpetic neuralgia however this appears to have nearly resolved.     Plan:    1. Continue adjuvant Arimidex 1 mg daily  2. Continue Effexor XR dose to 75 mg daily  3. Continue aspirin 81 mg daily  4. Resumed previous vitamin D dose 2400 units daily.  Repeat 25-hydroxy vitamin D level pending today.  5. Follow-up with plastic surgery for further planned reconstructive procedures  6. Left mammogram as scheduled 6/25/18 with subsequent follow-up with Dr. Kinsey in July 2018.  7. M.D. visit in 3 months with CBC, CMP.

## 2018-06-05 NOTE — PLAN OF CARE
Problem: Patient Care Overview (Adult)  Goal: Plan of Care Review  Outcome: Ongoing (interventions implemented as appropriate)    12/15/17 1141   Coping/Psychosocial Response Interventions   Plan Of Care Reviewed With patient   Patient Care Overview   Progress no change       Goal: Adult Individualization and Mutuality  Outcome: Ongoing (interventions implemented as appropriate)    12/15/17 1141   Individualization   Patient Specific Preferences Call pt Criss   Patient Specific Goals No infection after surgery.   Patient Specific Interventions Teach good hnd hygiene.       Goal: Discharge Needs Assessment  Outcome: Ongoing (interventions implemented as appropriate)    12/15/17 1141   Discharge Needs Assessment   Concerns To Be Addressed denies needs/concerns at this time   Current Health   Anticipated Changes Related to Illness none   Self-Care   Equipment Currently Used at Home none         Problem: Perioperative Period (Adult)  Goal: Signs and Symptoms of Listed Potential Problems Will be Absent or Manageable (Perioperative Period)  Outcome: Ongoing (interventions implemented as appropriate)    12/15/17 1141   Perioperative Period   Problems Present (Perioperative Period) none           
Problem: Patient Care Overview (Adult)  Goal: Plan of Care Review  Outcome: Outcome(s) achieved Date Met:  12/15/17    12/15/17 1953   Coping/Psychosocial Response Interventions   Plan Of Care Reviewed With patient;spouse   Patient Care Overview   Progress improving   Outcome Evaluation   Outcome Summary/Follow up Plan ready for discharge       Goal: Adult Individualization and Mutuality  Outcome: Outcome(s) achieved Date Met:  12/15/17  Goal: Discharge Needs Assessment  Outcome: Outcome(s) achieved Date Met:  12/15/17    Problem: Perioperative Period (Adult)  Goal: Signs and Symptoms of Listed Potential Problems Will be Absent or Manageable (Perioperative Period)  Outcome: Outcome(s) achieved Date Met:  12/15/17    12/15/17 1953   Perioperative Period   Problems Assessed (Perioperative Period) all   Problems Present (Perioperative Period) none           
normal bilaterally

## 2018-06-25 ENCOUNTER — HOSPITAL ENCOUNTER (OUTPATIENT)
Dept: MAMMOGRAPHY | Facility: HOSPITAL | Age: 53
Discharge: HOME | End: 2018-06-25
Admitting: SURGERY

## 2018-06-25 DIAGNOSIS — Z82.49 FAMILY HISTORY OF DVT: ICD-10-CM

## 2018-06-25 DIAGNOSIS — Z80.3 FH: BREAST CANCER: ICD-10-CM

## 2018-06-25 DIAGNOSIS — E66.9 OBESITY (BMI 35.0-39.9 WITHOUT COMORBIDITY): ICD-10-CM

## 2018-06-25 DIAGNOSIS — C50.411 MALIGNANT NEOPLASM OF UPPER-OUTER QUADRANT OF RIGHT FEMALE BREAST (HCC): ICD-10-CM

## 2018-06-25 PROCEDURE — 77063 BREAST TOMOSYNTHESIS BI: CPT

## 2018-06-25 PROCEDURE — 77067 SCR MAMMO BI INCL CAD: CPT

## 2018-07-02 ENCOUNTER — TELEPHONE (OUTPATIENT)
Dept: SURGERY | Facility: CLINIC | Age: 53
End: 2018-07-02

## 2018-07-02 NOTE — TELEPHONE ENCOUNTER
[facility] left screening mammogram with 3-D: Heterogenous Shankar dense parenchyma.  Mild architectural distortion left breast related to surgery.  BI-RADS 2.

## 2018-07-10 ENCOUNTER — OFFICE VISIT (OUTPATIENT)
Dept: SURGERY | Facility: CLINIC | Age: 53
End: 2018-07-10

## 2018-07-10 VITALS
OXYGEN SATURATION: 95 % | BODY MASS INDEX: 36.25 KG/M2 | HEART RATE: 85 BPM | SYSTOLIC BLOOD PRESSURE: 145 MMHG | HEIGHT: 61 IN | DIASTOLIC BLOOD PRESSURE: 78 MMHG | WEIGHT: 192 LBS

## 2018-07-10 DIAGNOSIS — E66.9 OBESITY (BMI 35.0-39.9 WITHOUT COMORBIDITY): ICD-10-CM

## 2018-07-10 DIAGNOSIS — Z17.0 MALIGNANT NEOPLASM OF UPPER-OUTER QUADRANT OF RIGHT BREAST IN FEMALE, ESTROGEN RECEPTOR POSITIVE (HCC): Primary | ICD-10-CM

## 2018-07-10 DIAGNOSIS — Z82.49 FAMILY HISTORY OF DVT: ICD-10-CM

## 2018-07-10 DIAGNOSIS — C50.411 MALIGNANT NEOPLASM OF UPPER-OUTER QUADRANT OF RIGHT BREAST IN FEMALE, ESTROGEN RECEPTOR POSITIVE (HCC): Primary | ICD-10-CM

## 2018-07-10 DIAGNOSIS — Z80.3 FH: BREAST CANCER: ICD-10-CM

## 2018-07-10 PROCEDURE — 99213 OFFICE O/P EST LOW 20 MIN: CPT | Performed by: SURGERY

## 2018-07-10 NOTE — PROGRESS NOTES
Chief Complaint: Jessica Suazo is a 52 y.o. female who was seen in consultation at the request of No ref. provider found  for Malignant neoplasm of upper-outer quadrant of right female breast  and a followup visit    History of Present Illness:  Patient presents with newly diagnosed breast cancer, RIGHT breast. She noted no new masses, skin changes, nipple discharge, nipple changes prior to her most recent imaging.  Her most recent imaging includes the following: June 22, 2017 heterogenous a dense breast tissue.  2 cm mass in the axillary tail the right breast, BI-RADS 0.  Most previous prior was January 11, 2016 screening mammogram that showed bilateral calcifications right greater than left with no change.  BI-RADS 2.  She came back June 28, 2017 for a right breast ultrasound that showed at the 10:00 location a mass in the right breast measuring 1.3 cm, BI-RADS 5.  July 3, 2017 underwent an ultrasound-guided biopsy for cores a 14-gauge.  Marker left at the medial margin of the lesion.  Pathology returned as invasive lobular carcinoma, low grade, 3, 1, 1, foci of LCIS, no lymphovascular invasion, estrogen 95 progesterone 95 and HER-2/tanya 0.    I then had her get a bilateral breast MRI at [facility] July 14, 2017.  Right breast 10:00, 9 cm from the nipple a 1.9 x 1.7 cm mass with a clip at the posterior margin.  Bilateral short scattered segmental enhancement.  Reviewed outside mammogram and felt that the calcifications are greater on the right than the left.  Also some areas of short segment enhancement right breast.  Recommend sampling either of calcifications or enhancement.  I discussed this in person with Dr. Barron and this was his assessment.    She has not had a breast biopsy in the past.  She has her uterus and ovaries, is premenopausal, and takes no hormones.  Her family history includes the following: She has one son who is 20.  She has one sister, 7 brothers, no maternal aunts, 4 paternal  aunts.  She has 2 brothers and 1 sister who had DVTs and/or pulmonary emboli.  Her mother had breast cancer age 70, no other breast or ovarian cancer in her family.    Oncotype DX score dated July 25, 2017 returned as low risk score of 12 with a 10 year risk of distant recurrence on tamoxifen alone of 8%      Genetic counseling on Jessica Suazo dated July 19, 2017-Gene DX multi-gene panel was sent. Negative for mutation.  Pathology from LEFT total mastectomy and LEFT SLNB with immediate recosntruction Dr Waterhouse returned as   In situ and invasive lobular carcinoma, intermediate grade, 3, 2, 1, no duct carcinoma in situ present but lobular carcinoma in situ was present.  Greatest dimension of invasive carcinoma 1.8 cm.  Margins are uninvolved with closest margin greater than 1 cm.  0 of 2 lymph nodes involved. Extensive non-atypical duct hyperplasia and LCIS in the mastectomy specimen.   Pathologic stage TI CN 0.   She has had one of HER-2 drains removed.  She denies any redness warmth from her incision.    Interval HIstory:  In the interim, Jessica Suazo  has done well.  She had a BSO with Dr Morales 9-2017.  Dr Gallegos started arimidex 10-10-17. She then started effexor and vitamin D.  She had her expander exchanged for implant on the RIGHT and a LEFT reduction in  with Dr Waterhouse (12-15-17).    She has noted no changes in her breast exam. No new masses, skin changes, nipple changes, nipple discharge LEFT  breast.  No nodules or discolorations RIGHT reconstructed side.    She denies headache, bone pain, belly pain, cough, changes in vision or gait.    Her most recent imaging includes the following:  [facility] left screening mammogram with 3-D: Heterogenouslydense parenchyma.  Mild architectural distortion left breast related to surgery.  BI-RADS 2.    Review of Systems:  Review of Systems   Constitutional: Negative for unexpected weight change (5 lb wt loss).   All other systems  reviewed and are negative.       Past Medical and Surgical History:  Breast Biopsy History:  Patient had not had a breast biopsy prior to her cancer diagnosis.  Breast Cancer HIstory:  Patient does not have a past medical history of breast cancer.  Breast Operations, and year:  None   Obstetric/Gynecologic History:  Age menstrual periods began: 14  Patient is premenopausal, first day of last period: 2017  Number of pregnancies:1  Number of live births: 1  Number of abortions or miscarriages: 0  Age of delivery of first child: 29  Patient did not breast feed.  Length of time taking birth control pills:6 yrs   Patient has never taken hormone replacement  Patient still has uterus and ovaries.     Past Surgical History:   Procedure Laterality Date   • BILATERAL BREAST REDUCTION Left 12/15/2017    Procedure: LEFT BREAST REDUCTION ;  Surgeon: Maurine Waterhouse, MD;  Location: [facility];  Service:    • BREAST BIOPSY Right 2017   • BREAST RECONSTRUCTION Right 2017    Procedure: RT TISSUE EXPANDER W/ ALLODERM;  Surgeon: Maurine Waterhouse, MD;  Location: [facility] OR;  Service:    • BREAST TISSUE EXPANDER REMOVAL INSERTION OF IMPLANT Right 12/15/2017    Procedure: RIGHT BREAST TISSUE EXPANDER REMOVAL INSERTION OF IMPLANT;  Surgeon: Maurine Waterhouse, MD;  Location: [facility] OR;  Service:    •  SECTION     • COLONOSCOPY N/A 2017    Procedure: COLONOSCOPY, polypectomy;  Surgeon: Lalita Dubose MD;  Location: [facility] OR;  Service:    • D&C HYSTEROSCOPY N/A 2017    Procedure: DILATATION AND CURETTAGE HYSTEROSCOPY;  Surgeon: Dee Morales MD;  Location: [facility] OR;  Service:    • DIAGNOSTIC LAPAROSCOPY Bilateral 2017    Procedure: DIAGNOSTIC LAPAROSCOPY,laproscopic bilateral salpingoophorectomy ;  Surgeon: Dee Morales MD;  Location: [facility] OR;  Service:    • DILATATION AND CURETTAGE     • HAND SURGERY Right    • MASTECTOMY Right 2017    Procedure: right  "total mastectomy, right axillary sentinel lymph node biopsy x2 with immediate reconstruction of right breast with expander.;  Surgeon: Dyan Kinsey MD;  Location: [facility] OR;  Service:    • PELVIC LAPAROSCOPY      BSO   • REDUCTION MAMMAPLASTY       Past Medical History:   Diagnosis Date   • Anxiety    • Asthma    • Breast cancer (CMS/[facility]) 08/2017   • Cancer (CMS/[facility]) 2017    Right   • GERD (gastroesophageal reflux disease) 6/23/2017   • Herpes     + HSv no outbreaks   • Lichen sclerosus    • Lichen sclerosus et atrophicus 6/23/2017   • Migraine    • Polyp of colon, adenomatous        Prior Hospitalizations, other than for surgery or childbirth, and year:  None     Social History     Social History   • Marital status:      Spouse name: N/A   • Number of children: 1   • Years of education: College     Occupational History   •  [employer]     Social History Main Topics   • Smoking status: Former Smoker     Packs/day: 0.50     Years: 10.00     Types: Cigarettes     Quit date: 2007   • Smokeless tobacco: Never Used   • Alcohol use Yes      Comment: Occasional   • Drug use: No   • Sexual activity: Defer     Other Topics Concern   • Not on file     Social History Narrative   • No narrative on file     Patient is .  Patient is employed full time with the following occupation:   drinks caffeine    Family History:  Family History   Problem Relation Age of Onset   • Breast cancer Mother 70   • Hearing loss Mother    • Hyperlipidemia Mother    • Diabetes Father    • Asthma Sister    • Clotting disorder Sister    • COPD Sister    • Pulmonary embolism Sister    • Deep vein thrombosis Brother    • Pulmonary embolism Brother    • Malig Hyperthermia Neg Hx        Vital Signs:  /78   Pulse 85   Ht 154.9 cm (61\")   Wt 87.1 kg (192 lb)   SpO2 95%   BMI 36.28 kg/m²      Medications:    Current Outpatient Prescriptions:   •  anastrozole (ARIMIDEX) 1 MG tablet, take 1 " "tablet by mouth once daily, Disp: 30 tablet, Rfl: 5  •  Calcium Carbonate-Vitamin D (CALCIUM PLUS VITAMIN D PO), Take 1,200 mg by mouth 2 (Two) Times a Day., Disp: , Rfl:   •  fluticasone-salmeterol (ADVAIR) 100-50 MCG/DOSE DISKUS, Inhale 1 puff As Needed (SOB OR WHEEZING)., Disp: , Rfl:   •  Multiple Vitamins-Minerals (MULTIVITAMIN ADULT PO), Take  by mouth., Disp: , Rfl:   •  rosuvastatin (CRESTOR) 5 MG tablet, Take 5 mg by mouth Every Night., Disp: , Rfl:   •  venlafaxine XR (EFFEXOR XR) 75 MG 24 hr capsule, Take 1 capsule by mouth Daily., Disp: 30 capsule, Rfl: 5  •  vitamin D (ERGOCALCIFEROL) 31762 units capsule capsule, take 1 capsule by mouth every week, Disp: 8 capsule, Rfl: 0  •  ENGERIX-B 20 MCG/ML injection, inject 1 milliliter intramuscularly, Disp: , Rfl: 0     Allergies:  Allergies   Allergen Reactions   • Sesame Seed [Sesame Oil] Anaphylaxis     Sunflower seed and the food family of it         Physical Examination:  /78   Pulse 85   Ht 154.9 cm (61\")   Wt 87.1 kg (192 lb)   SpO2 95%   BMI 36.28 kg/m²   General Appearance:  Patient is in no distress.  She is well kept and has an obese build.   Psychiatric:  Patient with appropriate mood and affect. Alert and oriented to self, time, and place.    Breast, RIGHT:  Surgically absent with implant in place. Well healed transverse incision.  No skin nodules or discolorations.    Breast, LEFT:  medium sized, asymmetric with the contralateral surgically absent side.  Breast skin is without erythema, edema, rashes.  There are no visible abnormalities upon inspection during the arm-raising maneuver or with hands on hips in the sitting position. There is no nipple retraction, discharge or nipple/areolar skin changes.There are no masses palpable in the sitting or supine positions. Well healing reduction pattern incisions.    Lymphatic:  There is no axillary, cervical, infraclavicular, or supraclavicular adenopathy bilaterally.  Eyes:  Pupils are round " and reactive to light.  Cardiovascular:  Heart rate and rhythm are regular.  Respiratory:  Lungs are clear bilaterally with no crackles or wheezes in any lung field.  Gastrointestinal:  Abdomen is soft, nondistended, and nontender.   Musculoskeletal:  Good strength in all 4 extremities.   There is good range of motion in both shoulders.    Skin:  No new skin lesions or rashes on the skin excluding the breast (see breast exam above).        Imagin/5/14    SCREENING MAMMOGRAM SYED CLARA  Heterogeneously dense. BIRADS CATEGORY 2: Benign    16   BILATERAL SCREENING MAMMOGRAM SYED, CLARA  Multiple small benign-appearing calcifications bilaterally, more numerous on the right. Unchanged.  BIRADS CATEGORY 2    17  MAMMO SCREENING TOMOSYNTHESIS BILATERAL   SYED, CLARA  The breasts are heterogeneously dense. Suspicious irregular mass axillary portion of the right breast measuring 2 cm. This is a new finding. Ultrasound examination is recommended.  BIRADS CATEGORY 0    17  US BREAST RIGHT SYED CLARA  Suspicious hypoechoic, irregular, shadowing mass at the 10:00 position right breast. Measures at least 1.3 cm.  BI-RADS CATEGORY 5    17  [facility] MRI BREAST BILATERAL   SYED, CLARA   Right breast 10-o’clock 9 cm of the nipple enhancing mass that measures 1.9 cm in the anterior to posterior, 1.7 cm in the superior to inferior and 1.6 cm in the medial to lateral. A metallic clip is located along the posterior margin. No evidence for axillary adenopathy in either axilla. There is short segment linear enhancement that is scattered in both breasts and is below threshold. However, I do not have a recent mammogram for comparison. I recommend that a mammogram obtained in the last 6 months be pronounced for comparison to assess for the resented of any calcifications in the region which could warrant biopsy.   BIRADS Category 0    17  [facility]  ADDENDUM, BILATERAL BREAST  "MRI CLARA LYNCH  There are calcifications seen in an asymmetric distribution between the right and left breast. The preponderance of calcifications in the right breast relative to the left breast can be seen dating back to the prior examinations of 06/04/2014. In the middle third of the right breast at 12 o’clock position these is a cluster if microcalcifications that appears to have slightly increased in number over the prior years. Correlation with a stereotactic guided right breast biopsy of these calcifications is recommended. I recommend that the patient undergo a breast MRI examination in one year and 6 months mammographic follow-up of both breasts.     [facility] left screening mammogram with 3-D: Heterogenous Shankar dense parenchyma.  Mild architectural distortion left breast related to surgery.  BI-RADS 2.      Pathology:  7/3/17     US GUIDED BREAST BIOPSY  CLARA LYNCH  ULTRASOUND-GUIDED CORE NEEDLE BIOPSY, RIGHT. CLIP MARKER PLACEMENT.  Biopsied 4 times 14 gauge Achieve. A clip marker was deployed. Postbiopsy mammogram shows the clip marker along the medial margin of the lesion.  7/3/17  [facility] PATHOLOGY  CLARA LYNCH  A. RIGHT BREAST, CORE BIOPSY: INVASIVE LOBULAR CARCINOMA   0.9 cm. Score I/III (tubules=3, nuclei=1, mitosis=1).  Foci of lobular carcinoma in situ (LCIS) identified. No capillary lymphatic nor perineural invasions identified.  ESTROGEN RECEPTOR          95%  PROGESTERONE RECEPTOR        95%  Her2     0    07/25/17  [facility]     PATHOLOGY  CLARA LYNCH   Final Diagnosis   BREAST, RIGHT, DESIGNATED \"9:30\" CORE BIOPSY:              INVASIVE LOBULAR CARCINOMA WITH ASSOCIATED LOBULAR CARCINOMA IN SITU.              PREDICTED KELLY SCORE: TUBULAR SCORE 3, NUCLEAR SCORE 2, MITOTIC SCORE 1;                       OVERALL GRADE 2 (SCORE 6 OF 9).              MAXIMUM MEASURED LENGTH OF INVASIVE CARCINOMA IN A SINGLE CORE IS 4 MM.              FIBROCYSTIC " CHANGES WITH DUCT DILATATION AND STASIS AND APOCRINE METAPLASIA.               FOCAL FIBROADENOMATOID CHANGE.              FOCAL DUCTAL HYPERPLASIA OF THE USUAL TYPE.      COMMENT: The above diagnosis is supported by e-cadherin immunohistochemistry.  ER, AR, and HER-2/tanya studies will be performed with results to be reported in an addendum.      08/23/17 [facility]  SURGICAL PATHOLOGY  JESSICA SUAZO   Final Diagnosis   1.  SENTINEL LYMPH NODE #1, RIGHT AXILLA, EXCISIONAL SPECIMEN (ONE NODE):                          NO TUMOR IDENTIFIED.     2.  SENTINEL LYMPH NODE #2,  RIGHT AXILLA, EXCISIONAL SPECIMEN (ONE NODE):                         NO TUMOR IDENTIFIED                         MULTIPLE NON-NECROTIZING GRANULOMAS.               NEGATIVE STAINS FOR FUNGAL AND ACID FAST ORGANISMS (with appropriate controls).     3.  RIGHT BREAST, TOTAL MASTECTOMY SPECIMEN:                         IN SITU AND INVASIVE LOBULAR CARCINOMA.                         NEGATIVE MARGINS.                         NO ATTACHED LYMPH NODES OR SKELETAL MUSCLE.                         NEGATIVE SKIN AND NIPPLE.               EXTENSIVE LCIS AND NON-ATYPICAL DUCT HYPERPLASIA.                         (SEE SYNOPTIC REPORT.)     DAYO/brb/th  IHC/a/TDJ           from genetic counseling on Jessica Suazo dated July 19, 2017 at the time of appointment she expressed an interest in pursuing testing and so a Gene DX multi-gene panel was sent.  Results pending.    Procedures:      Assessment:   Diagnosis Plan   1. Malignant neoplasm of upper-outer quadrant of right breast in female, estrogen receptor positive (CMS/HCC)  Mammo Screening Digital Tomosynthesis Bilateral With CAD   2. FH: breast cancer     3. Obesity (BMI 35.0-39.9 without comorbidity)     4. Family history of DVT          1-  RIGHT 10:00 7.5 CFN- on exam 3 x 1.75 cm, on ultrasound 1.3 cm, on MRI 1.9 cm. Normal nodes on exam and MRI.  Inv lobular carcinoma, low grade, 3,1,1, foci LCIS, no LVI, ER  95, NE 95, her 2 tanya 0    MRI terence short segmental enhancement RIGHT- of indeterminate significance--Biopsy results returned as INvasive lobular carcinoma with LCIS, int grade, t3n2m1, max length 4mm. We will have her come back to discuss. I reviewed imagign w Dr Barron again and she will likely need a mastectomy.  Dr. Barron's procedure note is that the recent biopsy and the current biopsy R4 centimeters apart.  The most recent clip is 4 cm inferior and anterior to the previous biopsy site.    Clinical stage [id number]- IA    Pathology from LEFT total mastectomy and LEFT SLNB with immediate reconstruction Dr Waterhouse returned as   In situ and invasive lobular carcinoma, intermediate grade, 3, 2, 1, no duct carcinoma in situ present but lobular carcinoma in situ was present.  Greatest dimension of invasive carcinoma 1.8 cm.  Margins are uninvolved with closest margin greater than 1 cm.  0 of 2 lymph nodes involved. Extensive non-atypical duct hyperplasia and LCIS in the mastectomy specimen.   Pathologic stage TI CN 0.      Oncotype DX score dated July 25, 2017 returned as low risk score of 12 with a 10 year risk of distant recurrence on tamoxifen alone of 8%  - will not need xrt  - Dr Morales BSO 9-2017 ( bc of FH clotting and couldn't take tamoxifen for DVT risk)  - Dr Gallegos- arimidex 10-10-17  - 12-15-17 Dr Waterhouse RIGHT implant exchange and LEFT mastopexy      2-  Mom age 70  Genetic counseling on Jessica Suazo dated July 19, 2017-Gene DX multi-gene panel was sent. Negative for mutation.    3-  BMI 37    4-  2 brothers and one sister with DVT and/or PE  Patient's and her siblings hypercoag workup normal- Dr Gallegos    Plan:  Mrs. Suazo is doing well today at her one year FU.  We reviewed her interval history, exam and imaging and imaging report together today.    Her exam and imaging are in good order.  She has lost 5# and I saluted her on this effort.    We reviewed her surveillance.    I plan to see her  back in one year after a LEFT screen mammogram at [facility].  I will arrange that for her.        I will schedule a left screening mammogram at [facility] with 3-D for June26, 2019 and see her back after.  I asked her call me in the interim with any concerns or changes and we'd be happy to see her sooner.              Dyan Kinsey MD      We spent 15 min in visit, 9 in face to face       Next Appointment:  Return for Next scheduled follow up, after imaging.      EMR Dragon/transcription disclaimer:    Much of this encounter note is an electronic transcription/translocation of spoken language to printed text.  The electronic translation of spoken language may permit erroneous, or at times, nonsensical words or phrases to be inadvertently transcribed.  Although I have reviewed the note from such areas, some may still exist.

## 2018-08-30 ENCOUNTER — OFFICE VISIT (OUTPATIENT)
Dept: OBSTETRICS AND GYNECOLOGY | Facility: CLINIC | Age: 53
End: 2018-08-30

## 2018-08-30 VITALS
SYSTOLIC BLOOD PRESSURE: 132 MMHG | BODY MASS INDEX: 36.63 KG/M2 | DIASTOLIC BLOOD PRESSURE: 86 MMHG | WEIGHT: 194 LBS | HEIGHT: 61 IN

## 2018-08-30 DIAGNOSIS — Z17.0 MALIGNANT NEOPLASM OF UPPER-OUTER QUADRANT OF RIGHT BREAST IN FEMALE, ESTROGEN RECEPTOR POSITIVE (HCC): ICD-10-CM

## 2018-08-30 DIAGNOSIS — L90.0 LICHEN SCLEROSUS ET ATROPHICUS: ICD-10-CM

## 2018-08-30 DIAGNOSIS — C50.411 MALIGNANT NEOPLASM OF UPPER-OUTER QUADRANT OF RIGHT BREAST IN FEMALE, ESTROGEN RECEPTOR POSITIVE (HCC): ICD-10-CM

## 2018-08-30 DIAGNOSIS — Z13.9 SCREENING FOR CONDITION: Primary | ICD-10-CM

## 2018-08-30 DIAGNOSIS — Z01.419 PAP SMEAR, LOW-RISK: ICD-10-CM

## 2018-08-30 LAB
BILIRUB BLD-MCNC: NEGATIVE MG/DL
CLARITY, POC: CLEAR
COLOR UR: YELLOW
GLUCOSE UR STRIP-MCNC: NEGATIVE MG/DL
KETONES UR QL: NEGATIVE
LEUKOCYTE EST, POC: NEGATIVE
NITRITE UR-MCNC: NEGATIVE MG/ML
PH UR: 5 [PH] (ref 5–8)
PROT UR STRIP-MCNC: NEGATIVE MG/DL
RBC # UR STRIP: NEGATIVE /UL
SP GR UR: 1.02 (ref 1–1.03)
UROBILINOGEN UR QL: NORMAL

## 2018-08-30 PROCEDURE — 81002 URINALYSIS NONAUTO W/O SCOPE: CPT | Performed by: OBSTETRICS & GYNECOLOGY

## 2018-08-30 PROCEDURE — 99396 PREV VISIT EST AGE 40-64: CPT | Performed by: OBSTETRICS & GYNECOLOGY

## 2018-09-20 RX ORDER — VENLAFAXINE HYDROCHLORIDE 75 MG/1
CAPSULE, EXTENDED RELEASE ORAL
Qty: 30 CAPSULE | Refills: 5 | Status: SHIPPED | OUTPATIENT
Start: 2018-09-20

## 2018-09-21 ENCOUNTER — TELEPHONE (OUTPATIENT)
Dept: ONCOLOGY | Facility: HOSPITAL | Age: 53
End: 2018-09-21

## 2018-09-21 RX ORDER — ANASTROZOLE 1 MG/1
1 TABLET ORAL DAILY
Qty: 30 TABLET | Refills: 5 | Status: SHIPPED | OUTPATIENT
Start: 2018-09-21 | End: 2019-05-14 | Stop reason: SDUPTHER

## 2018-09-21 NOTE — TELEPHONE ENCOUNTER
All scripts sent to pharmacy as requested.     ----- Message from Estella Dickson sent at 9/21/2018  9:47 AM EDT -----  [phone]  Re; Needs script refilled, without it 4 days now. [facility] said they have not gotten an answer.

## 2018-10-02 ENCOUNTER — LAB (OUTPATIENT)
Dept: LAB | Facility: HOSPITAL | Age: 53
End: 2018-10-02

## 2018-10-02 ENCOUNTER — OFFICE VISIT (OUTPATIENT)
Dept: ONCOLOGY | Facility: CLINIC | Age: 53
End: 2018-10-02

## 2018-10-02 VITALS
OXYGEN SATURATION: 98 % | RESPIRATION RATE: 16 BRPM | TEMPERATURE: 98 F | DIASTOLIC BLOOD PRESSURE: 70 MMHG | SYSTOLIC BLOOD PRESSURE: 120 MMHG | WEIGHT: 195.2 LBS | HEART RATE: 88 BPM | HEIGHT: 61 IN | BODY MASS INDEX: 36.85 KG/M2

## 2018-10-02 DIAGNOSIS — C50.411 MALIGNANT NEOPLASM OF UPPER-OUTER QUADRANT OF RIGHT BREAST IN FEMALE, ESTROGEN RECEPTOR POSITIVE (HCC): Primary | ICD-10-CM

## 2018-10-02 DIAGNOSIS — Z17.0 MALIGNANT NEOPLASM OF UPPER-OUTER QUADRANT OF RIGHT BREAST IN FEMALE, ESTROGEN RECEPTOR POSITIVE (HCC): ICD-10-CM

## 2018-10-02 DIAGNOSIS — J40 BRONCHITIS: ICD-10-CM

## 2018-10-02 DIAGNOSIS — C50.411 MALIGNANT NEOPLASM OF UPPER-OUTER QUADRANT OF RIGHT BREAST IN FEMALE, ESTROGEN RECEPTOR POSITIVE (HCC): ICD-10-CM

## 2018-10-02 DIAGNOSIS — E55.9 VITAMIN D DEFICIENCY DISEASE: ICD-10-CM

## 2018-10-02 DIAGNOSIS — Z17.0 MALIGNANT NEOPLASM OF UPPER-OUTER QUADRANT OF RIGHT BREAST IN FEMALE, ESTROGEN RECEPTOR POSITIVE (HCC): Primary | ICD-10-CM

## 2018-10-02 LAB
ALBUMIN SERPL-MCNC: 4.4 G/DL (ref 3.5–5.2)
ALBUMIN/GLOB SERPL: 1.6 G/DL (ref 1.1–2.4)
ALP SERPL-CCNC: 110 U/L (ref 38–116)
ALT SERPL W P-5'-P-CCNC: 13 U/L (ref 0–33)
ANION GAP SERPL CALCULATED.3IONS-SCNC: 10 MMOL/L
AST SERPL-CCNC: 16 U/L (ref 0–32)
BASOPHILS # BLD AUTO: 0.07 10*3/MM3 (ref 0–0.1)
BASOPHILS NFR BLD AUTO: 0.8 % (ref 0–1.1)
BILIRUB SERPL-MCNC: 0.8 MG/DL (ref 0.1–1.2)
BUN BLD-MCNC: 18 MG/DL (ref 6–20)
BUN/CREAT SERPL: 27.7 (ref 7.3–30)
CALCIUM SPEC-SCNC: 9 MG/DL (ref 8.5–10.2)
CHLORIDE SERPL-SCNC: 100 MMOL/L (ref 98–107)
CO2 SERPL-SCNC: 30 MMOL/L (ref 22–29)
CREAT BLD-MCNC: 0.65 MG/DL (ref 0.6–1.1)
DEPRECATED RDW RBC AUTO: 44.3 FL (ref 37–49)
EOSINOPHIL # BLD AUTO: 0.29 10*3/MM3 (ref 0–0.36)
EOSINOPHIL NFR BLD AUTO: 3.5 % (ref 1–5)
ERYTHROCYTE [DISTWIDTH] IN BLOOD BY AUTOMATED COUNT: 13.6 % (ref 11.7–14.5)
GFR SERPL CREATININE-BSD FRML MDRD: 96 ML/MIN/1.73
GLOBULIN UR ELPH-MCNC: 2.7 GM/DL (ref 1.8–3.5)
GLUCOSE BLD-MCNC: 137 MG/DL (ref 74–124)
HCT VFR BLD AUTO: 44.5 % (ref 34–45)
HGB BLD-MCNC: 14.3 G/DL (ref 11.5–14.9)
IMM GRANULOCYTES # BLD: 0.06 10*3/MM3 (ref 0–0.03)
IMM GRANULOCYTES NFR BLD: 0.7 % (ref 0–0.5)
LYMPHOCYTES # BLD AUTO: 3.14 10*3/MM3 (ref 1–3.5)
LYMPHOCYTES NFR BLD AUTO: 37.7 % (ref 20–49)
MCH RBC QN AUTO: 28.9 PG (ref 27–33)
MCHC RBC AUTO-ENTMCNC: 32.1 G/DL (ref 32–35)
MCV RBC AUTO: 89.9 FL (ref 83–97)
MONOCYTES # BLD AUTO: 0.71 10*3/MM3 (ref 0.25–0.8)
MONOCYTES NFR BLD AUTO: 8.5 % (ref 4–12)
NEUTROPHILS # BLD AUTO: 4.06 10*3/MM3 (ref 1.5–7)
NEUTROPHILS NFR BLD AUTO: 48.8 % (ref 39–75)
NRBC BLD MANUAL-RTO: 0 /100 WBC (ref 0–0)
PLATELET # BLD AUTO: 307 10*3/MM3 (ref 150–375)
PMV BLD AUTO: 9.2 FL (ref 8.9–12.1)
POTASSIUM BLD-SCNC: 4 MMOL/L (ref 3.5–4.7)
PROT SERPL-MCNC: 7.1 G/DL (ref 6.3–8)
RBC # BLD AUTO: 4.95 10*6/MM3 (ref 3.9–5)
SODIUM BLD-SCNC: 140 MMOL/L (ref 134–145)
WBC NRBC COR # BLD: 8.33 10*3/MM3 (ref 4–10)

## 2018-10-02 PROCEDURE — 36415 COLL VENOUS BLD VENIPUNCTURE: CPT | Performed by: INTERNAL MEDICINE

## 2018-10-02 PROCEDURE — 80053 COMPREHEN METABOLIC PANEL: CPT | Performed by: INTERNAL MEDICINE

## 2018-10-02 PROCEDURE — 85025 COMPLETE CBC W/AUTO DIFF WBC: CPT | Performed by: INTERNAL MEDICINE

## 2018-10-02 PROCEDURE — 99214 OFFICE O/P EST MOD 30 MIN: CPT | Performed by: INTERNAL MEDICINE

## 2018-10-02 NOTE — PROGRESS NOTES
Subjective .     REASONS FOR FOLLOWUP:    1. Stage IA ([id number]) right breast cancer: 1.8 cm grade 2 invasive lobular carcinoma, ER positive (95%, MO positive (95%), HER-2/tanya negative (IHC 0), sentinel lymph node negative ×2.  Status post right mastectomy 8/23/17.  Oncotype DX recurrence score 12 (low risk).  2. Genetics evaluation 7/19/17 with negative Gene DX panel test.  3. Strong family history of thrombosis with no detectable familial hypercoagulable factor.  No personal history of thrombosis.  Hypercoagulable evaluation negative 9/24/15, additional negative evaluation 8/3/17.  Continues chronically on aspirin 81 mg daily.  4. Status post bilateral salpingo-oophorectomy 9/26/17 achieving surgical postmenopausal state.  5. Osteopenia with Baseline DEXA scan 9/21/17 showing maximal T score -1.2 left femoral neck.  6. Vitamin D deficiency, treated with high-dose vitamin D 50,000 units weekly ×8 weeks with subsequent transition back to 2400 units daily 5/3/18.  7. Initiation of adjuvant Arimidex 1 mg daily 10/10/17.  8. Postmenopausal hot flashes, initiated Effexor XR 37.5 mg daily 10/10/17.  Dose increased to 75 mg daily on 2/2/18.    HISTORY OF PRESENT ILLNESS:  The patient is a 52 y.o. year old female who is here for follow-up with the above-mentioned history.    History of Present Illness the patient returns today in follow-up continuing on treatment with Arimidex.  She is also continuing on Effexor XR 75 mg daily for hot flashes.  She notes that her hot flashes are somewhat improved on this dose and are tolerable.  She has some minor arthritic complaints with osteoarthritic pain in her knees however this is tolerable as well.  She does note that her visit has been delayed as she was caring for her mother was diagnosed with colon cancer at age 77 a few months ago.  The patient herself underwent colonoscopy last year with polyps identified and is going to undergo a one year follow-up with Dr. Tom in November.   The patient's bowel function is currently normal.  She did undergo her annual mammogram on 18, was BI-RADS 2.  She is undergoing reconstructive surgery with Dr. Waterhouse in January.      Past Medical History:   Diagnosis Date   • Anxiety    • Asthma    • Breast cancer (CMS/HCC) 2017   • Cancer (CMS/HCC) 2017    Right   • GERD (gastroesophageal reflux disease) 2017   • Herpes     + HSv no outbreaks   • Lichen sclerosus    • Lichen sclerosus et atrophicus 2017   • Migraine    • Osteopenia    • Polyp of colon, adenomatous      Past Surgical History:   Procedure Laterality Date   • BILATERAL BREAST REDUCTION Left 12/15/2017    Procedure: LEFT BREAST REDUCTION ;  Surgeon: Maurine Waterhouse, MD;  Location: [facility] OR;  Service:    • BREAST BIOPSY Right 2017   • BREAST RECONSTRUCTION Right 2017    Procedure: RT TISSUE EXPANDER W/ ALLODERM;  Surgeon: Maurine Waterhouse, MD;  Location: [facility] OR;  Service:    • BREAST TISSUE EXPANDER REMOVAL INSERTION OF IMPLANT Right 12/15/2017    Procedure: RIGHT BREAST TISSUE EXPANDER REMOVAL INSERTION OF IMPLANT;  Surgeon: Maurine Waterhouse, MD;  Location: [facility] OR;  Service:    •  SECTION     • COLONOSCOPY N/A 2017    Procedure: COLONOSCOPY, polypectomy;  Surgeon: Lalita Dubose MD;  Location: [facility] OR;  Service:    • D&C HYSTEROSCOPY N/A 2017    Procedure: DILATATION AND CURETTAGE HYSTEROSCOPY;  Surgeon: Dee Morales MD;  Location: [facility] OR;  Service:    • DIAGNOSTIC LAPAROSCOPY Bilateral 2017    Procedure: DIAGNOSTIC LAPAROSCOPY,laproscopic bilateral salpingoophorectomy ;  Surgeon: Dee Morales MD;  Location: [facility];  Service:    • DILATATION AND CURETTAGE     • HAND SURGERY Right    • MASTECTOMY Right 2017    Procedure: right total mastectomy, right axillary sentinel lymph node biopsy x2 with immediate reconstruction of right breast with expander.;  Surgeon: Dyan Kinsey MD;   Location: [facility] MAIN OR;  Service:    • PELVIC LAPAROSCOPY      BSO   • REDUCTION MAMMAPLASTY         ONCOLOGIC HISTORY:  (History from previous dates can be found in the separate document.)    Current Outpatient Prescriptions on File Prior to Visit   Medication Sig Dispense Refill   • anastrozole (ARIMIDEX) 1 MG tablet Take 1 tablet by mouth Daily. 30 tablet 5   • Calcium Carbonate-Vitamin D (CALCIUM PLUS VITAMIN D PO) Take 1,200 mg by mouth 2 (Two) Times a Day.     • ENGERIX-B 20 MCG/ML injection inject 1 milliliter intramuscularly  0   • fluticasone-salmeterol (ADVAIR) 100-50 MCG/DOSE DISKUS Inhale 1 puff As Needed (SOB OR WHEEZING).     • Multiple Vitamins-Minerals (MULTIVITAMIN ADULT PO) Take  by mouth.     • rosuvastatin (CRESTOR) 5 MG tablet Take 5 mg by mouth Every Night.     • venlafaxine XR (EFFEXOR-XR) 75 MG 24 hr capsule take 1 capsule by mouth once daily 30 capsule 5   • vitamin D (ERGOCALCIFEROL) 90858 units capsule capsule take 1 capsule by mouth every week 8 capsule 0     No current facility-administered medications on file prior to visit.        ALLERGIES:     Allergies   Allergen Reactions   • Sesame Seed [Sesame Oil] Anaphylaxis     Sunflower seed and the food family of it     • Tramadol Other (See Comments)     Social History     Social History   • Marital status:      Spouse name: N/A   • Number of children: 1   • Years of education: College     Occupational History   •  [employer]     Social History Main Topics   • Smoking status: Former Smoker     Packs/day: 0.50     Years: 10.00     Types: Cigarettes     Quit date: 2007   • Smokeless tobacco: Never Used   • Alcohol use Yes      Comment: Occasional   • Drug use: No   • Sexual activity: Defer      Comment: s/p BSO     Other Topics Concern   • Not on file     Social History Narrative   • No narrative on file     Family History   Problem Relation Age of Onset   • Breast cancer Mother 70   • Hearing loss Mother    •  Hyperlipidemia Mother    • Melanoma Mother    • Diabetes Father    • Asthma Sister    • Clotting disorder Sister    • COPD Sister    • Pulmonary embolism Sister    • Deep vein thrombosis Brother    • Pulmonary embolism Brother    • Malig Hyperthermia Neg Hx    • Ovarian cancer Neg Hx         Review of Systems   Constitutional: Negative for activity change, appetite change, fatigue, fever and unexpected weight change.   HENT: Negative for congestion, mouth sores, nosebleeds, sore throat and voice change.    Respiratory: Negative for cough, shortness of breath and wheezing.    Cardiovascular: Negative for chest pain, palpitations and leg swelling.   Gastrointestinal: Negative for abdominal distention, abdominal pain, blood in stool, constipation, diarrhea, nausea and vomiting.   Endocrine: Positive for heat intolerance. Negative for cold intolerance.   Genitourinary: Negative for difficulty urinating, dysuria, frequency, hematuria and pelvic pain.   Musculoskeletal: Positive for arthralgias. Negative for back pain, joint swelling and myalgias.   Skin: Negative for wound.   Neurological: Negative for dizziness, syncope, weakness, light-headedness, numbness and headaches.   Hematological: Negative for adenopathy. Does not bruise/bleed easily.   Psychiatric/Behavioral: Negative for confusion and sleep disturbance. The patient is not nervous/anxious.          Objective      Vitals:    10/02/18 1633   BP: 120/70   Pulse: 88   Resp: 16   Temp: 98 °F (36.7 °C)   SpO2: 98%      Current Status 10/2/2018   ECOG score 0   Pain 0/10    Physical Exam   Constitutional: She is oriented to person, place, and time. She appears well-developed and well-nourished.   HENT:   Mouth/Throat: Oropharynx is clear and moist.   Eyes: Conjunctivae are normal.   Neck: No thyromegaly present.   Cardiovascular: Normal rate and regular rhythm.  Exam reveals no gallop and no friction rub.    No murmur heard.  Pulmonary/Chest: Breath sounds normal. No  respiratory distress.   Right mastectomy with implant reconstruction.  No masses no abnormalities identified.  On the left, status post reduction surgery with no masses no abnormalities palpated.   Abdominal: Soft. Bowel sounds are normal. She exhibits no distension. There is no tenderness.   Musculoskeletal: She exhibits no edema.   Lymphadenopathy:        Head (right side): No submandibular adenopathy present.     She has no cervical adenopathy.     She has no axillary adenopathy.        Right: No inguinal and no supraclavicular adenopathy present.        Left: No inguinal and no supraclavicular adenopathy present.   Neurological: She is alert and oriented to person, place, and time. She displays normal reflexes. No cranial nerve deficit. She exhibits normal muscle tone.   Skin: Skin is warm and dry. No rash noted.   Psychiatric: She has a normal mood and affect. Her behavior is normal.       RECENT LABS:  Hematology WBC   Date Value Ref Range Status   10/02/2018 8.33 4.00 - 10.00 10*3/mm3 Final     RBC   Date Value Ref Range Status   10/02/2018 4.95 3.90 - 5.00 10*6/mm3 Final     Hemoglobin   Date Value Ref Range Status   10/02/2018 14.3 11.5 - 14.9 g/dL Final     Hematocrit   Date Value Ref Range Status   10/02/2018 44.5 34.0 - 45.0 % Final     Platelets   Date Value Ref Range Status   10/02/2018 307 150 - 375 10*3/mm3 Final        Lab Results   Component Value Date    GLUCOSE 136 (H) 05/02/2018    BUN 13 05/02/2018    CREATININE 0.65 05/02/2018    EGFRIFNONA 96 05/02/2018    BCR 20.0 05/02/2018    K 4.3 05/02/2018    CO2 28.6 05/02/2018    CALCIUM 9.7 05/02/2018    ALBUMIN 4.40 05/02/2018    AST 19 05/02/2018    ALT 16 05/02/2018         Assessment/Plan      1. Pathologic stage IA ([id number]) right breast cancer: The patient had a mammographically detected nonpalpable abnormality of the right breast measuring 1.3 cm by ultrasound in the 10 o'clock position with biopsy 7/3/17 showing an invasive lobular carcinoma  largest size measuring 9 mm, grade 1, ER positive (95%), WY positive (95%), HER-2/tanya 0 (IHC).  There were foci of associated LCIS.  There was no lymphatic nor perineural invasion.  Oncotype DX testing was sent revealing a recurrent score in a low risk range at 12 (10 year recurrence risk of 8% with treatment with tamoxifen, 1% benefit from adjuvant chemotherapy) and confirmed ER positive, WY positive, HER-2/tanya negative.  MRI of the breasts 7/13/17 showed the biopsy-proven right breast malignancy measuring 1.9 cm with no axillary lymph node enlargement.  There was however in the middle third of the right breast a cluster of microcalcifications that appear to have increased over time.  An MRI guided biopsy of this area was performed 7/25/17 in the 9:30 position revealing an invasive lobular carcinoma, 4 mm, grade 2, ER positive (greater than 90%), WY positive (90%), HER-2/tanya 1+ recurrent sees IHC) with associated LCIS.  The patient was seen in the genetics clinic and underwent Gene DX panel testing 7/19/17 which was negative.  She has a family history of malignancy with breast cancer in her mother at age 70.  The patient saw Dr. Kinsey who discussed surgical management of her disease.  With evidence of multifocal involvement of the breast, it was recommended to undergo a right mastectomy with sentinel lymph node biopsy.  She did see Dr. Waterhouse as well in regards to reconstruction.    The patient was seen in initial consultation on 8/2/17. Oncotype DX score was in the low risk category at 12 indicating a 10 year recurrence risk of distant metastasis of 8% with tamoxifen alone.  We discussed that adjuvant chemotherapy would provide only approximately a 1% additional benefit.  It was not recommended for her to receive adjuvant chemotherapy.      Right mastectomy with implant reconstruction and sentinel lymph node biopsy performed on 8/23/17.  Pathology showed a 1.8 cm grade 2 invasive lobular carcinoma with  negative margins (greater than 1 cm), extensive associated LCIS and non-atypical ductal hyperplasia, sentinel lymph node negative ×2 with evidence of multiple noncaseating granulomas.  Therefore, the patient had Stage IA ([id number]) disease.  Again, it was not recommended for her to receive adjuvant chemotherapy given her low risk Oncotype score.  This would provide only an additional 1% benefit.  We discussed pursuing adjuvant endocrine therapy.  As she was premenopausal, this would typically be undertaken with tamoxifen.  There was concern however given the patient's significant family history of thrombosis occurring at a young age with no identifiable/traceable risk factor.  Tamoxifen would confer a significant additional risk for her although she has not experienced a personal history of thrombosis.  Also, we discussed that there is suggestion from subgroup analysis of the BIG 1-98 study to suggest a significant additional benefit of aromatase inhibitor therapy over tamoxifen for invasive lobular cancer.  Therefore, the patient underwent bilateral salpingo-oophorectomy 9/26/17 (pathology benign) producing surgical postmenopausal state. Baseline DEXA scan showed mild osteopenia 9/21/17 with a T score in the left femoral neck -1.2.  On 10/10/17, initiated Arimidex 1 mg daily and Effexor XRT 37.5 mg daily.    Right expander removal and implant placement as well as left breast reduction performed 12/15/17.    Significant hot flashes prompted increase in Effexor dose to 75 mg daily 2/2/18.    The patient returns in follow-up today continuing on adjuvant Arimidex.  She continues to tolerate treatment extremely well.  She is experiencing some minor symptoms in her knees.  Hot flashes are tolerable currently on Effexor XR 75 mg daily.  She did undergo her left-sided inguinal mammogram on 6/25/18 which was negative, BI-RADS 2.  She is scheduled apparently for a reconstructive procedure with Dr. Waterhouse in January.  She  has no clinical evidence of recurrent disease.  I will see her back in 6 months for routine follow-up.  2. Family history of thrombosis: The patient has no personal history of thrombosis.  She has a significant family history of thrombosis however with a brother who developed a pulmonary embolism in his 30s, a brother developed a DVT at age 44, and a sister with a pulmonary embolism at age 55, all unprovoked.  The patient herself did undergo a hypercoagulable evaluation performed by her primary care physician [facility] 9/24/15 which showed negative prothrombin gene mutation analysis, negative factor V Leiden gene mutation analysis, negative lupus anticoagulant, negative anticardiolipin antibodies, normal homocystine, protein C activity 168%, protein S free 107%.   we did perform additional hypercoagulable labs from 8/3/17 showing protein S free antigen 109%, protein S total antigen 125%, protein S activity 83%, antithrombin III activity 96%, beta-2 GP 1 antibody panel negative.  Therefore, the patient has no detectable underlying hypercoagulable state.  None of her family members however had a detectable risk factor either.  This does produce some uncertainty in regards to her risk with tamoxifen.  Therefore was elected to proceed with aromatase inhibitor therapy instead as she became surgically postmenopausal.  She does continue on aspirin 81 mg a day.    3. Osteopenia: The patient had a baseline DEXA scan from 9/21/17 showing mild osteopenia, maximal T score -1.2 and left femoral neck.  Anticipate a 2 year interval follow-up study on aromatase inhibitor therapy.  4. Vitamin D deficiency: 25 hydroxy vitamin D level on 10/10/17 was very low at 17.8.  The patient was asked to increase vitamin D supplementation to 2400 units daily.  Vitamin D level was still low at 26.9 on 2/2/18.  High dose vitamin D prescribed 50,000 units weekly ×8 weeks and then returned to her previous dose 2400 units daily.   Follow-up 25-hydroxy vitamin D level on 5/2/18 was 46.4.  5. Postmenopausal hot flashes: The patient was experiencing these postoperatively from her bilateral salpingo-oophorectomy.  With initiation of adjuvant aromatase inhibitor therapy on 10/10/17, it was elected to also currently begin Effexor XR 37.5 mg daily.  Unfortunately this did not adequately control her symptoms and Effexor dose was increased to 75 mg daily 2/2/18.  This has been effective with tolerable hot flashes currently and we will maintain the current dose.  6. Health maintenance: The patient underwent a screening colonoscopy on 11/7/17 at age 52.  2 polyps were removed, benign adenomas.  The patient's mother was subsequently diagnosed with colon cancer in 2018 at age 77.  We did review her previous genetic test profile which did include mismatch repair genes and was negative.  It was requested by Dr. Tom that she undergo a one year follow-up colonoscopy which will be scheduled in November 2018.  The patient currently has normal bowel function.     Plan:    1. Continue adjuvant Arimidex 1 mg daily  2. Continue Effexor XR dose to 75 mg daily  3. Continue aspirin 81 mg daily  4. Continue vitamin D 2400 units daily.   5. Proceed with additional reconstructive surgery with Dr. Waterhouse as scheduled in January 2019  6. M.D. visit in 6 months with CBC, CMP.

## 2019-01-07 ENCOUNTER — TELEPHONE (OUTPATIENT)
Dept: OBSTETRICS AND GYNECOLOGY | Facility: CLINIC | Age: 54
End: 2019-01-07

## 2019-01-07 RX ORDER — CLOBETASOL PROPIONATE 0.5 MG/G
OINTMENT TOPICAL
Qty: 60 G | Refills: 2 | Status: SHIPPED | OUTPATIENT
Start: 2019-01-07 | End: 2019-01-08 | Stop reason: SDUPTHER

## 2019-01-08 ENCOUNTER — TELEPHONE (OUTPATIENT)
Dept: OBSTETRICS AND GYNECOLOGY | Facility: CLINIC | Age: 54
End: 2019-01-08

## 2019-01-08 RX ORDER — CLOBETASOL PROPIONATE 0.5 MG/G
OINTMENT TOPICAL
Qty: 30 G | Refills: 2 | Status: SHIPPED | OUTPATIENT
Start: 2019-01-08 | End: 2020-05-14 | Stop reason: SDUPTHER

## 2019-03-19 ENCOUNTER — OFFICE VISIT (OUTPATIENT)
Dept: ONCOLOGY | Facility: CLINIC | Age: 54
End: 2019-03-19

## 2019-03-19 ENCOUNTER — LAB (OUTPATIENT)
Dept: LAB | Facility: HOSPITAL | Age: 54
End: 2019-03-19

## 2019-03-19 VITALS
HEART RATE: 80 BPM | RESPIRATION RATE: 16 BRPM | SYSTOLIC BLOOD PRESSURE: 116 MMHG | WEIGHT: 191.7 LBS | HEIGHT: 61 IN | BODY MASS INDEX: 36.19 KG/M2 | DIASTOLIC BLOOD PRESSURE: 76 MMHG | TEMPERATURE: 97.7 F | OXYGEN SATURATION: 97 %

## 2019-03-19 DIAGNOSIS — C50.411 MALIGNANT NEOPLASM OF UPPER-OUTER QUADRANT OF RIGHT BREAST IN FEMALE, ESTROGEN RECEPTOR POSITIVE (HCC): ICD-10-CM

## 2019-03-19 DIAGNOSIS — Z17.0 MALIGNANT NEOPLASM OF UPPER-OUTER QUADRANT OF RIGHT BREAST IN FEMALE, ESTROGEN RECEPTOR POSITIVE (HCC): ICD-10-CM

## 2019-03-19 DIAGNOSIS — M85.852 OSTEOPENIA OF LEFT THIGH: ICD-10-CM

## 2019-03-19 DIAGNOSIS — E55.9 VITAMIN D DEFICIENCY DISEASE: Primary | ICD-10-CM

## 2019-03-19 LAB
25(OH)D3 SERPL-MCNC: 37.3 NG/ML (ref 30–100)
ALBUMIN SERPL-MCNC: 4.5 G/DL (ref 3.5–5.2)
ALBUMIN/GLOB SERPL: 1.4 G/DL (ref 1.1–2.4)
ALP SERPL-CCNC: 141 U/L (ref 38–116)
ALT SERPL W P-5'-P-CCNC: 17 U/L (ref 0–33)
ANION GAP SERPL CALCULATED.3IONS-SCNC: 10.6 MMOL/L
AST SERPL-CCNC: 16 U/L (ref 0–32)
BASOPHILS # BLD AUTO: 0.1 10*3/MM3 (ref 0–0.2)
BASOPHILS NFR BLD AUTO: 1 % (ref 0–1.5)
BILIRUB SERPL-MCNC: 0.7 MG/DL (ref 0.2–1.2)
BUN BLD-MCNC: 20 MG/DL (ref 6–20)
BUN/CREAT SERPL: 28.6 (ref 7.3–30)
CALCIUM SPEC-SCNC: 9.5 MG/DL (ref 8.5–10.2)
CHLORIDE SERPL-SCNC: 101 MMOL/L (ref 98–107)
CO2 SERPL-SCNC: 26.4 MMOL/L (ref 22–29)
CREAT BLD-MCNC: 0.7 MG/DL (ref 0.6–1.1)
DEPRECATED RDW RBC AUTO: 40.7 FL (ref 37–54)
EOSINOPHIL # BLD AUTO: 0.32 10*3/MM3 (ref 0–0.4)
EOSINOPHIL NFR BLD AUTO: 3.4 % (ref 0.3–6.2)
ERYTHROCYTE [DISTWIDTH] IN BLOOD BY AUTOMATED COUNT: 12.3 % (ref 12.3–15.4)
GFR SERPL CREATININE-BSD FRML MDRD: 88 ML/MIN/1.73
GLOBULIN UR ELPH-MCNC: 3.3 GM/DL (ref 1.8–3.5)
GLUCOSE BLD-MCNC: 134 MG/DL (ref 74–124)
HCT VFR BLD AUTO: 46.7 % (ref 34–46.6)
HGB BLD-MCNC: 15 G/DL (ref 12–15.9)
IMM GRANULOCYTES # BLD AUTO: 0.04 10*3/MM3 (ref 0–0.05)
IMM GRANULOCYTES NFR BLD AUTO: 0.4 % (ref 0–0.5)
LYMPHOCYTES # BLD AUTO: 3.01 10*3/MM3 (ref 0.7–3.1)
LYMPHOCYTES NFR BLD AUTO: 31.5 % (ref 19.6–45.3)
MCH RBC QN AUTO: 28.7 PG (ref 26.6–33)
MCHC RBC AUTO-ENTMCNC: 32.1 G/DL (ref 31.5–35.7)
MCV RBC AUTO: 89.5 FL (ref 79–97)
MONOCYTES # BLD AUTO: 0.72 10*3/MM3 (ref 0.1–0.9)
MONOCYTES NFR BLD AUTO: 7.5 % (ref 5–12)
NEUTROPHILS # BLD AUTO: 5.36 10*3/MM3 (ref 1.4–7)
NEUTROPHILS NFR BLD AUTO: 56.2 % (ref 42.7–76)
NRBC BLD AUTO-RTO: 0 /100 WBC (ref 0–0)
PLATELET # BLD AUTO: 316 10*3/MM3 (ref 140–450)
PMV BLD AUTO: 9.5 FL (ref 6–12)
POTASSIUM BLD-SCNC: 4.5 MMOL/L (ref 3.5–4.7)
PROT SERPL-MCNC: 7.8 G/DL (ref 6.3–8)
RBC # BLD AUTO: 5.22 10*6/MM3 (ref 3.77–5.28)
SODIUM BLD-SCNC: 138 MMOL/L (ref 134–145)
WBC NRBC COR # BLD: 9.55 10*3/MM3 (ref 3.4–10.8)

## 2019-03-19 PROCEDURE — 36415 COLL VENOUS BLD VENIPUNCTURE: CPT | Performed by: INTERNAL MEDICINE

## 2019-03-19 PROCEDURE — 82306 VITAMIN D 25 HYDROXY: CPT | Performed by: INTERNAL MEDICINE

## 2019-03-19 PROCEDURE — 80053 COMPREHEN METABOLIC PANEL: CPT | Performed by: INTERNAL MEDICINE

## 2019-03-19 PROCEDURE — 99214 OFFICE O/P EST MOD 30 MIN: CPT | Performed by: INTERNAL MEDICINE

## 2019-03-19 PROCEDURE — 85025 COMPLETE CBC W/AUTO DIFF WBC: CPT | Performed by: INTERNAL MEDICINE

## 2019-03-19 NOTE — PROGRESS NOTES
Subjective .     REASONS FOR FOLLOWUP:    1. Stage IA ([id number]) right breast cancer: 1.8 cm grade 2 invasive lobular carcinoma, ER positive (95%, MD positive (95%), HER-2/tanya negative (IHC 0), sentinel lymph node negative ×2.  Status post right mastectomy 8/23/17.  Oncotype DX recurrence score 12 (low risk).  2. Genetics evaluation 7/19/17 with negative Gene DX panel test.  3. Strong family history of thrombosis with no detectable familial hypercoagulable factor.  No personal history of thrombosis.  Hypercoagulable evaluation negative 9/24/15, additional negative evaluation 8/3/17.  Continues chronically on aspirin 81 mg daily.  4. Status post bilateral salpingo-oophorectomy 9/26/17 achieving surgical postmenopausal state.  5. Osteopenia with Baseline DEXA scan 9/21/17 showing maximal T score -1.2 left femoral neck.  6. Vitamin D deficiency, treated with high-dose vitamin D 50,000 units weekly ×8 weeks with subsequent transition back to 2400 units daily 5/3/18.  7. Initiation of adjuvant Arimidex 1 mg daily 10/10/17.  8. Postmenopausal hot flashes, initiated Effexor XR 37.5 mg daily 10/10/17.  Dose increased to 75 mg daily on 2/2/18.    HISTORY OF PRESENT ILLNESS:  The patient is a 53 y.o. year old female who is here for follow-up with the above-mentioned history.    History of Present Illness the patient returns today in follow-up continuing on adjuvant treatment with Arimidex.  She is also continuing on Effexor XR 75 mg daily for hot flashes.  She was to have undergone additional plastic surgery procedure with Dr. Waterhouse in January however has not yet scheduled the procedure.  She reports that overall she has felt well since her last visit.  She has some arthralgias however these mainly involve her knees and shoulders and are chronic in nature.  She feels that they are unrelated to her Arimidex.  She notes that her hot flashes have improved overall.      Past Medical History:   Diagnosis Date   • Anxiety     • Asthma    • Breast cancer (CMS/HCC) 2017   • Cancer (CMS/HCC) 2017    Right   • GERD (gastroesophageal reflux disease) 2017   • Herpes     + HSv no outbreaks   • Lichen sclerosus    • Lichen sclerosus et atrophicus 2017   • Migraine    • Osteopenia    • Polyp of colon, adenomatous      Past Surgical History:   Procedure Laterality Date   • BILATERAL BREAST REDUCTION Left 12/15/2017    Procedure: LEFT BREAST REDUCTION ;  Surgeon: Maurine Waterhouse, MD;  Location: [facility] OR;  Service:    • BREAST BIOPSY Right 2017   • BREAST RECONSTRUCTION Right 2017    Procedure: RT TISSUE EXPANDER W/ ALLODERM;  Surgeon: Maurine Waterhouse, MD;  Location: [facility] OR;  Service:    • BREAST TISSUE EXPANDER REMOVAL INSERTION OF IMPLANT Right 12/15/2017    Procedure: RIGHT BREAST TISSUE EXPANDER REMOVAL INSERTION OF IMPLANT;  Surgeon: Maurine Waterhouse, MD;  Location: [facility] OR;  Service:    •  SECTION     • COLONOSCOPY N/A 2017    Procedure: COLONOSCOPY, polypectomy;  Surgeon: Lalita Dubose MD;  Location: [facility] OR;  Service:    • D&C HYSTEROSCOPY N/A 2017    Procedure: DILATATION AND CURETTAGE HYSTEROSCOPY;  Surgeon: Dee Morales MD;  Location: [facility] OR;  Service:    • DIAGNOSTIC LAPAROSCOPY Bilateral 2017    Procedure: DIAGNOSTIC LAPAROSCOPY,laproscopic bilateral salpingoophorectomy ;  Surgeon: Dee Morales MD;  Location: [facility] OR;  Service:    • DILATATION AND CURETTAGE     • HAND SURGERY Right    • MASTECTOMY Right 2017    Procedure: right total mastectomy, right axillary sentinel lymph node biopsy x2 with immediate reconstruction of right breast with expander.;  Surgeon: Dyan Kinsey MD;  Location: [facility] OR;  Service:    • PELVIC LAPAROSCOPY      BSO   • REDUCTION MAMMAPLASTY         ONCOLOGIC HISTORY:  (History from previous dates can be found in the separate document.)    Current Outpatient Medications on File Prior to  Visit   Medication Sig Dispense Refill   • anastrozole (ARIMIDEX) 1 MG tablet Take 1 tablet by mouth Daily. 30 tablet 5   • Calcium Carbonate-Vitamin D (CALCIUM PLUS VITAMIN D PO) Take 1,200 mg by mouth 2 (Two) Times a Day.     • clobetasol (TEMOVATE) 0.05 % ointment Apply to affected area as needed for itching. Do not exceed 7 days 30 g 2   • fluticasone-salmeterol (ADVAIR) 100-50 MCG/DOSE DISKUS Inhale 1 puff As Needed (SOB OR WHEEZING).     • Multiple Vitamins-Minerals (MULTIVITAMIN ADULT PO) Take  by mouth.     • rosuvastatin (CRESTOR) 5 MG tablet Take 5 mg by mouth Every Night.     • venlafaxine XR (EFFEXOR-XR) 75 MG 24 hr capsule take 1 capsule by mouth once daily 30 capsule 5   • vitamin D (ERGOCALCIFEROL) 36650 units capsule capsule take 1 capsule by mouth every week 8 capsule 0   • ENGERIX-B 20 MCG/ML injection inject 1 milliliter intramuscularly  0     No current facility-administered medications on file prior to visit.        ALLERGIES:     Allergies   Allergen Reactions   • Sesame Seed [Sesame Oil] Anaphylaxis     Sunflower seed and the food family of it     • Tramadol Other (See Comments)     Social History     Socioeconomic History   • Marital status:      Spouse name: Not on file   • Number of children: 1   • Years of education: College   • Highest education level: Not on file   Social Needs   • Financial resource strain: Not on file   • Food insecurity - worry: Not on file   • Food insecurity - inability: Not on file   • Transportation needs - medical: Not on file   • Transportation needs - non-medical: Not on file   Occupational History   • Occupation:      Employer: [employer]   Tobacco Use   • Smoking status: Former Smoker     Packs/day: 0.50     Years: 10.00     Pack years: 5.00     Types: Cigarettes     Last attempt to quit:      Years since quittin.2   • Smokeless tobacco: Never Used   Substance and Sexual Activity   • Alcohol use: Yes     Comment:  Occasional   • Drug use: No   • Sexual activity: Defer     Birth control/protection: Surgical     Comment: s/p BSO   Other Topics Concern   • Not on file   Social History Narrative   • Not on file     Family History   Problem Relation Age of Onset   • Breast cancer Mother 70   • Hearing loss Mother    • Hyperlipidemia Mother    • Colon cancer Mother 77   • Diabetes Father    • Asthma Sister    • Clotting disorder Sister    • COPD Sister    • Pulmonary embolism Sister    • Deep vein thrombosis Brother    • Pulmonary embolism Brother    • Colon cancer Maternal Grandfather 56   • Malig Hyperthermia Neg Hx    • Ovarian cancer Neg Hx         Review of Systems   Constitutional: Negative for activity change, appetite change, fatigue, fever and unexpected weight change.   HENT: Negative for congestion, mouth sores, nosebleeds, sore throat and voice change.    Respiratory: Negative for cough, shortness of breath and wheezing.    Cardiovascular: Negative for chest pain, palpitations and leg swelling.   Gastrointestinal: Negative for abdominal distention, abdominal pain, blood in stool, constipation, diarrhea, nausea and vomiting.   Endocrine: Positive for heat intolerance. Negative for cold intolerance.   Genitourinary: Negative for difficulty urinating, dysuria, frequency, hematuria and pelvic pain.   Musculoskeletal: Positive for arthralgias. Negative for back pain, joint swelling and myalgias.   Skin: Negative for wound.   Neurological: Negative for dizziness, syncope, weakness, light-headedness, numbness and headaches.   Hematological: Negative for adenopathy. Does not bruise/bleed easily.   Psychiatric/Behavioral: Negative for confusion and sleep disturbance. The patient is not nervous/anxious.          Objective      Vitals:    03/19/19 1626   BP: 116/76   Pulse: 80   Resp: 16   Temp: 97.7 °F (36.5 °C)   SpO2: 97%      Current Status 3/19/2019   ECOG score 0   Pain 0/10    Physical Exam   Constitutional: She is oriented  to person, place, and time. She appears well-developed and well-nourished.   HENT:   Mouth/Throat: Oropharynx is clear and moist.   Eyes: Conjunctivae are normal.   Neck: No thyromegaly present.   Cardiovascular: Normal rate and regular rhythm. Exam reveals no gallop and no friction rub.   No murmur heard.  Pulmonary/Chest: Breath sounds normal. No respiratory distress.   Right mastectomy with implant reconstruction.  No masses no abnormalities identified.  On the left, status post reduction surgery with no masses no abnormalities palpated.   Abdominal: Soft. Bowel sounds are normal. She exhibits no distension. There is no tenderness.   Musculoskeletal: She exhibits no edema.   Lymphadenopathy:        Head (right side): No submandibular adenopathy present.     She has no cervical adenopathy.     She has no axillary adenopathy.        Right: No inguinal and no supraclavicular adenopathy present.        Left: No inguinal and no supraclavicular adenopathy present.   Neurological: She is alert and oriented to person, place, and time. She displays normal reflexes. No cranial nerve deficit. She exhibits normal muscle tone.   Skin: Skin is warm and dry. No rash noted.   Psychiatric: She has a normal mood and affect. Her behavior is normal.       RECENT LABS:  Hematology WBC   Date Value Ref Range Status   03/19/2019 9.55 3.40 - 10.80 10*3/mm3 Final     RBC   Date Value Ref Range Status   03/19/2019 5.22 3.77 - 5.28 10*6/mm3 Final     Hemoglobin   Date Value Ref Range Status   03/19/2019 15.0 12.0 - 15.9 g/dL Final     Hematocrit   Date Value Ref Range Status   03/19/2019 46.7 (H) 34.0 - 46.6 % Final     Platelets   Date Value Ref Range Status   03/19/2019 316 140 - 450 10*3/mm3 Final        Lab Results   Component Value Date    GLUCOSE 134 (H) 03/19/2019    BUN 20 03/19/2019    CREATININE 0.70 03/19/2019    EGFRIFNONA 88 03/19/2019    BCR 28.6 03/19/2019    K 4.5 03/19/2019    CO2 26.4 03/19/2019    CALCIUM 9.5 03/19/2019     ALBUMIN 4.50 03/19/2019    AST 16 03/19/2019    ALT 17 03/19/2019         Assessment/Plan      1. Pathologic stage IA ([id number]) right breast cancer: The patient had a mammographically detected nonpalpable abnormality of the right breast measuring 1.3 cm by ultrasound in the 10 o'clock position with biopsy 7/3/17 showing an invasive lobular carcinoma largest size measuring 9 mm, grade 1, ER positive (95%), VA positive (95%), HER-2/tanya 0 (IHC).  There were foci of associated LCIS.  There was no lymphatic nor perineural invasion.  Oncotype DX testing was sent revealing a recurrent score in a low risk range at 12 (10 year recurrence risk of 8% with treatment with tamoxifen, 1% benefit from adjuvant chemotherapy) and confirmed ER positive, VA positive, HER-2/tanya negative.  MRI of the breasts 7/13/17 showed the biopsy-proven right breast malignancy measuring 1.9 cm with no axillary lymph node enlargement.  There was however in the middle third of the right breast a cluster of microcalcifications that appear to have increased over time.  An MRI guided biopsy of this area was performed 7/25/17 in the 9:30 position revealing an invasive lobular carcinoma, 4 mm, grade 2, ER positive (greater than 90%), VA positive (90%), HER-2/tanya 1+ recurrent sees IHC) with associated LCIS.  The patient was seen in the genetics clinic and underwent Gene DX panel testing 7/19/17 which was negative.  She has a family history of malignancy with breast cancer in her mother at age 70.  The patient saw Dr. Kinsey who discussed surgical management of her disease.  With evidence of multifocal involvement of the breast, it was recommended to undergo a right mastectomy with sentinel lymph node biopsy.  She did see Dr. Waterhouse as well in regards to reconstruction.    The patient was seen in initial consultation on 8/2/17. Oncotype DX score was in the low risk category at 12 indicating a 10 year recurrence risk of distant metastasis of 8% with  tamoxifen alone.  We discussed that adjuvant chemotherapy would provide only approximately a 1% additional benefit.  It was not recommended for her to receive adjuvant chemotherapy.      Right mastectomy with implant reconstruction and sentinel lymph node biopsy performed on 8/23/17.  Pathology showed a 1.8 cm grade 2 invasive lobular carcinoma with negative margins (greater than 1 cm), extensive associated LCIS and non-atypical ductal hyperplasia, sentinel lymph node negative ×2 with evidence of multiple noncaseating granulomas.  Therefore, the patient had Stage IA ([id number]) disease.  Again, it was not recommended for her to receive adjuvant chemotherapy given her low risk Oncotype score.  This would provide only an additional 1% benefit.  We discussed pursuing adjuvant endocrine therapy.  As she was premenopausal, this would typically be undertaken with tamoxifen.  There was concern however given the patient's significant family history of thrombosis occurring at a young age with no identifiable/traceable risk factor.  Tamoxifen would confer a significant additional risk for her although she has not experienced a personal history of thrombosis.  Also, we discussed that there is suggestion from subgroup analysis of the BIG 1-98 study to suggest a significant additional benefit of aromatase inhibitor therapy over tamoxifen for invasive lobular cancer.  Therefore, the patient underwent bilateral salpingo-oophorectomy 9/26/17 (pathology benign) producing surgical postmenopausal state. Baseline DEXA scan showed mild osteopenia 9/21/17 with a T score in the left femoral neck -1.2.  On 10/10/17, initiated Arimidex 1 mg daily and Effexor XRT 37.5 mg daily.    Right expander removal and implant placement as well as left breast reduction performed 12/15/17.    Significant hot flashes prompted increase in Effexor dose to 75 mg daily 2/2/18.    The patient returns in follow-up today continuing on adjuvant Arimidex.  She is  also continuing on Effexor XR 75 mg daily for hot flashes.  She reports that hot flashes have gradually improved over time.  She is tolerating Arimidex well.  She has no clinical evidence of recurrent disease.  Her exam today is negative.  She will be due for her annual mammogram on the left in June 2019 and we will schedule this for her.  She will also be due for a 2-year follow-up DEXA scan prior to her return visit here in 6 months.  She likely will schedule her plastic surgery procedure with Dr. Waterhouse in the near future.  2. Family history of thrombosis: The patient has no personal history of thrombosis.  She has a significant family history of thrombosis however with a brother who developed a pulmonary embolism in his 30s, a brother developed a DVT at age 44, and a sister with a pulmonary embolism at age 55, all unprovoked.  The patient herself did undergo a hypercoagulable evaluation performed by her primary care physician [facility] 9/24/15 which showed negative prothrombin gene mutation analysis, negative factor V Leiden gene mutation analysis, negative lupus anticoagulant, negative anticardiolipin antibodies, normal homocystine, protein C activity 168%, protein S free 107%.   we did perform additional hypercoagulable labs from 8/3/17 showing protein S free antigen 109%, protein S total antigen 125%, protein S activity 83%, antithrombin III activity 96%, beta-2 GP 1 antibody panel negative.  Therefore, the patient has no detectable underlying hypercoagulable state.  None of her family members however had a detectable risk factor either.  This does produce some uncertainty in regards to her risk with tamoxifen.  Therefore was elected to proceed with aromatase inhibitor therapy instead as she became surgically postmenopausal.  She does continue on aspirin 81 mg a day.    3. Osteopenia: The patient had a baseline DEXA scan from 9/21/17 showing mild osteopenia, maximal T score -1.2 and left  femoral neck.  Anticipate a 2 year interval follow-up study on aromatase inhibitor therapy.  This will be due prior to her return visit here in 6 months and we will schedule the DEXA scan for her.  4. Vitamin D deficiency: 25 hydroxy vitamin D level on 10/10/17 was very low at 17.8.  The patient was asked to increase vitamin D supplementation to 2400 units daily.  Vitamin D level was still low at 26.9 on 2/2/18.  High dose vitamin D prescribed 50,000 units weekly ×8 weeks and then returned to her previous dose 2400 units daily.  Follow-up 25-hydroxy vitamin D level on 5/2/18 was 46.4.  Repeat 25 hydroxy vitamin D level normal at 37.3 today.  5. Postmenopausal hot flashes: The patient was experiencing these postoperatively from her bilateral salpingo-oophorectomy.  With initiation of adjuvant aromatase inhibitor therapy on 10/10/17, it was elected to also currently begin Effexor XR 37.5 mg daily.  Unfortunately this did not adequately control her symptoms and Effexor dose was increased to 75 mg daily 2/2/18.  The patient notes overall improvement in hot flashes recently.  6. Health maintenance: The patient underwent a screening colonoscopy on 11/7/17 at age 52.  2 polyps were removed, benign adenomas.  The patient's mother was subsequently diagnosed with colon cancer in 2018 at age 77.  We did review her previous genetic test profile which did include mismatch repair genes and was negative.  It was requested by Dr. Tom that she undergo a one year follow-up colonoscopy which was due in November 2018.      Plan:    1. Continue adjuvant Arimidex 1 mg daily  2. Continue Effexor XR dose to 75 mg daily  3. Continue aspirin 81 mg daily  4. Continue vitamin D 2400 units daily.   5. Proceed with additional reconstructive surgery with Dr. Waterhouse in the near future  6. We will contact Dr. Tom's office regarding colonoscopy that was due in November 2018  7. Arrange annual left mammogram in June 2019  8. M.D. visit in 6  months with CBC, CMP.  Just prior to the visit, the patient will undergo 2-year follow-up DEXA scan.

## 2019-03-20 ENCOUNTER — TELEPHONE (OUTPATIENT)
Dept: ONCOLOGY | Facility: HOSPITAL | Age: 54
End: 2019-03-20

## 2019-03-20 NOTE — TELEPHONE ENCOUNTER
----- Message from Zhou Gallegos Jr., MD sent at 3/19/2019 10:49 PM EDT -----  Please notify patient of normal vitamin D level from today at 37.3.  Message left.

## 2019-05-14 RX ORDER — ANASTROZOLE 1 MG/1
TABLET ORAL
Qty: 30 TABLET | Refills: 5 | Status: SHIPPED | OUTPATIENT
Start: 2019-05-14 | End: 2020-01-06

## 2019-06-04 ENCOUNTER — TELEPHONE (OUTPATIENT)
Dept: SURGERY | Facility: CLINIC | Age: 54
End: 2019-06-04

## 2019-06-04 NOTE — TELEPHONE ENCOUNTER
SPOKE WITH LEONIE @ DR. FERNANDES'S OFFICE. DR. DOM ADAMES WITH PT COMPLETING MAMMO ORDERED BY DR. JESUS PER LEONIE. PT SCHEDULED FOR 06/28 MAMMO @  [facility]. F/[facility]/ [facility] 07/12 @ 2:00PM. PT CONFIRMED.      KL

## 2019-06-28 ENCOUNTER — HOSPITAL ENCOUNTER (OUTPATIENT)
Dept: MAMMOGRAPHY | Facility: HOSPITAL | Age: 54
Discharge: HOME | End: 2019-06-28
Admitting: SURGERY

## 2019-06-28 DIAGNOSIS — C50.411 MALIGNANT NEOPLASM OF UPPER-OUTER QUADRANT OF RIGHT BREAST IN FEMALE, ESTROGEN RECEPTOR POSITIVE (HCC): ICD-10-CM

## 2019-06-28 DIAGNOSIS — Z17.0 MALIGNANT NEOPLASM OF UPPER-OUTER QUADRANT OF RIGHT BREAST IN FEMALE, ESTROGEN RECEPTOR POSITIVE (HCC): ICD-10-CM

## 2019-06-28 PROCEDURE — 77063 BREAST TOMOSYNTHESIS BI: CPT

## 2019-06-28 PROCEDURE — 77067 SCR MAMMO BI INCL CAD: CPT

## 2019-07-01 ENCOUNTER — TELEPHONE (OUTPATIENT)
Dept: SURGERY | Facility: CLINIC | Age: 54
End: 2019-07-01

## 2019-07-12 ENCOUNTER — OFFICE VISIT (OUTPATIENT)
Dept: SURGERY | Facility: CLINIC | Age: 54
End: 2019-07-12

## 2019-07-12 VITALS
BODY MASS INDEX: 32.47 KG/M2 | DIASTOLIC BLOOD PRESSURE: 68 MMHG | HEART RATE: 83 BPM | SYSTOLIC BLOOD PRESSURE: 110 MMHG | WEIGHT: 172 LBS | HEIGHT: 61 IN | OXYGEN SATURATION: 97 %

## 2019-07-12 DIAGNOSIS — E66.9 OBESITY (BMI 30-39.9): ICD-10-CM

## 2019-07-12 DIAGNOSIS — Z80.3 FH: BREAST CANCER: ICD-10-CM

## 2019-07-12 DIAGNOSIS — Z82.49 FAMILY HISTORY OF DVT: ICD-10-CM

## 2019-07-12 DIAGNOSIS — C50.411 MALIGNANT NEOPLASM OF UPPER-OUTER QUADRANT OF RIGHT BREAST IN FEMALE, ESTROGEN RECEPTOR POSITIVE (HCC): Primary | ICD-10-CM

## 2019-07-12 DIAGNOSIS — Z17.0 MALIGNANT NEOPLASM OF UPPER-OUTER QUADRANT OF RIGHT BREAST IN FEMALE, ESTROGEN RECEPTOR POSITIVE (HCC): Primary | ICD-10-CM

## 2019-07-12 PROCEDURE — 99213 OFFICE O/P EST LOW 20 MIN: CPT | Performed by: SURGERY

## 2019-07-12 NOTE — PROGRESS NOTES
Chief Complaint: Jessica uSazo is a 53 y.o. female who was seen in consultation at the request of Dee Morales MD  for Malignant neoplasm of upper-outer quadrant of right female breast  and a followup visit    History of Present Illness:  Patient presents with newly diagnosed breast cancer, RIGHT breast. She noted no new masses, skin changes, nipple discharge, nipple changes prior to her most recent imaging.  Her most recent imaging includes the following: June 22, 2017 heterogenous a dense breast tissue.  2 cm mass in the axillary tail the right breast, BI-RADS 0.  Most previous prior was January 11, 2016 screening mammogram that showed bilateral calcifications right greater than left with no change.  BI-RADS 2.  She came back June 28, 2017 for a right breast ultrasound that showed at the 10:00 location a mass in the right breast measuring 1.3 cm, BI-RADS 5.  July 3, 2017 underwent an ultrasound-guided biopsy for cores a 14-gauge.  Marker left at the medial margin of the lesion.  Pathology returned as invasive lobular carcinoma, low grade, 3, 1, 1, foci of LCIS, no lymphovascular invasion, estrogen 95 progesterone 95 and HER-2/tanya 0.    I then had her get a bilateral breast MRI at [facility] July 14, 2017.  Right breast 10:00, 9 cm from the nipple a 1.9 x 1.7 cm mass with a clip at the posterior margin.  Bilateral short scattered segmental enhancement.  Reviewed outside mammogram and felt that the calcifications are greater on the right than the left.  Also some areas of short segment enhancement right breast.  Recommend sampling either of calcifications or enhancement.  I discussed this in person with Dr. Barron and this was his assessment.    She has not had a breast biopsy in the past.  She has her uterus and ovaries, is premenopausal, and takes no hormones.  Her family history includes the following: She has one son who is 20.  She has one sister, 7 brothers, no maternal aunts, 4  paternal aunts.  She has 2 brothers and 1 sister who had DVTs and/or pulmonary emboli.  Her mother had breast cancer age 70, no other breast or ovarian cancer in her family.    Oncotype DX score dated July 25, 2017 returned as low risk score of 12 with a 10 year risk of distant recurrence on tamoxifen alone of 8%      Genetic counseling on Jessica Suazo dated July 19, 2017-Gene DX multi-gene panel was sent. Negative for mutation.  Pathology from RIGHT total mastectomy and RIGHT SLNB with immediate recosntruction Dr Waterhouse returned as   In situ and invasive lobular carcinoma, intermediate grade, 3, 2, 1, no duct carcinoma in situ present but lobular carcinoma in situ was present.  Greatest dimension of invasive carcinoma 1.8 cm.  Margins are uninvolved with closest margin greater than 1 cm.  0 of 2 lymph nodes involved. Extensive non-atypical duct hyperplasia and LCIS in the mastectomy specimen.   Pathologic stage TI CN 0.   She has had one of HER-2 drains removed.  She denies any redness warmth from her incision.      In the interim, Jessica Suazo  has done well.  She had a BSO with Dr Morales 9-2017.  Dr Gallegos started arimidex 10-10-17. She then started effexor and vitamin D.  She had her expander exchanged for implant on the RIGHT and a LEFT reduction in  with Dr Waterhouse (12-15-17).    She has noted no changes in her breast exam. No new masses, skin changes, nipple changes, nipple discharge LEFT  breast.  No nodules or discolorations RIGHT reconstructed side.    She denies headache, bone pain, belly pain, cough, changes in vision or gait.    Her most recent imaging includes the following:  [facility] left screening mammogram with 3-D: Heterogenouslydense parenchyma.  Mild architectural distortion left breast related to surgery.  BI-RADS 2.    Interval HIstory:  In the interim, Jessica Suazo  has done well.  She has noted no changes in her breast exam. No new masses, skin  changes, nipple changes, nipple discharge LEFT  breast.  No nodules or discolorations RIGHT reconstructed side.    She denies headache, bone pain, belly pain, cough, changes in vision or gait.    She has lost 25 pounds intentionally with increased exercise walking her dog and portion control.  She is not yet seen Dr. Waterhouse back for fat grafting or nipple reconstruction.  She continues the Arimidex and sees Dr. Gallegos every 6 months.    Her most recent imaging includes the following:  [facility] June 28, 2019 bilateral screening mammogram with 3D.  Scattered fibroglandular densities.  No evidence of malignancy BI-RADS 2.    Review of Systems:  Review of Systems   Constitutional: Negative for unexpected weight change (20 LB WT LOSS).   All other systems reviewed and are negative.       Past Medical and Surgical History:  Breast Biopsy History:  Patient had not had a breast biopsy prior to her cancer diagnosis.  Breast Cancer HIstory:  Patient does not have a past medical history of breast cancer.  Breast Operations, and year:  None   Obstetric/Gynecologic History:  Age menstrual periods began: 14  Patient is premenopausal, first day of last period: 7/1/2017  Number of pregnancies:1  Number of live births: 1  Number of abortions or miscarriages: 0  Age of delivery of first child: 29  Patient did not breast feed.  Length of time taking birth control pills:6 yrs   Patient has never taken hormone replacement  Patient still has uterus and ovaries.     Past Surgical History:   Procedure Laterality Date   • BILATERAL BREAST REDUCTION Left 12/15/2017    Procedure: LEFT BREAST REDUCTION ;  Surgeon: Maurine Waterhouse, MD;  Location: [facility];  Service:    • BREAST BIOPSY Right 2017   • BREAST RECONSTRUCTION Right 8/23/2017    Procedure: RT TISSUE EXPANDER W/ ALLODERM;  Surgeon: Maurine Waterhouse, MD;  Location: [facility];  Service:    • BREAST TISSUE EXPANDER REMOVAL INSERTION OF IMPLANT Right  12/15/2017    Procedure: RIGHT BREAST TISSUE EXPANDER REMOVAL INSERTION OF IMPLANT;  Surgeon: Maurine Waterhouse, MD;  Location: [facility] MAIN OR;  Service:    •  SECTION     • COLONOSCOPY N/A 2017    Procedure: COLONOSCOPY, polypectomy;  Surgeon: Lalita Dubose MD;  Location: [facility] OR;  Service:    • D&C HYSTEROSCOPY N/A 2017    Procedure: DILATATION AND CURETTAGE HYSTEROSCOPY;  Surgeon: Dee Morales MD;  Location: [facility] OR;  Service:    • DIAGNOSTIC LAPAROSCOPY Bilateral 2017    Procedure: DIAGNOSTIC LAPAROSCOPY,laproscopic bilateral salpingoophorectomy ;  Surgeon: Dee Morales MD;  Location: [facility] OR;  Service:    • DILATATION AND CURETTAGE     • HAND SURGERY Right    • MASTECTOMY Right 2017    Procedure: right total mastectomy, right axillary sentinel lymph node biopsy x2 with immediate reconstruction of right breast with expander.;  Surgeon: Dyan Kinsey MD;  Location: [facility] OR;  Service:    • PELVIC LAPAROSCOPY      BSO   • REDUCTION MAMMAPLASTY       Past Medical History:   Diagnosis Date   • Anxiety    • Asthma    • Breast cancer (CMS/HCC) 2017   • Cancer (CMS/HCC) 2017    Right   • GERD (gastroesophageal reflux disease) 2017   • Herpes     + HSv no outbreaks   • Lichen sclerosus    • Lichen sclerosus et atrophicus 2017   • Migraine    • Osteopenia    • Polyp of colon, adenomatous        Prior Hospitalizations, other than for surgery or childbirth, and year:  None     Social History     Socioeconomic History   • Marital status:      Spouse name: Not on file   • Number of children: 1   • Years of education: College   • Highest education level: Not on file   Occupational History   • Occupation:      Employer: [employer]   Tobacco Use   • Smoking status: Former Smoker     Packs/day: 0.50     Years: 10.00     Pack years: 5.00     Types: Cigarettes     Last attempt to quit:      Years since quitting:  "12.5   • Smokeless tobacco: Never Used   Substance and Sexual Activity   • Alcohol use: Yes     Comment: Occasional   • Drug use: No   • Sexual activity: Defer     Birth control/protection: Surgical     Comment: s/p BSO     Patient is .  Patient is employed full time with the following occupation:   drinks caffeine    Family History:  Family History   Problem Relation Age of Onset   • Breast cancer Mother 70   • Hearing loss Mother    • Hyperlipidemia Mother    • Colon cancer Mother 77   • Diabetes Father    • Asthma Sister    • Clotting disorder Sister    • COPD Sister    • Pulmonary embolism Sister    • Deep vein thrombosis Brother    • Pulmonary embolism Brother    • Colon cancer Maternal Grandfather 56   • Malig Hyperthermia Neg Hx    • Ovarian cancer Neg Hx        Vital Signs:  /68 (BP Location: Left arm, Patient Position: Sitting, Cuff Size: Adult)   Pulse 83   Ht 154.9 cm (61\")   Wt 78 kg (172 lb)   LMP  (LMP Unknown)   SpO2 97%   Breastfeeding? No   BMI 32.50 kg/m²      Medications:    Current Outpatient Medications:   •  anastrozole (ARIMIDEX) 1 MG tablet, take 1 tablet by mouth once daily, Disp: 30 tablet, Rfl: 5  •  Calcium Carbonate-Vitamin D (CALCIUM PLUS VITAMIN D PO), Take 1,200 mg by mouth 2 (Two) Times a Day., Disp: , Rfl:   •  clobetasol (TEMOVATE) 0.05 % ointment, Apply to affected area as needed for itching. Do not exceed 7 days, Disp: 30 g, Rfl: 2  •  ENGERIX-B 20 MCG/ML injection, inject 1 milliliter intramuscularly, Disp: , Rfl: 0  •  fluticasone-salmeterol (ADVAIR) 100-50 MCG/DOSE DISKUS, Inhale 1 puff As Needed (SOB OR WHEEZING)., Disp: , Rfl:   •  Multiple Vitamins-Minerals (MULTIVITAMIN ADULT PO), Take  by mouth., Disp: , Rfl:   •  rosuvastatin (CRESTOR) 5 MG tablet, Take 5 mg by mouth Every Night., Disp: , Rfl:   •  venlafaxine XR (EFFEXOR-XR) 75 MG 24 hr capsule, take 1 capsule by mouth once daily, Disp: 30 capsule, Rfl: 5  •  vitamin D (ERGOCALCIFEROL) " "53755 units capsule capsule, take 1 capsule by mouth every week, Disp: 8 capsule, Rfl: 0     Allergies:  Allergies   Allergen Reactions   • Sesame Seed [Sesame Oil] Anaphylaxis     Sunflower seed and the food family of it     • Tramadol Other (See Comments)       Physical Examination:  /68 (BP Location: Left arm, Patient Position: Sitting, Cuff Size: Adult)   Pulse 83   Ht 154.9 cm (61\")   Wt 78 kg (172 lb)   LMP  (LMP Unknown)   SpO2 97%   Breastfeeding? No   BMI 32.50 kg/m²   General Appearance:  Patient is in no distress.  She is well kept and has an obese build.   Psychiatric:  Patient with appropriate mood and affect. Alert and oriented to self, time, and place.    Breast, RIGHT:  Surgically absent with implant in place. Well healed transverse incision.  No skin nodules or discolorations.    Breast, LEFT:  medium sized, asymmetric with the contralateral surgically absent side.  Breast skin is without erythema, edema, rashes.  There are no visible abnormalities upon inspection during the arm-raising maneuver or with hands on hips in the sitting position. There is no nipple retraction, discharge or nipple/areolar skin changes.There are no masses palpable in the sitting or supine positions. Well healed reduction pattern incisions.    Lymphatic:  There is no axillary, cervical, infraclavicular, or supraclavicular adenopathy bilaterally.  Eyes:  Pupils are round and reactive to light.  Cardiovascular:  Heart rate and rhythm are regular.  Respiratory:  Lungs are clear bilaterally with no crackles or wheezes in any lung field.  Gastrointestinal:  Abdomen is soft, nondistended, and nontender.   Musculoskeletal:  Good strength in all 4 extremities.   There is good range of motion in both shoulders.    Skin:  No new skin lesions or rashes on the skin excluding the breast (see breast exam above).        Imagin/5/14    SCREENING MAMMOGRAM CLARA LYNCH  Heterogeneously dense. BIRADS CATEGORY 2: " Benign    1/11/16   BILATERAL SCREENING MAMMOGRAM CLARA LYNCH  Multiple small benign-appearing calcifications bilaterally, more numerous on the right. Unchanged.  BIRADS CATEGORY 2    6/22/17  MAMMO SCREENING TOMOSYNTHESIS BILATERAL   CLARA LYNCH  The breasts are heterogeneously dense. Suspicious irregular mass axillary portion of the right breast measuring 2 cm. This is a new finding. Ultrasound examination is recommended.  BIRADS CATEGORY 0    6/28/17  US BREAST RIGHT CLARA LYNCH  Suspicious hypoechoic, irregular, shadowing mass at the 10:00 position right breast. Measures at least 1.3 cm.  BI-RADS CATEGORY 5    7/13/17  [facility] MRI BREAST BILATERAL   CLARA LYNCH   Right breast 10-o’clock 9 cm of the nipple enhancing mass that measures 1.9 cm in the anterior to posterior, 1.7 cm in the superior to inferior and 1.6 cm in the medial to lateral. A metallic clip is located along the posterior margin. No evidence for axillary adenopathy in either axilla. There is short segment linear enhancement that is scattered in both breasts and is below threshold. However, I do not have a recent mammogram for comparison. I recommend that a mammogram obtained in the last 6 months be pronounced for comparison to assess for the resented of any calcifications in the region which could warrant biopsy.   BIRADS Category 0    7/14/17  [facility]  ADDENDUM, BILATERAL BREAST MRI CLARA LYNCH  There are calcifications seen in an asymmetric distribution between the right and left breast. The preponderance of calcifications in the right breast relative to the left breast can be seen dating back to the prior examinations of 06/04/2014. In the middle third of the right breast at 12 o’clock position these is a cluster if microcalcifications that appears to have slightly increased in number over the prior years. Correlation with a stereotactic guided right breast biopsy of these calcifications is recommended. I  "recommend that the patient undergo a breast MRI examination in one year and 6 months mammographic follow-up of both breasts.     [facility] left screening mammogram with 3-D: Heterogenous Shankar dense parenchyma.  Mild architectural distortion left breast related to surgery.  BI-RADS 2.    [facility] June 28, 2019 bilateral screening mammogram with 3D.  Scattered fibroglandular densities.  No evidence of malignancy BI-RADS 2.        Pathology:  7/3/17     US GUIDED BREAST BIOPSY  CLARA LYNCH  ULTRASOUND-GUIDED CORE NEEDLE BIOPSY, RIGHT. CLIP MARKER PLACEMENT.  Biopsied 4 times 14 gauge Achieve. A clip marker was deployed. Postbiopsy mammogram shows the clip marker along the medial margin of the lesion.  7/3/17  [facility] PATHOLOGY  CLARA LYNCH  A. RIGHT BREAST, CORE BIOPSY: INVASIVE LOBULAR CARCINOMA   0.9 cm. Score I/III (tubules=3, nuclei=1, mitosis=1).  Foci of lobular carcinoma in situ (LCIS) identified. No capillary lymphatic nor perineural invasions identified.  ESTROGEN RECEPTOR          95%  PROGESTERONE RECEPTOR        95%  Her2     0    07/25/17  [facility]     PATHOLOGY  CLARA LYNCH   Final Diagnosis   BREAST, RIGHT, DESIGNATED \"9:30\" CORE BIOPSY:              INVASIVE LOBULAR CARCINOMA WITH ASSOCIATED LOBULAR CARCINOMA IN SITU.              PREDICTED KELLY SCORE: TUBULAR SCORE 3, NUCLEAR SCORE 2, MITOTIC SCORE 1;                       OVERALL GRADE 2 (SCORE 6 OF 9).              MAXIMUM MEASURED LENGTH OF INVASIVE CARCINOMA IN A SINGLE CORE IS 4 MM.              FIBROCYSTIC CHANGES WITH DUCT DILATATION AND STASIS AND APOCRINE METAPLASIA.               FOCAL FIBROADENOMATOID CHANGE.              FOCAL DUCTAL HYPERPLASIA OF THE USUAL TYPE.      COMMENT: The above diagnosis is supported by e-cadherin immunohistochemistry.  ER, KS, and HER-2/tanya studies will be performed with results to be reported in an addendum.      08/23/17 [facility]  SURGICAL PATHOLOGY "  CLARA SUAZO   Final Diagnosis   1.  SENTINEL LYMPH NODE #1, RIGHT AXILLA, EXCISIONAL SPECIMEN (ONE NODE):                          NO TUMOR IDENTIFIED.     2.  SENTINEL LYMPH NODE #2,  RIGHT AXILLA, EXCISIONAL SPECIMEN (ONE NODE):                         NO TUMOR IDENTIFIED                         MULTIPLE NON-NECROTIZING GRANULOMAS.               NEGATIVE STAINS FOR FUNGAL AND ACID FAST ORGANISMS (with appropriate controls).     3.  RIGHT BREAST, TOTAL MASTECTOMY SPECIMEN:                         IN SITU AND INVASIVE LOBULAR CARCINOMA.                         NEGATIVE MARGINS.                         NO ATTACHED LYMPH NODES OR SKELETAL MUSCLE.                         NEGATIVE SKIN AND NIPPLE.               EXTENSIVE LCIS AND NON-ATYPICAL DUCT HYPERPLASIA.                         (SEE SYNOPTIC REPORT.)     DAYO/brb/th  IHC/a/TDJ           from genetic counseling on Claar Suazo dated July 19, 2017 at the time of appointment she expressed an interest in pursuing testing and so a Gene DX multi-gene panel was sent.  Results pending.    Procedures:      Assessment:   Diagnosis Plan   1. Malignant neoplasm of upper-outer quadrant of right breast in female, estrogen receptor positive (CMS/HCC)  Mammo Screening Modified With Tomosynthesis Left With CAD   2. FH: breast cancer     3. Obesity (BMI 30-39.9)     4. Family history of DVT          1-  RIGHT 10:00 7.5 CFN- on exam 3 x 1.75 cm, on ultrasound 1.3 cm, on MRI 1.9 cm. Normal nodes on exam and MRI.  Inv lobular carcinoma, low grade, 3,1,1, foci LCIS, no LVI, ER 95, KY 95, her 2 tanya 0    MRI terenec short segmental enhancement RIGHT- of indeterminate significance--Biopsy results returned as INvasive lobular carcinoma with LCIS, int grade, t3n2m1, max length 4mm. We will have her come back to discuss. I reviewed bridger w Dr Barron again and she will likely need a mastectomy.  Dr. Barron's procedure note is that the recent biopsy and the current biopsy R4  centimeters apart.  The most recent clip is 4 cm inferior and anterior to the previous biopsy site.    Clinical stage [id number]- IA    Pathology from RIGHT total mastectomy and RIGHT SLNB with immediate reconstruction Dr Waterhouse returned as   In situ and invasive lobular carcinoma, intermediate grade, 3, 2, 1, no duct carcinoma in situ present but lobular carcinoma in situ was present.  Greatest dimension of invasive carcinoma 1.8 cm.  Margins are uninvolved with closest margin greater than 1 cm.  0 of 2 lymph nodes involved. Extensive non-atypical duct hyperplasia and LCIS in the mastectomy specimen.   Pathologic stage TI CN 0.      Oncotype DX score dated July 25, 2017 returned as low risk score of 12 with a 10 year risk of distant recurrence on tamoxifen alone of 8%  - will not need xrt  - Dr Morales BSO 9-2017 ( bc of  clotting and couldn't take tamoxifen for DVT risk)  - Dr Gallegos- arimidex 10-10-17  - 12-15-17 Dr Waterhouse RIGHT implant exchange and LEFT mastopexy      2-  Mom age 70  Genetic counseling on Jessica Suazo dated July 19, 2017-Gene DX multi-gene panel was sent. Negative for mutation.    3-  BMI 37    4-  2 brothers and one sister with DVT and/or PE  Patient's and her siblings hypercoag workup normal- Dr Gallegos    Plan:  Mrs. Suazo is doing well today at her 2 year FU.  We reviewed her interval history, exam and imaging and imaging report together today.    Her exam and imaging are in good order.  She has lost 25# and I saluted her on this effort.  I did suggest to her that she wait until she get her weight at a steady state prior to having additional reconstruction as this will affect her symmetry.    We reviewed her surveillance.    I plan to see her back in one year after a LEFT screen mammogram at [facility].  I will arrange that for her.                Dyan Kinsey MD      We spent 15 min in visit, 9 in face to face       Next Appointment:  Return for Next scheduled follow up,  after imaging.      EMR Dragon/transcription disclaimer:    Much of this encounter note is an electronic transcription/translocation of spoken language to printed text.  The electronic translation of spoken language may permit erroneous, or at times, nonsensical words or phrases to be inadvertently transcribed.  Although I have reviewed the note from such areas, some may still exist.

## 2019-09-24 ENCOUNTER — TELEPHONE (OUTPATIENT)
Dept: ONCOLOGY | Facility: CLINIC | Age: 54
End: 2019-09-24

## 2019-09-24 NOTE — TELEPHONE ENCOUNTER
----- Message from Ana Maria Montoya sent at 9/24/2019  8:23 AM EDT -----  Regarding: missed dexa  9/25 appt

## 2020-01-06 RX ORDER — ANASTROZOLE 1 MG/1
TABLET ORAL
Qty: 30 TABLET | Refills: 0 | Status: SHIPPED | OUTPATIENT
Start: 2020-01-06 | End: 2020-02-25 | Stop reason: SDUPTHER

## 2020-01-16 ENCOUNTER — OFFICE VISIT (OUTPATIENT)
Dept: OBSTETRICS AND GYNECOLOGY | Facility: CLINIC | Age: 55
End: 2020-01-16

## 2020-01-16 VITALS
WEIGHT: 162.9 LBS | HEIGHT: 61 IN | DIASTOLIC BLOOD PRESSURE: 62 MMHG | BODY MASS INDEX: 30.76 KG/M2 | SYSTOLIC BLOOD PRESSURE: 120 MMHG

## 2020-01-16 DIAGNOSIS — Z01.419 ROUTINE GYNECOLOGICAL EXAMINATION: Primary | ICD-10-CM

## 2020-01-16 DIAGNOSIS — Z01.419 PAP SMEAR, LOW-RISK: ICD-10-CM

## 2020-01-16 DIAGNOSIS — Z13.9 SCREENING FOR CONDITION: ICD-10-CM

## 2020-01-16 DIAGNOSIS — L90.0 LICHEN SCLEROSUS ET ATROPHICUS: ICD-10-CM

## 2020-01-16 LAB
BILIRUB BLD-MCNC: NEGATIVE MG/DL
CLARITY, POC: CLEAR
COLOR UR: YELLOW
GLUCOSE UR STRIP-MCNC: NEGATIVE MG/DL
KETONES UR QL: NEGATIVE
LEUKOCYTE EST, POC: NEGATIVE
NITRITE UR-MCNC: NEGATIVE MG/ML
PH UR: 5 [PH] (ref 5–8)
PROT UR STRIP-MCNC: NEGATIVE MG/DL
RBC # UR STRIP: NEGATIVE /UL
SP GR UR: 1 (ref 1–1.03)
UROBILINOGEN UR QL: NORMAL

## 2020-01-16 PROCEDURE — 81002 URINALYSIS NONAUTO W/O SCOPE: CPT | Performed by: OBSTETRICS & GYNECOLOGY

## 2020-01-16 PROCEDURE — 99396 PREV VISIT EST AGE 40-64: CPT | Performed by: OBSTETRICS & GYNECOLOGY

## 2020-01-16 RX ORDER — TRAMADOL HYDROCHLORIDE 50 MG/1
TABLET ORAL
COMMUNITY
End: 2020-01-16

## 2020-01-16 RX ORDER — ROSUVASTATIN CALCIUM 5 MG/1
TABLET, COATED ORAL
COMMUNITY
End: 2020-01-16

## 2020-01-16 RX ORDER — HYDROCODONE BITARTRATE AND ACETAMINOPHEN 7.5; 325 MG/1; MG/1
TABLET ORAL
COMMUNITY
End: 2020-08-19

## 2020-01-16 RX ORDER — VENLAFAXINE HYDROCHLORIDE 37.5 MG/1
CAPSULE, EXTENDED RELEASE ORAL
COMMUNITY
End: 2020-09-17 | Stop reason: DRUGHIGH

## 2020-01-16 RX ORDER — VENLAFAXINE HYDROCHLORIDE 75 MG/1
CAPSULE, EXTENDED RELEASE ORAL
COMMUNITY
End: 2020-01-16

## 2020-01-16 RX ORDER — CIPROFLOXACIN 500 MG/1
TABLET, FILM COATED ORAL
COMMUNITY
End: 2020-01-16

## 2020-01-16 RX ORDER — HYDROCODONE BITARTRATE AND ACETAMINOPHEN 7.5; 325 MG/1; MG/1
TABLET ORAL
COMMUNITY
Start: 2019-12-05 | End: 2020-01-16

## 2020-01-16 RX ORDER — AMOXICILLIN 875 MG/1
TABLET, COATED ORAL EVERY 12 HOURS
COMMUNITY
End: 2020-01-16

## 2020-01-16 RX ORDER — CEFDINIR 300 MG/1
CAPSULE ORAL
COMMUNITY
End: 2020-09-17

## 2020-01-16 RX ORDER — GABAPENTIN 400 MG/1
CAPSULE ORAL
COMMUNITY
End: 2020-09-17 | Stop reason: DRUGHIGH

## 2020-01-16 RX ORDER — ANASTROZOLE 1 MG/1
TABLET ORAL
COMMUNITY
End: 2020-01-16

## 2020-01-16 RX ORDER — CLOBETASOL PROPIONATE 0.5 MG/G
OINTMENT TOPICAL
COMMUNITY
End: 2020-01-16

## 2020-01-16 RX ORDER — METHYLPREDNISOLONE 4 MG/1
TABLET ORAL
COMMUNITY
Start: 2019-12-05 | End: 2020-01-16

## 2020-01-16 RX ORDER — HYDROCODONE BITARTRATE AND ACETAMINOPHEN 10; 325 MG/1; MG/1
TABLET ORAL EVERY 12 HOURS SCHEDULED
COMMUNITY
End: 2020-01-16

## 2020-01-16 NOTE — PROGRESS NOTES
GYN Annual Exam     CC- Here for annual exam.     Jessica Suazo is a 54 y.o. female established patient who presents for annual well woman exam. She is doing okay. Has a new dog ( ashly) and is walking and losing weight. No  VB.     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  KYM: Sister and brother with PE, brother with DVT, pt is Protein S normal  Last pap: 2017- normal pap/HPV  Pt is BRCA negative    Health Maintenance   Topic Date Due   • TDAP/TD VACCINES (1 - Tdap) 1978   • ZOSTER VACCINE (1 of 2) 2017   • ANNUAL PHYSICAL  2018   • COLONOSCOPY  2018   • INFLUENZA VACCINE  2019   • Annual Gynecologic Pelvic and Breast Exam  2019   • DXA SCAN  2019   • PAP SMEAR  2020   • MAMMOGRAM  2021       Past Medical History:   Diagnosis Date   • Anxiety    • Asthma    • Breast cancer (CMS/HCC) 2017   • Cancer (CMS/HCC) 2017    Right   • GERD (gastroesophageal reflux disease) 2017   • Herpes     + HSv no outbreaks   • Lichen sclerosus    • Lichen sclerosus et atrophicus 2017   • Migraine    • Osteopenia    • Polyp of colon, adenomatous        Past Surgical History:   Procedure Laterality Date   • BILATERAL BREAST REDUCTION Left 12/15/2017    Procedure: LEFT BREAST REDUCTION ;  Surgeon: Maurine Waterhouse, MD;  Location: [facility] OR;  Service:    • BREAST BIOPSY Right 2017   • BREAST RECONSTRUCTION  Right 2017    Procedure: RT TISSUE EXPANDER W/ ALLODERM;  Surgeon: Maurine Waterhouse, MD;  Location:  [facility] OR;  Service:    • BREAST TISSUE EXPANDER REMOVAL INSERTION OF IMPLANT Right 12/15/2017    Procedure: RIGHT BREAST TISSUE EXPANDER REMOVAL INSERTION OF IMPLANT;  Surgeon: Maurine Waterhouse, MD;  Location:  [facility] OR;  Service:    •  SECTION     • COLONOSCOPY N/A 2017    Procedure: COLONOSCOPY, polypectomy;  Surgeon: Lalita Dubose MD;  Location:  [facility] OR;  Service:    • D&C HYSTEROSCOPY N/A 2017    Procedure: DILATATION AND CURETTAGE HYSTEROSCOPY;  Surgeon: Dee Morales MD;  Location: [facility] OR;  Service:    • DIAGNOSTIC LAPAROSCOPY Bilateral 2017    Procedure: DIAGNOSTIC LAPAROSCOPY,laproscopic bilateral salpingoophorectomy ;  Surgeon: Dee Morales MD;  Location: [facility] OR;  Service:    • DILATATION AND CURETTAGE     • HAND SURGERY Right    • MASTECTOMY Right 2017    Procedure: right total mastectomy, right axillary sentinel lymph node biopsy x2 with immediate reconstruction of right breast with expander.;  Surgeon: Dyan Kinsey MD;  Location: [facility] MA[facility] OR;  Service:    • PELVIC LAPAROSCOPY      BSO   • REDUCTION MAMMAPLASTY           Current Outpatient Medications:   •  anastrozole (ARIMIDEX) 1 MG tablet, take 1 tablet by mouth once daily, Disp: 30 tablet, Rfl: 0  •  Calcium Carbonate-Vitamin D (CALCIUM PLUS VITAMIN D PO), Take 1,200 mg by mouth 2 (Two) Times a Day., Disp: , Rfl:   •  cefdinir (OMNICEF) 300 MG capsule, cefdinir 300 mg capsule, Disp: , Rfl:   •  clobetasol (TEMOVATE) 0.05 % ointment, Apply to affected area as needed for itching. Do not exceed 7 days, Disp: 30 g, Rfl: 2  •  fluticasone-salmeterol (ADVAIR) 100-50 MCG/DOSE DISKUS, Inhale 1 puff As Needed (SOB OR WHEEZING)., Disp: , Rfl:   •  gabapentin (NEURONTIN) 400 MG capsule, gabapentin 400 mg capsule, Disp: , Rfl:   •  Influenza Vac Subunit Quad (FLUCELVAX  QUADRIVALENT) 0.5 ML suspension prefilled syringe injection, Flucelvax Quad 9867-4740 (PF) 60 mcg (15 mcg x 4)/0.5 mL IM syringe, Disp: , Rfl:   •  rosuvastatin (CRESTOR) 5 MG tablet, Take 5 mg by mouth Every Night., Disp: , Rfl:   •  venlafaxine XR (EFFEXOR-XR) 37.5 MG 24 hr capsule, venlafaxine ER 37.5 mg capsule,extended release 24 hr  1 cap po qd, Disp: , Rfl:   •  venlafaxine XR (EFFEXOR-XR) 75 MG 24 hr capsule, take 1 capsule by mouth once daily, Disp: 30 capsule, Rfl: 5  •  vitamin D (ERGOCALCIFEROL) 80109 units capsule capsule, take 1 capsule by mouth every week, Disp: 8 capsule, Rfl: 0  •  HYDROcodone-acetaminophen (NORCO) 7.5-325 MG per tablet, Norco 7.5 mg-325 mg tablet  take one tab po once or twice daily prn for pain, Disp: , Rfl:   •  Multiple Vitamins-Minerals (MULTIVITAMIN ADULT PO), Take  by mouth., Disp: , Rfl:   •  neomycin-colistin-hydrocortisone-thonzonium (CORTISPORIN-TC) 3.3-3-10-0.5 MG/ML otic suspension, Cortisporin-TC 3.3 mg-3 mg-10 mg-0.5 mg/mL ear drops,suspension  Instill 4-5 drops in left ear tid., Disp: , Rfl:     Allergies   Allergen Reactions   • Sesame Seed [Sesame Oil] Anaphylaxis     Sunflower seed and the food family of it     • Tramadol Other (See Comments)       Social History     Tobacco Use   • Smoking status: Former Smoker     Packs/day: 0.50     Years: 10.00     Pack years: 5.00     Types: Cigarettes     Last attempt to quit:      Years since quittin.0   • Smokeless tobacco: Never Used   Substance Use Topics   • Alcohol use: Yes     Comment: Occasional   • Drug use: No       Family History   Problem Relation Age of Onset   • Breast cancer Mother 70   • Hearing loss Mother    • Hyperlipidemia Mother    • Colon cancer Mother 77   • Diabetes Father    • Asthma Sister    • Clotting disorder Sister    • COPD Sister    • Pulmonary embolism Sister    • Deep vein thrombosis Brother    • Pulmonary embolism Brother    • Protein S deficiency Brother    • Colon cancer Maternal  "Grandfather 56   • Malig Hyperthermia Neg Hx    • Ovarian cancer Neg Hx        Review of Systems   Constitutional: Positive for activity change (exercising more) and unexpected weight change (lost weight). Negative for appetite change, fatigue and fever.   Respiratory: Negative for cough and shortness of breath.    Cardiovascular: Negative for chest pain and palpitations.   Gastrointestinal: Negative for abdominal distention, abdominal pain, constipation, diarrhea and nausea.   Endocrine: Positive for heat intolerance (mild).   Genitourinary: Negative for dyspareunia, dysuria, hematuria, menstrual problem, pelvic pain and vaginal discharge.   Skin: Negative for color change and rash (recent shingles).   Neurological: Negative for headaches.   Psychiatric/Behavioral: Negative for dysphoric mood. The patient is not nervous/anxious.        /62   Ht 154.9 cm (60.98\")   Wt 73.9 kg (162 lb 14.4 oz)   LMP  (LMP Unknown)   Breastfeeding No   BMI 30.80 kg/m²     Physical Exam   Constitutional: She is oriented to person, place, and time. She appears well-developed and well-nourished.   HENT:   Head: Normocephalic and atraumatic.   Eyes: Conjunctivae are normal. No scleral icterus.   Neck: Neck supple. No thyromegaly present.   Cardiovascular: Normal rate and regular rhythm.   Pulmonary/Chest: Effort normal and breath sounds normal. Right breast exhibits no mass and no skin change. Left breast exhibits no inverted nipple, no mass, no nipple discharge and no skin change.   R implant noted  L s/p reduction       Abdominal: Soft. Bowel sounds are normal. She exhibits no distension and no mass. There is no tenderness. There is no rebound and no guarding. No hernia.   Genitourinary: Uterus normal.       Pelvic exam was performed with patient supine. There is no rash, tenderness or lesion on the right labia. There is no rash, tenderness or lesion on the left labia. Cervix exhibits no motion tenderness, no discharge and no " friability. Right adnexum displays no mass, no tenderness and no fullness. Left adnexum displays no mass, no tenderness and no fullness. No erythema, tenderness or bleeding in the vagina. No foreign body in the vagina. No signs of injury around the vagina. No vaginal discharge found.   Neurological: She is oriented to person, place, and time.   Skin: Skin is warm and dry.   Psychiatric: She has a normal mood and affect. Her behavior is normal. Judgment and thought content normal.   Vitals reviewed.         Assessment/Plan    1) GYN HM: normal pap/HPV 6/2017    SBE demonstrated and encouraged.  2) STD screening: declines Condoms encouraged.  3) Bone health - Weight bearing exercise, dietary calcium recommendations and vitamin D reviewed.   4) Diet and Exercise discussed  5) Smoking Status: non smoker  6) LS- stable, cont Clobetasol prn  7)MMG:  UTD 6/2019 B2, ff by oncology  8) DEXA- UTD 9/2017, osteopenia, repeat DEXA 9/2020  9)C scope- UTD 12/2017, pt due for repeat now due to precancerous polyps. Refer  10)Parts of this document have been copied or forwarded from her previous visits and have been reviewed, updated and edited as indicated.    Follow up prn and 1 year       Jessica was seen today for gynecologic exam.    Diagnoses and all orders for this visit:    Routine gynecological examination  -     Pap IG, HPV-hr    Screening for condition  -     POC Urinalysis Dipstick  -     DEXA Bone Density Axial; Future  -     Ambulatory Referral For Screening Colonoscopy    Pap smear, low-risk  -     Pap IG, HPV-hr        Dee Morales MD  81/16/2020  3:24 PM

## 2020-01-20 LAB
CYTOLOGIST CVX/VAG CYTO: NORMAL
CYTOLOGY CVX/VAG DOC CYTO: NORMAL
CYTOLOGY CVX/VAG DOC THIN PREP: NORMAL
DX ICD CODE: NORMAL
HIV 1 & 2 AB SER-IMP: NORMAL
HPV I/H RISK 1 DNA CVX QL PROBE+SIG AMP: NORMAL
HPV I/H RISK 4 DNA CVX QL PROBE+SIG AMP: NEGATIVE
OTHER STN SPEC: NORMAL
STAT OF ADQ CVX/VAG CYTO-IMP: NORMAL

## 2020-01-29 ENCOUNTER — TELEPHONE (OUTPATIENT)
Dept: SURGERY | Facility: CLINIC | Age: 55
End: 2020-01-29

## 2020-01-29 NOTE — TELEPHONE ENCOUNTER
Left message for pt to call back.      The following appts have been made:    Mammogram 06/29/2020 scheduled at 10:15 here at [facility], [place].  Follow up with Dr Kinsey on 07/07/2020 at 10:30

## 2020-01-30 ENCOUNTER — TELEPHONE (OUTPATIENT)
Dept: GENERAL RADIOLOGY | Facility: HOSPITAL | Age: 55
End: 2020-01-30

## 2020-01-30 NOTE — TELEPHONE ENCOUNTER
----- Message from Ana Maria Montoya sent at 1/29/2020  3:48 PM EST -----  Regarding: FW: Patient missed last appt and her dexa its reschedule but after your 2/4appt  See note below   ----- Message -----  From: Zhou Gallegos Jr., MD  Sent: 1/29/2020   3:46 PM EST  To: Ana Maria Montoya  Subject: RE: Patient missed last appt and her dexa it#    Need it before  ----- Message -----  From: Ana Maria Montoya  Sent: 1/29/2020   3:20 PM EST  To: Zhou Gallegos Jr., MD  Subject: Patient missed last appt and her dexa its re#

## 2020-02-04 ENCOUNTER — APPOINTMENT (OUTPATIENT)
Dept: LAB | Facility: HOSPITAL | Age: 55
End: 2020-02-04

## 2020-02-19 ENCOUNTER — HOSPITAL ENCOUNTER (OUTPATIENT)
Dept: BONE DENSITY | Facility: HOSPITAL | Age: 55
Discharge: HOME | End: 2020-02-19
Admitting: OBSTETRICS & GYNECOLOGY

## 2020-02-19 DIAGNOSIS — Z13.9 SCREENING FOR CONDITION: ICD-10-CM

## 2020-02-19 PROCEDURE — 77080 DXA BONE DENSITY AXIAL: CPT

## 2020-02-21 NOTE — PROGRESS NOTES
PIP= bone mineral density shows osteopenia with worsening values.  She does not meet the threshold for treatment with medications other than calcium and vitamin D at this point, however, I would recommend repeat this study in 2 years.

## 2020-02-25 RX ORDER — ANASTROZOLE 1 MG/1
1 TABLET ORAL DAILY
Qty: 15 TABLET | Refills: 0 | Status: SHIPPED | OUTPATIENT
Start: 2020-02-25 | End: 2020-03-25

## 2020-02-25 NOTE — TELEPHONE ENCOUNTER
Approved 15 days of Arimidex to get thru until apt on 3/5/20. With note to pt that pt must keep appointments.,

## 2020-02-26 RX ORDER — ANASTROZOLE 1 MG/1
1 TABLET ORAL DAILY
OUTPATIENT
Start: 2020-02-26

## 2020-03-04 ENCOUNTER — LAB (OUTPATIENT)
Dept: LAB | Facility: HOSPITAL | Age: 55
End: 2020-03-04

## 2020-03-04 ENCOUNTER — OFFICE VISIT (OUTPATIENT)
Dept: ONCOLOGY | Facility: CLINIC | Age: 55
End: 2020-03-04

## 2020-03-04 VITALS
TEMPERATURE: 97.8 F | BODY MASS INDEX: 31.66 KG/M2 | RESPIRATION RATE: 16 BRPM | SYSTOLIC BLOOD PRESSURE: 97 MMHG | OXYGEN SATURATION: 96 % | DIASTOLIC BLOOD PRESSURE: 67 MMHG | HEART RATE: 78 BPM | WEIGHT: 167.7 LBS | HEIGHT: 61 IN

## 2020-03-04 DIAGNOSIS — C50.411 MALIGNANT NEOPLASM OF UPPER-OUTER QUADRANT OF RIGHT BREAST IN FEMALE, ESTROGEN RECEPTOR POSITIVE (HCC): ICD-10-CM

## 2020-03-04 DIAGNOSIS — E55.9 VITAMIN D DEFICIENCY DISEASE: Primary | ICD-10-CM

## 2020-03-04 DIAGNOSIS — C50.411 MALIGNANT NEOPLASM OF UPPER-OUTER QUADRANT OF RIGHT BREAST IN FEMALE, ESTROGEN RECEPTOR POSITIVE (HCC): Primary | ICD-10-CM

## 2020-03-04 DIAGNOSIS — Z17.0 MALIGNANT NEOPLASM OF UPPER-OUTER QUADRANT OF RIGHT BREAST IN FEMALE, ESTROGEN RECEPTOR POSITIVE (HCC): ICD-10-CM

## 2020-03-04 DIAGNOSIS — Z17.0 MALIGNANT NEOPLASM OF UPPER-OUTER QUADRANT OF RIGHT BREAST IN FEMALE, ESTROGEN RECEPTOR POSITIVE (HCC): Primary | ICD-10-CM

## 2020-03-04 LAB
25(OH)D3 SERPL-MCNC: 30.1 NG/ML (ref 30–100)
ALBUMIN SERPL-MCNC: 4 G/DL (ref 3.5–5.2)
ALBUMIN/GLOB SERPL: 1.5 G/DL (ref 1.1–2.4)
ALP SERPL-CCNC: 114 U/L (ref 38–116)
ALT SERPL W P-5'-P-CCNC: 14 U/L (ref 0–33)
ANION GAP SERPL CALCULATED.3IONS-SCNC: 12.2 MMOL/L (ref 5–15)
AST SERPL-CCNC: 18 U/L (ref 0–32)
BASOPHILS # BLD AUTO: 0.07 10*3/MM3 (ref 0–0.2)
BASOPHILS NFR BLD AUTO: 0.9 % (ref 0–1.5)
BILIRUB SERPL-MCNC: 0.4 MG/DL (ref 0.2–1.2)
BUN BLD-MCNC: 15 MG/DL (ref 6–20)
BUN/CREAT SERPL: 23.8 (ref 7.3–30)
CALCIUM SPEC-SCNC: 9.1 MG/DL (ref 8.5–10.2)
CHLORIDE SERPL-SCNC: 104 MMOL/L (ref 98–107)
CO2 SERPL-SCNC: 24.8 MMOL/L (ref 22–29)
CREAT BLD-MCNC: 0.63 MG/DL (ref 0.6–1.1)
DEPRECATED RDW RBC AUTO: 43.9 FL (ref 37–54)
EOSINOPHIL # BLD AUTO: 0.44 10*3/MM3 (ref 0–0.4)
EOSINOPHIL NFR BLD AUTO: 5.6 % (ref 0.3–6.2)
ERYTHROCYTE [DISTWIDTH] IN BLOOD BY AUTOMATED COUNT: 13.1 % (ref 12.3–15.4)
GFR SERPL CREATININE-BSD FRML MDRD: 99 ML/MIN/1.73
GLOBULIN UR ELPH-MCNC: 2.7 GM/DL (ref 1.8–3.5)
GLUCOSE BLD-MCNC: 123 MG/DL (ref 74–124)
HCT VFR BLD AUTO: 40.8 % (ref 34–46.6)
HGB BLD-MCNC: 13.1 G/DL (ref 12–15.9)
IMM GRANULOCYTES # BLD AUTO: 0.02 10*3/MM3 (ref 0–0.05)
IMM GRANULOCYTES NFR BLD AUTO: 0.3 % (ref 0–0.5)
LYMPHOCYTES # BLD AUTO: 2.32 10*3/MM3 (ref 0.7–3.1)
LYMPHOCYTES NFR BLD AUTO: 29.7 % (ref 19.6–45.3)
MCH RBC QN AUTO: 29.4 PG (ref 26.6–33)
MCHC RBC AUTO-ENTMCNC: 32.1 G/DL (ref 31.5–35.7)
MCV RBC AUTO: 91.5 FL (ref 79–97)
MONOCYTES # BLD AUTO: 0.65 10*3/MM3 (ref 0.1–0.9)
MONOCYTES NFR BLD AUTO: 8.3 % (ref 5–12)
NEUTROPHILS # BLD AUTO: 4.32 10*3/MM3 (ref 1.7–7)
NEUTROPHILS NFR BLD AUTO: 55.2 % (ref 42.7–76)
NRBC BLD AUTO-RTO: 0 /100 WBC (ref 0–0.2)
PLATELET # BLD AUTO: 252 10*3/MM3 (ref 140–450)
PMV BLD AUTO: 9.2 FL (ref 6–12)
POTASSIUM BLD-SCNC: 3.9 MMOL/L (ref 3.5–4.7)
PROT SERPL-MCNC: 6.7 G/DL (ref 6.3–8)
RBC # BLD AUTO: 4.46 10*6/MM3 (ref 3.77–5.28)
SODIUM BLD-SCNC: 141 MMOL/L (ref 134–145)
WBC NRBC COR # BLD: 7.82 10*3/MM3 (ref 3.4–10.8)

## 2020-03-04 PROCEDURE — 36415 COLL VENOUS BLD VENIPUNCTURE: CPT

## 2020-03-04 PROCEDURE — 85025 COMPLETE CBC W/AUTO DIFF WBC: CPT

## 2020-03-04 PROCEDURE — 80053 COMPREHEN METABOLIC PANEL: CPT

## 2020-03-04 PROCEDURE — 99214 OFFICE O/P EST MOD 30 MIN: CPT | Performed by: INTERNAL MEDICINE

## 2020-03-04 PROCEDURE — 82306 VITAMIN D 25 HYDROXY: CPT | Performed by: INTERNAL MEDICINE

## 2020-03-04 RX ORDER — ALENDRONATE SODIUM 70 MG/1
70 TABLET ORAL
Qty: 4 TABLET | Refills: 5 | Status: SHIPPED | OUTPATIENT
Start: 2020-03-04 | End: 2020-09-17

## 2020-03-04 NOTE — PROGRESS NOTES
Subjective .     REASONS FOR FOLLOWUP:    1. Stage IA ([id number]) right breast cancer: 1.8 cm grade 2 invasive lobular carcinoma, ER positive (95%, NH positive (95%), HER-2/tanya negative (IHC 0), sentinel lymph node negative ×2.  Status post right mastectomy 8/23/17.  Oncotype DX recurrence score 12 (low risk).  2. Genetics evaluation 7/19/17 with negative Gene DX panel test.  3. Strong family history of thrombosis with no detectable familial hypercoagulable factor.  No personal history of thrombosis.  Hypercoagulable evaluation negative 9/24/15, additional negative evaluation 8/3/17.  Continues chronically on aspirin 81 mg daily.  4. Status post bilateral salpingo-oophorectomy 9/26/17 achieving surgical postmenopausal state.  5. Osteopenia with Baseline DEXA scan 9/21/17 showing maximal T score -1.2 left femoral neck.  Progressive osteopenia on DEXA scan 2/19/2020 (maximal T score -2.1 left hip).  Initiated Fosamax 70 mg weekly on 3/4/2020.  Plan 1 year follow-up DEXA scan.  6. Vitamin D deficiency, treated with high-dose vitamin D 50,000 units weekly ×8 weeks with subsequent transition back to 2400 units daily 5/3/18.  Borderline vitamin D level 3/4/2020 at 30.1, dose increased to 3000 units daily.  7. Initiation of adjuvant Arimidex 1 mg daily 10/10/17.  8. Postmenopausal hot flashes, initiated Effexor XR 37.5 mg daily 10/10/17.  Dose increased to 75 mg daily on 2/2/18.    HISTORY OF PRESENT ILLNESS:  The patient is a 54 y.o. year old female who is here for follow-up with the above-mentioned history.    History of Present Illness the patient returns today in a much delayed follow-up visit, last seen nearly a year ago on 3/19/2019.  She was to have continued on 6-month follow-up visits.  She reports that she has continued on anastrozole 1 mg daily as well as Effexor XR 75 mg daily for hot flashes.  She was to have undergone DEXA scan in September.  She did undergo her annual mammogram on 6/28/2019 which was  negative, BI-RADS 2.  She did see her gynecologist Dr. Bonner on 2020.  She has not yet undergone her follow-up colonoscopy which was due in 2018 and reports that she is going to be scheduling this fairly soon.  She did undergo follow-up DEXA scan on 2020 to review today.  She reports being compliant with calcium and vitamin D supplementation.    The patient reports that she has done fairly well.  She has been exercising and watching her diet and has lost 25 to 30 pounds.  She notes that her hot flashes have improved overall.  She notes some mild insomnia.  She has no other complaints today.      Past Medical History:   Diagnosis Date   • Anxiety    • Asthma    • Breast cancer (CMS/HCC) 2017    Right   • Cancer (CMS/HCC) 2017    Right   • GERD (gastroesophageal reflux disease) 2017   • Herpes     + HSv no outbreaks   • Lichen sclerosus    • Lichen sclerosus et atrophicus 2017   • Migraine    • Osteopenia    • Polyp of colon, adenomatous      Past Surgical History:   Procedure Laterality Date   • BILATERAL BREAST REDUCTION Left 12/15/2017    Procedure: LEFT BREAST REDUCTION ;  Surgeon: Maurine Waterhouse, MD;  Location: [facility] OR;  Service:    • BREAST BIOPSY Right 2017   • BREAST RECONSTRUCTION Right 2017    Procedure: RT TISSUE EXPANDER W/ ALLODERM;  Surgeon: Maurine Waterhouse, MD;  Location: [facility] OR;  Service:    • BREAST TISSUE EXPANDER REMOVAL INSERTION OF IMPLANT Right 12/15/2017    Procedure: RIGHT BREAST TISSUE EXPANDER REMOVAL INSERTION OF IMPLANT;  Surgeon: Maurine Waterhouse, MD;  Location: [facility] OR;  Service:    •  SECTION     • COLONOSCOPY N/A 2017    Procedure: COLONOSCOPY, polypectomy;  Surgeon: Lalita Dubose MD;  Location: [facility] OR;  Service:    • D&C HYSTEROSCOPY N/A 2017    Procedure: DILATATION AND CURETTAGE HYSTEROSCOPY;  Surgeon: Dee Morales MD;  Location: [facility] OR;  Service:    • DIAGNOSTIC LAPAROSCOPY  Bilateral 9/26/2017    Procedure: DIAGNOSTIC LAPAROSCOPY,laproscopic bilateral salpingoophorectomy ;  Surgeon: Dee Morales MD;  Location: [facility] OR;  Service:    • DILATATION AND CURETTAGE     • HAND SURGERY Right    • MASTECTOMY Right 8/23/2017    Procedure: right total mastectomy, right axillary sentinel lymph node biopsy x2 with immediate reconstruction of right breast with expander.;  Surgeon: Dyan Kinsey MD;  Location: [facility] OR;  Service:    • PELVIC LAPAROSCOPY      BSO   • REDUCTION MAMMAPLASTY         ONCOLOGIC HISTORY:  (History from previous dates can be found in the separate document.)    Current Outpatient Medications on File Prior to Visit   Medication Sig Dispense Refill   • anastrozole (ARIMIDEX) 1 MG tablet Take 1 tablet by mouth Daily. 15 tablet 0   • Calcium Carbonate-Vitamin D (CALCIUM PLUS VITAMIN D PO) Take 1,200 mg by mouth 2 (Two) Times a Day.     • cefdinir (OMNICEF) 300 MG capsule cefdinir 300 mg capsule     • clobetasol (TEMOVATE) 0.05 % ointment Apply to affected area as needed for itching. Do not exceed 7 days 30 g 2   • fluticasone-salmeterol (ADVAIR) 100-50 MCG/DOSE DISKUS Inhale 1 puff As Needed (SOB OR WHEEZING).     • gabapentin (NEURONTIN) 400 MG capsule gabapentin 400 mg capsule     • HYDROcodone-acetaminophen (NORCO) 7.5-325 MG per tablet Norco 7.5 mg-325 mg tablet   take one tab po once or twice daily prn for pain     • Influenza Vac Subunit Quad (FLUCELVAX QUADRIVALENT) 0.5 ML suspension prefilled syringe injection Flucelvax Quad 9007-0641 (PF) 60 mcg (15 mcg x 4)/0.5 mL IM syringe     • Multiple Vitamins-Minerals (MULTIVITAMIN ADULT PO) Take  by mouth.     • neomycin-colistin-hydrocortisone-thonzonium (CORTISPORIN-TC) 3.3-3-10-0.5 MG/ML otic suspension Cortisporin-TC 3.3 mg-3 mg-10 mg-0.5 mg/mL ear drops,suspension   Instill 4-5 drops in left ear tid.     • rosuvastatin (CRESTOR) 5 MG tablet Take 5 mg by mouth Every Night.     • venlafaxine XR  (EFFEXOR-XR) 37.5 MG 24 hr capsule venlafaxine ER 37.5 mg capsule,extended release 24 hr   1 cap po qd     • venlafaxine XR (EFFEXOR-XR) 75 MG 24 hr capsule take 1 capsule by mouth once daily 30 capsule 5   • vitamin D (ERGOCALCIFEROL) 41300 units capsule capsule take 1 capsule by mouth every week 8 capsule 0     No current facility-administered medications on file prior to visit.        ALLERGIES:     Allergies   Allergen Reactions   • Sesame Seed [Sesame Oil] Anaphylaxis     Sunflower seed and the food family of it     • Tramadol Unknown - Low Severity     Social History     Socioeconomic History   • Marital status:      Spouse name: Not on file   • Number of children: 1   • Years of education: College   • Highest education level: Not on file   Occupational History   • Occupation:      Employer: [employer]   Tobacco Use   • Smoking status: Former Smoker     Packs/day: 0.50     Years: 10.00     Pack years: 5.00     Types: Cigarettes     Last attempt to quit:      Years since quittin.1   • Smokeless tobacco: Never Used   Substance and Sexual Activity   • Alcohol use: Yes     Comment: Occasional   • Drug use: No   • Sexual activity: Not Currently     Birth control/protection: Surgical, Post-menopausal     Comment: s/p BSO     Family History   Problem Relation Age of Onset   • Breast cancer Mother 70   • Hearing loss Mother    • Hyperlipidemia Mother    • Colon cancer Mother 77   • Diabetes Father    • Asthma Sister    • Clotting disorder Sister    • COPD Sister    • Pulmonary embolism Sister    • Deep vein thrombosis Brother    • Pulmonary embolism Brother    • Protein S deficiency Brother    • Colon cancer Maternal Grandfather 56   • Malig Hyperthermia Neg Hx    • Ovarian cancer Neg Hx         Review of Systems   Constitutional: Negative for activity change, appetite change, fatigue, fever and unexpected weight change.   HENT: Negative for congestion, mouth sores, nosebleeds,  sore throat and voice change.    Respiratory: Negative for cough, shortness of breath and wheezing.    Cardiovascular: Negative for chest pain, palpitations and leg swelling.   Gastrointestinal: Negative for abdominal distention, abdominal pain, blood in stool, constipation, diarrhea, nausea and vomiting.   Endocrine: Positive for heat intolerance. Negative for cold intolerance.   Genitourinary: Negative for difficulty urinating, dysuria, frequency, hematuria and pelvic pain.   Musculoskeletal: Positive for arthralgias. Negative for back pain, joint swelling and myalgias.   Skin: Negative for wound.   Neurological: Negative for dizziness, syncope, weakness, light-headedness, numbness and headaches.   Hematological: Negative for adenopathy. Does not bruise/bleed easily.   Psychiatric/Behavioral: Positive for sleep disturbance. Negative for confusion. The patient is not nervous/anxious.          Objective      Vitals:    03/04/20 1014   BP: 97/67   Pulse: 78   Resp: 16   Temp: 97.8 °F (36.6 °C)   SpO2: 96%      Current Status 3/4/2020   ECOG score 0   Pain 0/10    Physical Exam   Constitutional: She is oriented to person, place, and time. She appears well-developed and well-nourished.   HENT:   Mouth/Throat: Oropharynx is clear and moist.   Eyes: Conjunctivae are normal.   Neck: No thyromegaly present.   Cardiovascular: Normal rate and regular rhythm. Exam reveals no gallop and no friction rub.   No murmur heard.  Pulmonary/Chest: Breath sounds normal. No respiratory distress.   Right mastectomy with implant reconstruction.  No masses no abnormalities identified.  On the left, status post reduction surgery with no masses no abnormalities palpated.   Abdominal: Soft. Bowel sounds are normal. She exhibits no distension. There is no tenderness.   Musculoskeletal: She exhibits no edema.   Lymphadenopathy:        Head (right side): No submandibular adenopathy present.     She has no cervical adenopathy.     She has no  axillary adenopathy.        Right: No inguinal and no supraclavicular adenopathy present.        Left: No inguinal and no supraclavicular adenopathy present.   Neurological: She is alert and oriented to person, place, and time. She displays normal reflexes. No cranial nerve deficit. She exhibits normal muscle tone.   Skin: Skin is warm and dry. No rash noted.   Psychiatric: She has a normal mood and affect. Her behavior is normal.   The patient was examined today, unchanged from above.    RECENT LABS:  Hematology WBC   Date Value Ref Range Status   03/04/2020 7.82 3.40 - 10.80 10*3/mm3 Final     RBC   Date Value Ref Range Status   03/04/2020 4.46 3.77 - 5.28 10*6/mm3 Final     Hemoglobin   Date Value Ref Range Status   03/04/2020 13.1 12.0 - 15.9 g/dL Final     Hematocrit   Date Value Ref Range Status   03/04/2020 40.8 34.0 - 46.6 % Final     Platelets   Date Value Ref Range Status   03/04/2020 252 140 - 450 10*3/mm3 Final        Lab Results   Component Value Date    GLUCOSE 123 03/04/2020    BUN 15 03/04/2020    CREATININE 0.63 03/04/2020    EGFRIFNONA 99 03/04/2020    BCR 23.8 03/04/2020    K 3.9 03/04/2020    CO2 24.8 03/04/2020    CALCIUM 9.1 03/04/2020    ALBUMIN 4.00 03/04/2020    AST 18 03/04/2020    ALT 14 03/04/2020     DEXA scan reviewed from 2/19/2020 as outlined below.  Reviewed mammogram report from 6/28/2019 as well as records from Dr. Bonner office visit.    Assessment/Plan      1. Pathologic stage IA ([id number]) right breast cancer:   · The patient had a mammographically detected nonpalpable abnormality of the right breast measuring 1.3 cm by ultrasound in the 10 o'clock position.   · Biopsy 7/3/17 showed an invasive lobular carcinoma largest size measuring 9 mm, grade 1, ER positive (95%), MA positive (95%), HER-2/tanya 0 (IHC).  There were foci of associated LCIS.  There was no lymphatic nor perineural invasion.    · Oncotype DX testing was sent revealing a recurrence score in a low risk range at 12  (10 year recurrence risk of 8% with treatment with tamoxifen, 1% benefit from adjuvant chemotherapy) and confirmed ER positive, WA positive, HER-2/tanya negative.    · MRI of the breasts 7/13/17 showed the biopsy-proven right breast malignancy measuring 1.9 cm with no axillary lymph node enlargement.  There was however in the middle third of the right breast a cluster of microcalcifications that appear to have increased over time.    · An MRI guided biopsy of this area was performed 7/25/17 in the 9:30 position revealing an invasive lobular carcinoma, 4 mm, grade 2, ER positive (greater than 90%), WA positive (90%), HER-2/tanya negative (1+  IHC) with associated LCIS.    · The patient was seen in the genetics clinic and underwent Gene DX panel testing 7/19/17 which was negative.  She has a family history of malignancy with breast cancer in her mother at age 70.    · The patient saw Dr. Kinsey who discussed surgical management of her disease.  With evidence of multifocal involvement of the breast, it was recommended to undergo a right mastectomy with sentinel lymph node biopsy.  She did see Dr. Waterhouse as well in regards to reconstruction.  · The patient was seen in initial consultation on 8/2/17. Oncotype DX score was in the low risk category at 12 indicating a 10 year recurrence risk of distant metastasis of 8% with tamoxifen alone.  We discussed that adjuvant chemotherapy would provide only approximately a 1% additional benefit.  It was not recommended for her to receive adjuvant chemotherapy.    · Right mastectomy with implant reconstruction and sentinel lymph node biopsy performed on 8/23/17.  Pathology showed a 1.8 cm grade 2 invasive lobular carcinoma with negative margins (greater than 1 cm), extensive associated LCIS and non-atypical ductal hyperplasia, sentinel lymph node negative ×2 with evidence of multiple noncaseating granulomas.    · Therefore, the patient had Stage IA ([id number]) disease.  Again, it  was not recommended for her to receive adjuvant chemotherapy given her low risk Oncotype score.  This would provide only an additional 1% benefit.    · We discussed pursuing adjuvant endocrine therapy.  As she was premenopausal, this would typically be undertaken with tamoxifen.  There was concern however given the patient's significant family history of thrombosis occurring at a young age with no identifiable/traceable risk factor.  Tamoxifen would confer a significant additional risk for her although she has not experienced a personal history of thrombosis.  Also, we discussed that there is suggestion from subgroup analysis of the BIG 1-98 study to suggest a significant additional benefit of aromatase inhibitor therapy over tamoxifen for invasive lobular cancer.    · Therefore, the patient underwent bilateral salpingo-oophorectomy 9/26/17 (pathology benign) producing surgical postmenopausal state.   · Baseline DEXA scan showed mild osteopenia 9/21/17 with a T score in the left femoral neck -1.2.    · On 10/10/17, initiated Arimidex 1 mg daily and Effexor XR 37.5 mg daily.  · Right expander removal and implant placement as well as left breast reduction performed 12/15/17.  · Significant hot flashes prompted increase in Effexor dose to 75 mg daily 2/2/18.  · The patient did not follow-up as scheduled on a six-month basis.  She returns today, last having been seen almost a year ago 3/19/2019.  Fortunately she reports that she has remained on her anastrozole and Effexor or.  She did proceed with her annual mammogram on 6/28/2019 which was negative, BI-RADS 2.  She returns today with follow-up DEXA scan that is 6 months late however we did review the study from 2/19/2020 showing a decline in her bone density into the moderate osteopenic range with T score -2.1 left hip.  This will be discussed further below however we will need to institute treatment with Fosamax.  We will continue on with anastrozole for now.  She has  no clinical evidence of recurrent disease.  Her exam today is negative.  She is already scheduled for her annual mammogram in June 2020 with subsequent follow-up to see Dr. Kinsey shortly after.  We did discuss the need to continue on 6-month interval follow-up while she is on active therapy for the first 5 years.  She expressed understanding.  I will therefore see her back in 6 months for further follow-up and we will anticipate a DEXA scan at a one-year interval.  2. Family history of thrombosis:   · The patient has no personal history of thrombosis.    · She has a significant family history of thrombosis however with a brother who developed a pulmonary embolism in his 30s, a brother developed a DVT at age 44, and a sister with a pulmonary embolism at age 55, all unprovoked.    · The patient herself did undergo a hypercoagulable evaluation performed by her primary care physician at [facility] 9/24/15 which showed negative prothrombin gene mutation analysis, negative factor V Leiden gene mutation analysis, negative lupus anticoagulant, negative anticardiolipin antibodies, normal homocystine, protein C activity 168%, protein S free 107%.   · We did perform additional hypercoagulable labs from 8/3/17 showing protein S free antigen 109%, protein S total antigen 125%, protein S activity 83%, antithrombin III activity 96%, beta-2 GP 1 antibody panel negative.    · Therefore, the patient has no detectable underlying hypercoagulable state.  None of her family members however had a detectable risk factor either.  This does produce some uncertainty in regards to her risk with tamoxifen.    · Therefore was elected to proceed with aromatase inhibitor therapy instead as she became surgically postmenopausal.    · She does continue on aspirin 81 mg a day.    3. Osteopenia:   · The patient had a baseline DEXA scan from 9/21/17 showing mild osteopenia, maximal T score -1.2 and left femoral neck.    · Patient returns  today in follow-up with DEXA scan to review from 2/19/2020 showing a decline in her bone density with maximal T score now -2.1 involving the left hip.  We discussed the need to pursue treatment at this point and I have recommended that she begin Fosamax 70 mg weekly.  We discussed issues regarding osteonecrosis of the jaw.  She has not experienced any recent dental issues.  She is aware that the risk of osteonecrosis is enhanced with any invasive procedures that manipulate bone in the mouth.  The patient is also aware of potential risks regarding esophagitis from Fosamax and we discussed instructions surrounding administration on a weekly basis.  We will plan a repeat DEXA scan in 1 year.  4. Vitamin D deficiency:   · 25 hydroxy vitamin D level on 10/10/17 was very low at 17.8.  The patient was asked to increase vitamin D supplementation to 2400 units daily.    · Vitamin D level was still low at 26.9 on 2/2/18.  High dose vitamin D prescribed 50,000 units weekly ×8 weeks and then returned to her previous dose 2400 units daily.    · Follow-up 25-hydroxy vitamin D level on 5/2/18 was 46.4.    · Level today has declined to 30.1 which is borderline low.  We will ask the patient to increase her vitamin D supplementation to 3000 mg daily.  5. Postmenopausal hot flashes:   · The patient was experiencing these postoperatively from her bilateral salpingo-oophorectomy.   · With initiation of adjuvant aromatase inhibitor therapy on 10/10/17, it was elected to also currently begin Effexor XR 37.5 mg daily.   · Unfortunately this did not adequately control her symptoms and Effexor dose was increased to 75 mg daily 2/2/18.    · The patient notes overall improvement in hot flashes recently.  6. Health maintenance:   · The patient underwent a screening colonoscopy on 11/7/17 at age 52.  2 polyps were removed, benign adenomas.    · The patient's mother was subsequently diagnosed with colon cancer in 2018 at age 77.    · We did  review her previous genetic test profile which did include mismatch repair genes and was negative.    · It was requested by Dr. Tom that she undergo a one year follow-up colonoscopy which was due in November 2018.  · The patient has not yet undergone screening colonoscopy.  This was reiterated by her gynecologist recently and I discussed with her the need to pursue a follow-up study.  She reports that she will schedule this within the next month or so.     Plan:    1. Continue adjuvant Arimidex 1 mg daily  2. Continue Effexor XR dose to 75 mg daily  3. Continue aspirin 81 mg daily  4. Continue vitamin D, increase dose to 3000 units daily.   5. Initiate treatment of osteopenia with Fosamax 70 mg weekly.  6. The patient will be scheduling follow-up colonoscopy in the next month or so  7. Annual left mammogram scheduled for late June 2020 with subsequent visit to see Dr. Kinsey shortly thereafter.  8. M.D. visit in 6 months with CBC, CMP, 25-hydroxy vitamin D level

## 2020-03-05 ENCOUNTER — TELEPHONE (OUTPATIENT)
Dept: ONCOLOGY | Facility: CLINIC | Age: 55
End: 2020-03-05

## 2020-03-05 NOTE — TELEPHONE ENCOUNTER
----- Message from Zhou Gallegos Jr., MD sent at 3/4/2020 10:25 PM EST -----  Please notify patient that vitamin D level was borderline low, we will need to increase her daily vitamin D intake to 3000 units.

## 2020-03-05 NOTE — TELEPHONE ENCOUNTER
Per order of dr. Gallegos, pt. Instructed to increase her vitamin d level to 3000 units as it was borderline low.  Understanding noted.

## 2020-03-25 RX ORDER — ANASTROZOLE 1 MG/1
1 TABLET ORAL DAILY
Qty: 90 TABLET | Refills: 1 | Status: SHIPPED | OUTPATIENT
Start: 2020-03-25 | End: 2020-09-17

## 2020-05-14 RX ORDER — CLOBETASOL PROPIONATE 0.5 MG/G
OINTMENT TOPICAL
Qty: 30 G | Refills: 0 | Status: SHIPPED | OUTPATIENT
Start: 2020-05-14 | End: 2020-09-03

## 2020-06-29 ENCOUNTER — APPOINTMENT (OUTPATIENT)
Dept: MAMMOGRAPHY | Facility: HOSPITAL | Age: 55
End: 2020-06-29

## 2020-06-30 ENCOUNTER — HOSPITAL ENCOUNTER (OUTPATIENT)
Dept: MAMMOGRAPHY | Facility: HOSPITAL | Age: 55
Discharge: HOME | End: 2020-06-30
Admitting: SURGERY

## 2020-06-30 ENCOUNTER — TELEPHONE (OUTPATIENT)
Dept: SURGERY | Facility: CLINIC | Age: 55
End: 2020-06-30

## 2020-06-30 DIAGNOSIS — C50.411 MALIGNANT NEOPLASM OF UPPER-OUTER QUADRANT OF RIGHT BREAST IN FEMALE, ESTROGEN RECEPTOR POSITIVE (HCC): ICD-10-CM

## 2020-06-30 DIAGNOSIS — Z17.0 MALIGNANT NEOPLASM OF UPPER-OUTER QUADRANT OF RIGHT BREAST IN FEMALE, ESTROGEN RECEPTOR POSITIVE (HCC): ICD-10-CM

## 2020-06-30 PROCEDURE — 77067 SCR MAMMO BI INCL CAD: CPT

## 2020-06-30 PROCEDURE — 77063 BREAST TOMOSYNTHESIS BI: CPT

## 2020-07-02 ENCOUNTER — TELEPHONE (OUTPATIENT)
Dept: SURGERY | Facility: CLINIC | Age: 55
End: 2020-07-02

## 2020-07-02 NOTE — TELEPHONE ENCOUNTER
Screening mammogram with 3D dated June 30, 2020 [facility] scattered fibroglandular densities.  BI-RADS 1

## 2020-07-07 ENCOUNTER — OFFICE VISIT (OUTPATIENT)
Dept: SURGERY | Facility: CLINIC | Age: 55
End: 2020-07-07

## 2020-07-07 VITALS
SYSTOLIC BLOOD PRESSURE: 118 MMHG | HEIGHT: 61 IN | TEMPERATURE: 98.7 F | WEIGHT: 162 LBS | BODY MASS INDEX: 30.58 KG/M2 | DIASTOLIC BLOOD PRESSURE: 78 MMHG | HEART RATE: 96 BPM | OXYGEN SATURATION: 97 %

## 2020-07-07 DIAGNOSIS — Z17.0 MALIGNANT NEOPLASM OF UPPER-OUTER QUADRANT OF RIGHT BREAST IN FEMALE, ESTROGEN RECEPTOR POSITIVE (HCC): Primary | ICD-10-CM

## 2020-07-07 DIAGNOSIS — E66.9 OBESITY (BMI 30-39.9): ICD-10-CM

## 2020-07-07 DIAGNOSIS — Z80.3 FH: BREAST CANCER: ICD-10-CM

## 2020-07-07 DIAGNOSIS — C50.411 MALIGNANT NEOPLASM OF UPPER-OUTER QUADRANT OF RIGHT BREAST IN FEMALE, ESTROGEN RECEPTOR POSITIVE (HCC): Primary | ICD-10-CM

## 2020-07-07 DIAGNOSIS — Z82.49 FAMILY HISTORY OF DVT: ICD-10-CM

## 2020-07-07 PROCEDURE — 99213 OFFICE O/P EST LOW 20 MIN: CPT | Performed by: SURGERY

## 2020-07-07 NOTE — PROGRESS NOTES
Chief Complaint: Jessica Suazo is a 54 y.o. female who was seen in consultation at the request of Dee Morales MD  for Malignant neoplasm of upper-outer quadrant of right female breast  and a followup visit    History of Present Illness:  Patient presents with newly diagnosed breast cancer, RIGHT breast. She noted no new masses, skin changes, nipple discharge, nipple changes prior to her most recent imaging.  Her most recent imaging includes the following: June 22, 2017 heterogenous a dense breast tissue.  2 cm mass in the axillary tail the right breast, BI-RADS 0.  Most previous prior was January 11, 2016 screening mammogram that showed bilateral calcifications right greater than left with no change.  BI-RADS 2.  She came back June 28, 2017 for a right breast ultrasound that showed at the 10:00 location a mass in the right breast measuring 1.3 cm, BI-RADS 5.  July 3, 2017 underwent an ultrasound-guided biopsy for cores a 14-gauge.  Marker left at the medial margin of the lesion.  Pathology returned as invasive lobular carcinoma, low grade, 3, 1, 1, foci of LCIS, no lymphovascular invasion, estrogen 95 progesterone 95 and HER-2/tanya 0.    I then had her get a bilateral breast MRI at [facility] July 14, 2017.  Right breast 10:00, 9 cm from the nipple a 1.9 x 1.7 cm mass with a clip at the posterior margin.  Bilateral short scattered segmental enhancement.  Reviewed outside mammogram and felt that the calcifications are greater on the right than the left.  Also some areas of short segment enhancement right breast.  Recommend sampling either of calcifications or enhancement.  I discussed this in person with Dr. Barron and this was his assessment.    She has not had a breast biopsy in the past.  She has her uterus and ovaries, is premenopausal, and takes no hormones.  Her family history includes the following: She has one son who is 20.  She has one sister, 7 brothers, no maternal aunts, 4  paternal aunts.  She has 2 brothers and 1 sister who had DVTs and/or pulmonary emboli.  Her mother had breast cancer age 70, no other breast or ovarian cancer in her family.    Oncotype DX score dated July 25, 2017 returned as low risk score of 12 with a 10 year risk of distant recurrence on tamoxifen alone of 8%      Genetic counseling on Jessica Suazo dated July 19, 2017-Gene DX multi-gene panel was sent. Negative for mutation.  Pathology from RIGHT total mastectomy and RIGHTSLNB with immediate recosntruction Dr Waterhouse returned as   In situ and invasive lobular carcinoma, intermediate grade, 3, 2, 1, no duct carcinoma in situ present but lobular carcinoma in situ was present.  Greatest dimension of invasive carcinoma 1.8 cm.  Margins are uninvolved with closest margin greater than 1 cm.  0 of 2 lymph nodes involved. Extensive non-atypical duct hyperplasia and LCIS in the mastectomy specimen.   Pathologic stage TI CN 0.   She has had one of HER-2 drains removed.  She denies any redness warmth from her incision.      In the interim, Jessica Suazo  has done well.  She had a BSO with Dr Morales 9-2017.  Dr Gallegos started arimidex 10-10-17. She then started effexor and vitamin D.  She had her expander exchanged for implant on the RIGHT and a LEFT reduction in  with Dr Waterhouse (12-15-17).    She has noted no changes in her breast exam. No new masses, skin changes, nipple changes, nipple discharge LEFT  breast.  No nodules or discolorations RIGHT reconstructed side.    She denies headache, bone pain, belly pain, cough, changes in vision or gait.    Her most recent imaging includes the following:  [facility] left screening mammogram with 3-D: Heterogenouslydense parenchyma.  Mild architectural distortion left breast related to surgery.  BI-RADS 2.    Interval HIstory:  In the interim, Jessica Suazo  has done well.  She has noted no changes in her breast exam. No new masses, skin changes,  nipple changes, nipple discharge LEFT  breast.  No nodules or discolorations RIGHT reconstructed side.    She denies headache, bone pain, belly pain, cough, changes in vision or gait.    She has lost 25 pounds intentionally with increased exercise walking her dog and portion control.  She is not yet seen Dr. Waterhouse back for fat grafting or nipple reconstruction.  She continues the Arimidex and sees Dr. Gallegos every 6 months.    Her most recent imaging includes the following:  [facility] June 28, 2019 bilateral screening mammogram with 3D.  Scattered fibroglandular densities.  No evidence of malignancy BI-RADS 2.    Review of Systems:  Review of Systems   Constitutional: Negative for unexpected weight change (20 LB WT LOSS).   All other systems reviewed and are negative.       Past Medical and Surgical History:  Breast Biopsy History:  Patient had not had a breast biopsy prior to her cancer diagnosis.  Breast Cancer HIstory:  Patient does not have a past medical history of breast cancer.  Breast Operations, and year:  None   Obstetric/Gynecologic History:  Age menstrual periods began: 14  Patient is premenopausal, first day of last period: 7/1/2017  Number of pregnancies:1  Number of live births: 1  Number of abortions or miscarriages: 0  Age of delivery of first child: 29  Patient did not breast feed.  Length of time taking birth control pills:6 yrs   Patient has never taken hormone replacement  Patient still has uterus and ovaries.     Past Surgical History:   Procedure Laterality Date   • BILATERAL BREAST REDUCTION Left 12/15/2017    Procedure: LEFT BREAST REDUCTION ;  Surgeon: Maurine Waterhouse, MD;  Location: [facility];  Service:    • BREAST BIOPSY Right 2017   • BREAST RECONSTRUCTION Right 8/23/2017    Procedure: RT TISSUE EXPANDER W/ ALLODERM;  Surgeon: Maurine Waterhouse, MD;  Location: [facility];  Service:    • BREAST TISSUE EXPANDER REMOVAL INSERTION OF IMPLANT Right 12/15/2017     Procedure: RIGHT BREAST TISSUE EXPANDER REMOVAL INSERTION OF IMPLANT;  Surgeon: Maurine Waterhouse, MD;  Location: [facility] OR;  Service:    •  SECTION     • COLONOSCOPY N/A 2017    Procedure: COLONOSCOPY, polypectomy;  Surgeon: Lalita Dubose MD;  Location: [facility] OR;  Service:    • D&C HYSTEROSCOPY N/A 2017    Procedure: DILATATION AND CURETTAGE HYSTEROSCOPY;  Surgeon: Dee Morales MD;  Location: [facility] OR;  Service:    • DIAGNOSTIC LAPAROSCOPY Bilateral 2017    Procedure: DIAGNOSTIC LAPAROSCOPY,laproscopic bilateral salpingoophorectomy ;  Surgeon: Dee Morales MD;  Location: [facility] OR;  Service:    • DILATATION AND CURETTAGE     • HAND SURGERY Right    • MASTECTOMY Right 2017    Procedure: right total mastectomy, right axillary sentinel lymph node biopsy x2 with immediate reconstruction of right breast with expander.;  Surgeon: Dyan Kinsey MD;  Location: [facility];  Service:    • PELVIC LAPAROSCOPY      BSO   • REDUCTION MAMMAPLASTY       Past Medical History:   Diagnosis Date   • Anxiety    • Asthma    • Breast cancer (CMS/HCC) 2017    Right   • Cancer (CMS/HCC) 2017    Right   • GERD (gastroesophageal reflux disease) 2017   • Herpes     + HSv no outbreaks   • Lichen sclerosus    • Lichen sclerosus et atrophicus 2017   • Migraine    • Osteopenia    • Polyp of colon, adenomatous        Prior Hospitalizations, other than for surgery or childbirth, and year:  None     Social History     Socioeconomic History   • Marital status:      Spouse name: Not on file   • Number of children: 1   • Years of education: College   • Highest education level: Not on file   Occupational History   • Occupation:      Employer: [employer]   Tobacco Use   • Smoking status: Former Smoker     Packs/day: 0.50     Years: 10.00     Pack years: 5.00     Types: Cigarettes     Last attempt to quit:      Years since quitting:  "13.5   • Smokeless tobacco: Never Used   Substance and Sexual Activity   • Alcohol use: Yes     Comment: Occasional   • Drug use: No   • Sexual activity: Not Currently     Birth control/protection: Surgical, Post-menopausal     Comment: s/p BSO     Patient is .  Patient is employed full time with the following occupation:   drinks caffeine    Family History:  Family History   Problem Relation Age of Onset   • Breast cancer Mother 70   • Hearing loss Mother    • Hyperlipidemia Mother    • Colon cancer Mother 77   • Diabetes Father    • Asthma Sister    • Clotting disorder Sister    • COPD Sister    • Pulmonary embolism Sister    • Deep vein thrombosis Brother    • Pulmonary embolism Brother    • Protein S deficiency Brother    • Colon cancer Maternal Grandfather 56   • Malig Hyperthermia Neg Hx    • Ovarian cancer Neg Hx        Vital Signs:  /78   Pulse 96   Temp 98.7 °F (37.1 °C) (Temporal)   Ht 154.9 cm (61\")   Wt 73.5 kg (162 lb)   LMP  (LMP Unknown)   SpO2 97%   Breastfeeding No   BMI 30.61 kg/m²      Medications:    Current Outpatient Medications:   •  alendronate (FOSAMAX) 70 MG tablet, Take 1 tablet by mouth Every 7 (Seven) Days., Disp: 4 tablet, Rfl: 5  •  anastrozole (ARIMIDEX) 1 MG tablet, TAKE 1 TABLET BY MOUTH DAILY, Disp: 90 tablet, Rfl: 1  •  Calcium Carbonate-Vitamin D (CALCIUM PLUS VITAMIN D PO), Take 1,200 mg by mouth 2 (Two) Times a Day., Disp: , Rfl:   •  cefdinir (OMNICEF) 300 MG capsule, cefdinir 300 mg capsule, Disp: , Rfl:   •  clobetasol (TEMOVATE) 0.05 % ointment, Apply to affected area as needed for itching. Do not exceed 7 days, Disp: 30 g, Rfl: 0  •  fluticasone-salmeterol (ADVAIR) 100-50 MCG/DOSE DISKUS, Inhale 1 puff As Needed (SOB OR WHEEZING)., Disp: , Rfl:   •  gabapentin (NEURONTIN) 400 MG capsule, gabapentin 400 mg capsule, Disp: , Rfl:   •  HYDROcodone-acetaminophen (NORCO) 7.5-325 MG per tablet, Norco 7.5 mg-325 mg tablet  take one tab po once or " "twice daily prn for pain, Disp: , Rfl:   •  Influenza Vac Subunit Quad (FLUCELVAX QUADRIVALENT) 0.5 ML suspension prefilled syringe injection, Flucelvax Quad [id number] (PF) 60 mcg (15 mcg x 4)/0.5 mL IM syringe, Disp: , Rfl:   •  Multiple Vitamins-Minerals (MULTIVITAMIN ADULT PO), Take  by mouth., Disp: , Rfl:   •  neomycin-colistin-hydrocortisone-thonzonium (CORTISPORIN-TC) 3.3-3-10-0.5 MG/ML otic suspension, Cortisporin-TC 3.3 mg-3 mg-10 mg-0.5 mg/mL ear drops,suspension  Instill 4-5 drops in left ear tid., Disp: , Rfl:   •  rosuvastatin (CRESTOR) 5 MG tablet, Take 5 mg by mouth Every Night., Disp: , Rfl:   •  venlafaxine XR (EFFEXOR-XR) 37.5 MG 24 hr capsule, venlafaxine ER 37.5 mg capsule,extended release 24 hr  1 cap po qd, Disp: , Rfl:   •  venlafaxine XR (EFFEXOR-XR) 75 MG 24 hr capsule, take 1 capsule by mouth once daily, Disp: 30 capsule, Rfl: 5  •  vitamin D (ERGOCALCIFEROL) 95989 units capsule capsule, take 1 capsule by mouth every week, Disp: 8 capsule, Rfl: 0     Allergies:  Allergies   Allergen Reactions   • Sesame Seed [Sesame Oil] Anaphylaxis     Sunflower seed and the food family of it     • Tramadol Unknown - Low Severity       Physical Examination:  /78   Pulse 96   Temp 98.7 °F (37.1 °C) (Temporal)   Ht 154.9 cm (61\")   Wt 73.5 kg (162 lb)   LMP  (LMP Unknown)   SpO2 97%   Breastfeeding No   BMI 30.61 kg/m²   General Appearance:  Patient is in no distress.  She is well kept and has an obese build.   Psychiatric:  Patient with appropriate mood and affect. Alert and oriented to self, time, and place.    Breast, RIGHT:  Surgically absent with implant in place. Well healed transverse incision.  No skin nodules or discolorations.    Breast, LEFT:  medium sized, asymmetric with the contralateral surgically absent side.  Breast skin is without erythema, edema, rashes.  There are no visible abnormalities upon inspection during the arm-raising maneuver or with hands on hips in the sitting " position. There is no nipple retraction, discharge or nipple/areolar skin changes.There are no masses palpable in the sitting or supine positions. Well healed reduction pattern incisions.    Lymphatic:  There is no axillary, cervical, infraclavicular, or supraclavicular adenopathy bilaterally.  Eyes:  Pupils are round and reactive to light.  Cardiovascular:  Heart rate and rhythm are regular.  Respiratory:  Lungs are clear bilaterally with no crackles or wheezes in any lung field.  Gastrointestinal:  Abdomen is soft, nondistended, and nontender.   Musculoskeletal:  Good strength in all 4 extremities.   There is good range of motion in both shoulders.    Skin:  No new skin lesions or rashes on the skin excluding the breast (see breast exam above).        Imagin/5/14    SCREENING MAMMOGRAM CLARA LYNCH  Heterogeneously dense. BIRADS CATEGORY 2: Benign    16   BILATERAL SCREENING MAMMOGRAM CLARA LYNCH  Multiple small benign-appearing calcifications bilaterally, more numerous on the right. Unchanged.  BIRADS CATEGORY 2    17  MAMMO SCREENING TOMOSYNTHESIS BILATERAL   KAITLYN LYNCHIA  The breasts are heterogeneously dense. Suspicious irregular mass axillary portion of the right breast measuring 2 cm. This is a new finding. Ultrasound examination is recommended.  BIRADS CATEGORY 0    17  US BREAST RIGHT CLARA LYNCH  Suspicious hypoechoic, irregular, shadowing mass at the 10:00 position right breast. Measures at least 1.3 cm.  BI-RADS CATEGORY 5    17  [facility] MRI BREAST BILATERAL   KAITLYN LYNCHIA   Right breast 10-o’clock 9 cm of the nipple enhancing mass that measures 1.9 cm in the anterior to posterior, 1.7 cm in the superior to inferior and 1.6 cm in the medial to lateral. A metallic clip is located along the posterior margin. No evidence for axillary adenopathy in either axilla. There is short segment linear enhancement that is scattered in both breasts and is  below threshold. However, I do not have a recent mammogram for comparison. I recommend that a mammogram obtained in the last 6 months be pronounced for comparison to assess for the resented of any calcifications in the region which could warrant biopsy.   BIRADS Category 0    7/14/17  [facility]  ADDENDUM, BILATERAL BREAST MRI CLARA LYNCH  There are calcifications seen in an asymmetric distribution between the right and left breast. The preponderance of calcifications in the right breast relative to the left breast can be seen dating back to the prior examinations of 06/04/2014. In the middle third of the right breast at 12 o’clock position these is a cluster if microcalcifications that appears to have slightly increased in number over the prior years. Correlation with a stereotactic guided right breast biopsy of these calcifications is recommended. I recommend that the patient undergo a breast MRI examination in one year and 6 months mammographic follow-up of both breasts.     [facility] left screening mammogram with 3-D: Heterogenous Shankar dense parenchyma.  Mild architectural distortion left breast related to surgery.  BI-RADS 2.    [facility] June 28, 2019 bilateral screening mammogram with 3D.  Scattered fibroglandular densities.  No evidence of malignancy BI-RADS 2.    Screening mammogram with 3D dated June 30, 2020 [facility] scattered fibroglandular densities.  BI-RADS 1      Pathology:  7/3/17     US GUIDED BREAST BIOPSY  CLARA LYNCH  ULTRASOUND-GUIDED CORE NEEDLE BIOPSY, RIGHT. CLIP MARKER PLACEMENT.  Biopsied 4 times 14 gauge Achieve. A clip marker was deployed. Postbiopsy mammogram shows the clip marker along the medial margin of the lesion.  7/3/17  [facility] PATHOLOGY  CLARA LYNCH RIGHT BREAST, CORE BIOPSY: INVASIVE LOBULAR CARCINOMA   0.9 cm. Score I/III (tubules=3, nuclei=1, mitosis=1).  Foci of lobular carcinoma in situ (LCIS)  "identified. No capillary lymphatic nor perineural invasions identified.  ESTROGEN RECEPTOR          95%  PROGESTERONE RECEPTOR        95%  Her2     0    07/25/17  [facility]     PATHOLOGY  JESSICA SUAZO   Final Diagnosis   BREAST, RIGHT, DESIGNATED \"9:30\" CORE BIOPSY:              INVASIVE LOBULAR CARCINOMA WITH ASSOCIATED LOBULAR CARCINOMA IN SITU.              PREDICTED KELLY SCORE: TUBULAR SCORE 3, NUCLEAR SCORE 2, MITOTIC SCORE 1;                       OVERALL GRADE 2 (SCORE 6 OF 9).              MAXIMUM MEASURED LENGTH OF INVASIVE CARCINOMA IN A SINGLE CORE IS 4 MM.              FIBROCYSTIC CHANGES WITH DUCT DILATATION AND STASIS AND APOCRINE METAPLASIA.               FOCAL FIBROADENOMATOID CHANGE.              FOCAL DUCTAL HYPERPLASIA OF THE USUAL TYPE.      COMMENT: The above diagnosis is supported by e-cadherin immunohistochemistry.  ER, OH, and HER-2/tanya studies will be performed with results to be reported in an addendum.      08/23/17 [facility]  SURGICAL PATHOLOGY  JESSICA SUAZO   Final Diagnosis   1.  SENTINEL LYMPH NODE #1, RIGHT AXILLA, EXCISIONAL SPECIMEN (ONE NODE):                          NO TUMOR IDENTIFIED.     2.  SENTINEL LYMPH NODE #2,  RIGHT AXILLA, EXCISIONAL SPECIMEN (ONE NODE):                         NO TUMOR IDENTIFIED                         MULTIPLE NON-NECROTIZING GRANULOMAS.               NEGATIVE STAINS FOR FUNGAL AND ACID FAST ORGANISMS (with appropriate controls).     3.  RIGHT BREAST, TOTAL MASTECTOMY SPECIMEN:                         IN SITU AND INVASIVE LOBULAR CARCINOMA.                         NEGATIVE MARGINS.                         NO ATTACHED LYMPH NODES OR SKELETAL MUSCLE.                         NEGATIVE SKIN AND NIPPLE.               EXTENSIVE LCIS AND NON-ATYPICAL DUCT HYPERPLASIA.                         (SEE SYNOPTIC REPORT.)     DAYO/brb/th  IHC/a/TDJ     from genetic counseling on Jessica Suazo dated July 19, 2017 at the time of appointment she expressed an " interest in pursuing testing and so a Gene DX multi-gene panel was sent.  Results pending.    Procedures:    Assessment:   Diagnosis Plan   1. Malignant neoplasm of upper-outer quadrant of right breast in female, estrogen receptor positive (CMS/HCC)     2. FH: breast cancer     3. Obesity (BMI 30-39.9)     4. Family history of DVT        1-  RIGHT 10:00 7.5 CFN- on exam 3 x 1.75 cm, on ultrasound 1.3 cm, on MRI 1.9 cm. Normal nodes on exam and MRI.  Inv lobular carcinoma, low grade, 3,1,1, foci LCIS, no LVI, ER 95, RI 95, her 2 tanya 0    MRI areas short segmental enhancement RIGHT- of indeterminate significance--Biopsy results returned as INvasive lobular carcinoma with LCIS, int grade, t3n2m1, max length 4mm. We will have her come back to discuss. I reviewed imagign w Dr Barron again and she will likely need a mastectomy.  Dr. Barron's procedure note is that the recent biopsy and the current biopsy R4 centimeters apart.  The most recent clip is 4 cm inferior and anterior to the previous biopsy site.    Clinical stage [id number]- IA    Pathology from RIGHT total mastectomy and RIGHT SLNB with immediate reconstruction Dr Waterhouse returned as   In situ and invasive lobular carcinoma, intermediate grade, 3, 2, 1, no duct carcinoma in situ present but lobular carcinoma in situ was present.  Greatest dimension of invasive carcinoma 1.8 cm.  Margins are uninvolved with closest margin greater than 1 cm.  0 of 2 lymph nodes involved. Extensive non-atypical duct hyperplasia and LCIS in the mastectomy specimen.   Pathologic stage TI CN 0.      Oncotype DX score dated July 25, 2017 returned as low risk score of 12 with a 10 year risk of distant recurrence on tamoxifen alone of 8%  - will not need xrt  - Dr Morales BSO 9-2017 (bc of FH clotting and couldn't take tamoxifen for DVT risk)  - Dr Gallegos- arimidex 10-10-17  - 12-15-17 Dr Waterhouse RIGHT implant exchange and LEFT mastopexy    2-  Mom age 70  Genetic counseling on  Jessica Suazo dated July 19, 2017-Gene DX multi-gene panel was sent. Negative for mutation.    3-  BMI 37    4-  2 brothers and one sister with DVT and/or PE  Patient's and her siblings hypercoag workup normal- Dr Gallegos    Plan:   is doing well today at her 3 year followup.  We reviewed her interval history, exam, imaging and imaging reports together today.    Her exam and imaging are in good order.    I cautioned her about regaining 10 of her pounds of weight.  We discussed the adverse effect of weight gain on breast cancer prognosis.  I offered for her to see our nutritionist but she politely declined and stated that she will work on a healthy diet and activity herself.  She knows that part of this is from the reduction in activity related to COVID.    I gave her a card for Luisa Kleinert in case she decides she would like to have a 3D nipple tattoo on the right.    We discussed her surveillance.   I have not given her a routine follow-up in our office.    She will be due her left mammogram at [facility] July 1, 2021. I gave her this reminder today.    I asked her to continue her self breast exam and to call us in the future with concerns and we would be happy to see her back.    Dyan Kinsey MD    We spent 15 min in visit, 9 in face to face     Next Appointment:  Return for any future concerns.      EMR Dragon/transcription disclaimer:    Much of this encounter note is an electronic transcription/translocation of spoken language to printed text.  The electronic translation of spoken language may permit erroneous, or at times, nonsensical words or phrases to be inadvertently transcribed.  Although I have reviewed the note from such areas, some may still exist.

## 2020-08-19 ENCOUNTER — OFFICE VISIT (OUTPATIENT)
Dept: ONCOLOGY | Facility: CLINIC | Age: 55
End: 2020-08-19

## 2020-08-19 ENCOUNTER — LAB (OUTPATIENT)
Dept: LAB | Facility: HOSPITAL | Age: 55
End: 2020-08-19

## 2020-08-19 VITALS
RESPIRATION RATE: 16 BRPM | OXYGEN SATURATION: 98 % | BODY MASS INDEX: 32.23 KG/M2 | SYSTOLIC BLOOD PRESSURE: 120 MMHG | WEIGHT: 170.7 LBS | TEMPERATURE: 98.2 F | DIASTOLIC BLOOD PRESSURE: 61 MMHG | HEIGHT: 61 IN | HEART RATE: 79 BPM

## 2020-08-19 DIAGNOSIS — E55.9 VITAMIN D DEFICIENCY DISEASE: ICD-10-CM

## 2020-08-19 DIAGNOSIS — Z17.0 MALIGNANT NEOPLASM OF UPPER-OUTER QUADRANT OF RIGHT BREAST IN FEMALE, ESTROGEN RECEPTOR POSITIVE (HCC): Primary | ICD-10-CM

## 2020-08-19 DIAGNOSIS — C50.411 MALIGNANT NEOPLASM OF UPPER-OUTER QUADRANT OF RIGHT BREAST IN FEMALE, ESTROGEN RECEPTOR POSITIVE (HCC): Primary | ICD-10-CM

## 2020-08-19 DIAGNOSIS — Z17.0 MALIGNANT NEOPLASM OF UPPER-OUTER QUADRANT OF RIGHT BREAST IN FEMALE, ESTROGEN RECEPTOR POSITIVE (HCC): ICD-10-CM

## 2020-08-19 DIAGNOSIS — C50.411 MALIGNANT NEOPLASM OF UPPER-OUTER QUADRANT OF RIGHT BREAST IN FEMALE, ESTROGEN RECEPTOR POSITIVE (HCC): ICD-10-CM

## 2020-08-19 LAB
25(OH)D3 SERPL-MCNC: 31.2 NG/ML (ref 30–100)
ALBUMIN SERPL-MCNC: 4 G/DL (ref 3.5–5.2)
ALBUMIN/GLOB SERPL: 1.4 G/DL (ref 1.1–2.4)
ALP SERPL-CCNC: 99 U/L (ref 38–116)
ALT SERPL W P-5'-P-CCNC: 12 U/L (ref 0–33)
ANION GAP SERPL CALCULATED.3IONS-SCNC: 8.6 MMOL/L (ref 5–15)
AST SERPL-CCNC: 16 U/L (ref 0–32)
BASOPHILS # BLD AUTO: 0.09 10*3/MM3 (ref 0–0.2)
BASOPHILS NFR BLD AUTO: 1.3 % (ref 0–1.5)
BILIRUB SERPL-MCNC: 0.3 MG/DL (ref 0.2–1.2)
BUN SERPL-MCNC: 19 MG/DL (ref 6–20)
BUN/CREAT SERPL: 27.1 (ref 7.3–30)
CALCIUM SPEC-SCNC: 9.4 MG/DL (ref 8.5–10.2)
CHLORIDE SERPL-SCNC: 105 MMOL/L (ref 98–107)
CHROMATIN AB SERPL-ACNC: <10 IU/ML (ref 0–14)
CO2 SERPL-SCNC: 26.4 MMOL/L (ref 22–29)
CREAT SERPL-MCNC: 0.7 MG/DL (ref 0.6–1.1)
CRP SERPL-MCNC: 0.24 MG/DL (ref 0–0.5)
DEPRECATED RDW RBC AUTO: 42.9 FL (ref 37–54)
EOSINOPHIL # BLD AUTO: 0.55 10*3/MM3 (ref 0–0.4)
EOSINOPHIL NFR BLD AUTO: 7.8 % (ref 0.3–6.2)
ERYTHROCYTE [DISTWIDTH] IN BLOOD BY AUTOMATED COUNT: 13.1 % (ref 12.3–15.4)
ERYTHROCYTE [SEDIMENTATION RATE] IN BLOOD: 10 MM/HR (ref 0–30)
GFR SERPL CREATININE-BSD FRML MDRD: 88 ML/MIN/1.73
GLOBULIN UR ELPH-MCNC: 2.8 GM/DL (ref 1.8–3.5)
GLUCOSE SERPL-MCNC: 135 MG/DL (ref 74–124)
HCT VFR BLD AUTO: 42.1 % (ref 34–46.6)
HGB BLD-MCNC: 13.4 G/DL (ref 12–15.9)
IMM GRANULOCYTES # BLD AUTO: 0.03 10*3/MM3 (ref 0–0.05)
IMM GRANULOCYTES NFR BLD AUTO: 0.4 % (ref 0–0.5)
LYMPHOCYTES # BLD AUTO: 2.12 10*3/MM3 (ref 0.7–3.1)
LYMPHOCYTES NFR BLD AUTO: 30.1 % (ref 19.6–45.3)
MCH RBC QN AUTO: 28.6 PG (ref 26.6–33)
MCHC RBC AUTO-ENTMCNC: 31.8 G/DL (ref 31.5–35.7)
MCV RBC AUTO: 89.8 FL (ref 79–97)
MONOCYTES # BLD AUTO: 0.68 10*3/MM3 (ref 0.1–0.9)
MONOCYTES NFR BLD AUTO: 9.6 % (ref 5–12)
NEUTROPHILS NFR BLD AUTO: 3.58 10*3/MM3 (ref 1.7–7)
NEUTROPHILS NFR BLD AUTO: 50.8 % (ref 42.7–76)
NRBC BLD AUTO-RTO: 0 /100 WBC (ref 0–0.2)
PLATELET # BLD AUTO: 251 10*3/MM3 (ref 140–450)
PMV BLD AUTO: 9.3 FL (ref 6–12)
POTASSIUM SERPL-SCNC: 4.2 MMOL/L (ref 3.5–4.7)
PROT SERPL-MCNC: 6.8 G/DL (ref 6.3–8)
RBC # BLD AUTO: 4.69 10*6/MM3 (ref 3.77–5.28)
SODIUM SERPL-SCNC: 140 MMOL/L (ref 134–145)
WBC # BLD AUTO: 7.05 10*3/MM3 (ref 3.4–10.8)

## 2020-08-19 PROCEDURE — 80053 COMPREHEN METABOLIC PANEL: CPT

## 2020-08-19 PROCEDURE — 99214 OFFICE O/P EST MOD 30 MIN: CPT | Performed by: INTERNAL MEDICINE

## 2020-08-19 PROCEDURE — 82306 VITAMIN D 25 HYDROXY: CPT | Performed by: INTERNAL MEDICINE

## 2020-08-19 PROCEDURE — 85652 RBC SED RATE AUTOMATED: CPT | Performed by: INTERNAL MEDICINE

## 2020-08-19 PROCEDURE — 36415 COLL VENOUS BLD VENIPUNCTURE: CPT

## 2020-08-19 PROCEDURE — 86431 RHEUMATOID FACTOR QUANT: CPT | Performed by: INTERNAL MEDICINE

## 2020-08-19 PROCEDURE — 86140 C-REACTIVE PROTEIN: CPT | Performed by: INTERNAL MEDICINE

## 2020-08-19 PROCEDURE — 85025 COMPLETE CBC W/AUTO DIFF WBC: CPT

## 2020-08-19 RX ORDER — METHYLPREDNISOLONE 4 MG/1
TABLET ORAL
Qty: 1 EACH | Refills: 0 | Status: SHIPPED | OUTPATIENT
Start: 2020-08-19 | End: 2020-09-17

## 2020-08-20 ENCOUNTER — TELEPHONE (OUTPATIENT)
Dept: ONCOLOGY | Facility: HOSPITAL | Age: 55
End: 2020-08-20

## 2020-08-20 LAB
CENTROMERE B AB SER-ACNC: <0.2 AI (ref 0–0.9)
CHROMATIN AB SERPL-ACNC: <0.2 AI (ref 0–0.9)
DSDNA AB SER-ACNC: <1 IU/ML (ref 0–9)
ENA JO1 AB SER-ACNC: <0.2 AI (ref 0–0.9)
ENA RNP AB SER-ACNC: <0.2 AI (ref 0–0.9)
ENA SCL70 AB SER-ACNC: <0.2 AI (ref 0–0.9)
ENA SM AB SER-ACNC: <0.2 AI (ref 0–0.9)
ENA SS-A AB SER-ACNC: <0.2 AI (ref 0–0.9)
ENA SS-B AB SER-ACNC: <0.2 AI (ref 0–0.9)
Lab: NORMAL

## 2020-08-20 NOTE — TELEPHONE ENCOUNTER
Called pt, no answer, left message to call back.       ----- Message from Zhou Gallegos Jr., MD sent at 8/19/2020  9:34 PM EDT -----  Please ask patient to increase vitamin D dose to 5000 units daily.  ----- Message -----  From: Lab, Background User  Sent: 8/19/2020  10:14 AM EDT  To: Zhou Gallegos Jr., MD

## 2020-08-20 NOTE — PROGRESS NOTES
Subjective .     REASONS FOR FOLLOWUP:    1. Stage IA ([id number]) right breast cancer: 1.8 cm grade 2 invasive lobular carcinoma, ER positive (95%, OK positive (95%), HER-2/tanya negative (IHC 0), sentinel lymph node negative ×2.  Status post right mastectomy 8/23/17.  Oncotype DX recurrence score 12 (low risk).  2. Genetics evaluation 7/19/17 with negative Gene DX panel test.  3. Strong family history of thrombosis with no detectable familial hypercoagulable factor.  No personal history of thrombosis.  Hypercoagulable evaluation negative 9/24/15, additional negative evaluation 8/3/17.  Continues chronically on aspirin 81 mg daily.  4. Status post bilateral salpingo-oophorectomy 9/26/17 achieving surgical postmenopausal state.  5. Osteopenia with Baseline DEXA scan 9/21/17 showing maximal T score -1.2 left femoral neck.  Progressive osteopenia on DEXA scan 2/19/2020 (maximal T score -2.1 left hip).  Initiated Fosamax 70 mg weekly on 3/4/2020.  Plan 1 year follow-up DEXA scan.  6. Vitamin D deficiency, treated with high-dose vitamin D 50,000 units weekly ×8 weeks with subsequent transition back to 2400 units daily 5/3/18.  Borderline vitamin D level 3/4/2020 at 30.1, dose increased to 3000 units daily.  7. Initiation of adjuvant Arimidex 1 mg daily 10/10/17.  8. Postmenopausal hot flashes, initiated Effexor XR 37.5 mg daily 10/10/17.  Dose increased to 75 mg daily on 2/2/18.    HISTORY OF PRESENT ILLNESS:  The patient is a 54 y.o. year old female who is here for follow-up with the above-mentioned history.    History of Present Illness returns today in follow-up continuing on adjuvant anastrozole 1 mg daily as well as Effexor XR 75 mg daily for hot flashes.  The patient did undergo recent mammogram on 6/30/2020 with negative findings, BI-RADS 1 on the left side.  She does continue on Fosamax as treatment for her osteopenia, initiated on 3/4/2020.  She has tolerated this well with no significant side effects.  She is  continuing on vitamin D replacement at 3000 units daily.  The patient was to have undergone follow-up colonoscopy however has not yet pursued this and is requesting a referral to GI.  She has had difficulty over the past month with onset of swelling in her left wrist.  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  She reports that her hot flashes are tolerable.      Past Medical History:   Diagnosis Date   • Anxiety    • Asthma    • Breast cancer (CMS/HCC) 2017    Right   • Cancer (CMS/HCC) 2017    Right   • GERD (gastroesophageal reflux disease) 2017   • Herpes     + HSv no outbreaks   • Lichen sclerosus    • Lichen sclerosus et atrophicus 2017   • Migraine    • Osteopenia    • Polyp of colon, adenomatous      Past Surgical History:   Procedure Laterality Date   • BILATERAL BREAST REDUCTION Left 12/15/2017    Procedure: LEFT BREAST REDUCTION ;  Surgeon: Maurine Waterhouse, MD;  Location: [facility] OR;  Service:    • BREAST BIOPSY Right 2017   • BREAST RECONSTRUCTION Right 2017    Procedure: RT TISSUE EXPANDER W/ ALLODERM;  Surgeon: Maurine Waterhouse, MD;  Location: [facility] OR;  Service:    • BREAST TISSUE EXPANDER REMOVAL INSERTION OF IMPLANT Right 12/15/2017    Procedure: RIGHT BREAST TISSUE EXPANDER REMOVAL INSERTION OF IMPLANT;  Surgeon: Maurine Waterhouse, MD;  Location: [facility] OR;  Service:    •  SECTION     • COLONOSCOPY N/A 2017    Procedure: COLONOSCOPY, polypectomy;  Surgeon: Lalita Dubose MD;  Location: [facility] OR;  Service:    • D&C HYSTEROSCOPY N/A 2017    Procedure: DILATATION AND CURETTAGE HYSTEROSCOPY;  Surgeon: Dee Morales MD;  Location: [facility] OR;  Service:    • DIAGNOSTIC LAPAROSCOPY Bilateral 2017    Procedure: DIAGNOSTIC LAPAROSCOPY,laproscopic bilateral salpingoophorectomy ;  Surgeon: Dee Morales MD;  Location: [facility] OR;  Service:    • DILATATION AND  CURETTAGE     • HAND SURGERY Right    • MASTECTOMY Right 8/23/2017    Procedure: right total mastectomy, right axillary sentinel lymph node biopsy x2 with immediate reconstruction of right breast with expander.;  Surgeon: Dyan Kinsey MD;  Location: [facility];  Service:    • PELVIC LAPAROSCOPY      BSO   • REDUCTION MAMMAPLASTY         ONCOLOGIC HISTORY:  (History from previous dates can be found in the separate document.)    Current Outpatient Medications on File Prior to Visit   Medication Sig Dispense Refill   • alendronate (FOSAMAX) 70 MG tablet Take 1 tablet by mouth Every 7 (Seven) Days. 4 tablet 5   • anastrozole (ARIMIDEX) 1 MG tablet TAKE 1 TABLET BY MOUTH DAILY 90 tablet 1   • Calcium Carbonate-Vitamin D (CALCIUM PLUS VITAMIN D PO) Take 1,200 mg by mouth 2 (Two) Times a Day.     • cefdinir (OMNICEF) 300 MG capsule cefdinir 300 mg capsule     • clobetasol (TEMOVATE) 0.05 % ointment Apply to affected area as needed for itching. Do not exceed 7 days 30 g 0   • fluticasone-salmeterol (ADVAIR) 100-50 MCG/DOSE DISKUS Inhale 1 puff As Needed (SOB OR WHEEZING).     • gabapentin (NEURONTIN) 400 MG capsule gabapentin 400 mg capsule     • Influenza Vac Subunit Quad (FLUCELVAX QUADRIVALENT) 0.5 ML suspension prefilled syringe injection Flucelvax Quad 6159-1241 (PF) 60 mcg (15 mcg x 4)/0.5 mL IM syringe     • Multiple Vitamins-Minerals (MULTIVITAMIN ADULT PO) Take  by mouth.     • neomycin-colistin-hydrocortisone-thonzonium (CORTISPORIN-TC) 3.3-3-10-0.5 MG/ML otic suspension Cortisporin-TC 3.3 mg-3 mg-10 mg-0.5 mg/mL ear drops,suspension   Instill 4-5 drops in left ear tid.     • rosuvastatin (CRESTOR) 5 MG tablet Take 5 mg by mouth Every Night.     • venlafaxine XR (EFFEXOR-XR) 37.5 MG 24 hr capsule venlafaxine ER 37.5 mg capsule,extended release 24 hr   1 cap po qd     • venlafaxine XR (EFFEXOR-XR) 75 MG 24 hr capsule take 1 capsule by mouth once daily 30 capsule 5   • vitamin D (ERGOCALCIFEROL) 12848  units capsule capsule take 1 capsule by mouth every week 8 capsule 0   • HYDROcodone-acetaminophen (NORCO) 7.5-325 MG per tablet Norco 7.5 mg-325 mg tablet   take one tab po once or twice daily prn for pain       No current facility-administered medications on file prior to visit.        ALLERGIES:     Allergies   Allergen Reactions   • Sesame Seed [Sesame Oil] Anaphylaxis     Sunflower seed and the food family of it     • Tramadol Unknown - Low Severity     Social History     Socioeconomic History   • Marital status:      Spouse name: Not on file   • Number of children: 1   • Years of education: College   • Highest education level: Not on file   Occupational History   • Occupation:      Employer: [employer]   Tobacco Use   • Smoking status: Former Smoker     Packs/day: 0.50     Years: 10.00     Pack years: 5.00     Types: Cigarettes     Last attempt to quit:      Years since quittin.6   • Smokeless tobacco: Never Used   Substance and Sexual Activity   • Alcohol use: Yes     Comment: Occasional   • Drug use: No   • Sexual activity: Not Currently     Birth control/protection: Surgical, Post-menopausal     Comment: s/p BSO     Family History   Problem Relation Age of Onset   • Breast cancer Mother 70   • Hearing loss Mother    • Hyperlipidemia Mother    • Colon cancer Mother 77   • Diabetes Father    • Asthma Sister    • Clotting disorder Sister    • COPD Sister    • Pulmonary embolism Sister    • Deep vein thrombosis Brother    • Pulmonary embolism Brother    • Protein S deficiency Brother    • Colon cancer Maternal Grandfather 56   • Malig Hyperthermia Neg Hx    • Ovarian cancer Neg Hx         Review of Systems   Constitutional: Negative for activity change, appetite change, fatigue, fever and unexpected weight change.   HENT: Negative for congestion, mouth sores, nosebleeds, sore throat and voice change.    Respiratory: Negative for cough, shortness of breath and wheezing.     Cardiovascular: Negative for chest pain, palpitations and leg swelling.   Gastrointestinal: Negative for abdominal distention, abdominal pain, blood in stool, constipation, diarrhea, nausea and vomiting.   Endocrine: Positive for heat intolerance. Negative for cold intolerance.   Genitourinary: Negative for difficulty urinating, dysuria, frequency, hematuria and pelvic pain.   Musculoskeletal: Positive for arthralgias. Negative for back pain, joint swelling and myalgias.   Skin: Negative for rash and wound.   Neurological: Negative for dizziness, syncope, weakness, light-headedness, numbness and headaches.   Hematological: Negative for adenopathy. Does not bruise/bleed easily.   Psychiatric/Behavioral: Negative for confusion and sleep disturbance. The patient is not nervous/anxious.          Objective      Vitals:    08/19/20 1018   BP: 120/61   Pulse: 79   Resp: 16   Temp: 98.2 °F (36.8 °C)   SpO2: 98%      Current Status 8/19/2020   ECOG score 0   Pain 0/10    Physical Exam   Constitutional: She is oriented to person, place, and time. She appears well-developed and well-nourished.   HENT:   Mouth/Throat: Oropharynx is clear and moist.   Eyes: Conjunctivae are normal.   Neck: No thyromegaly present.   Cardiovascular: Normal rate and regular rhythm. Exam reveals no gallop and no friction rub.   No murmur heard.  Pulmonary/Chest: Breath sounds normal. No respiratory distress.   Right mastectomy with implant reconstruction.  No masses no abnormalities identified.  On the left, status post reduction surgery with no masses no abnormalities palpated.   Abdominal: Soft. Bowel sounds are normal. She exhibits no distension. There is no tenderness.   Musculoskeletal: She exhibits no edema.   There is significant swelling noted in the left wrist with limited mobility.   Lymphadenopathy:        Head (right side): No submandibular adenopathy present.     She has no cervical adenopathy.     She has no axillary adenopathy.         Right: No inguinal and no supraclavicular adenopathy present.        Left: No inguinal and no supraclavicular adenopathy present.   Neurological: She is alert and oriented to person, place, and time. She displays normal reflexes. No cranial nerve deficit. She exhibits normal muscle tone.   Skin: Skin is warm and dry. No rash noted.   Psychiatric: She has a normal mood and affect. Her behavior is normal.   The patient was examined today, unchanged from above.    RECENT LABS:  Hematology WBC   Date Value Ref Range Status   08/19/2020 7.05 3.40 - 10.80 10*3/mm3 Final     RBC   Date Value Ref Range Status   08/19/2020 4.69 3.77 - 5.28 10*6/mm3 Final     Hemoglobin   Date Value Ref Range Status   08/19/2020 13.4 12.0 - 15.9 g/dL Final     Hematocrit   Date Value Ref Range Status   08/19/2020 42.1 34.0 - 46.6 % Final     Platelets   Date Value Ref Range Status   08/19/2020 251 140 - 450 10*3/mm3 Final        Lab Results   Component Value Date    GLUCOSE 135 (H) 08/19/2020    BUN 19 08/19/2020    CREATININE 0.70 08/19/2020    EGFRIFNONA 88 08/19/2020    BCR 27.1 08/19/2020    K 4.2 08/19/2020    CO2 26.4 08/19/2020    CALCIUM 9.4 08/19/2020    ALBUMIN 4.00 08/19/2020    AST 16 08/19/2020    ALT 12 08/19/2020     Mammogram reviewed from 6/30/2020 as outlined above and below.    Assessment/Plan      1. Pathologic stage IA ([id number]) right breast cancer:   · The patient had a mammographically detected nonpalpable abnormality of the right breast measuring 1.3 cm by ultrasound in the 10 o'clock position.   · Biopsy 7/3/17 showed an invasive lobular carcinoma largest size measuring 9 mm, grade 1, ER positive (95%), WI positive (95%), HER-2/tanya 0 (IHC).  There were foci of associated LCIS.  There was no lymphatic nor perineural invasion.    · Oncotype DX testing was sent revealing a recurrence score in a low risk range at 12 (10 year recurrence risk of 8% with treatment with tamoxifen, 1% benefit from adjuvant chemotherapy) and  confirmed ER positive, IL positive, HER-2/tanya negative.    · MRI of the breasts 7/13/17 showed the biopsy-proven right breast malignancy measuring 1.9 cm with no axillary lymph node enlargement.  There was however in the middle third of the right breast a cluster of microcalcifications that appear to have increased over time.    · An MRI guided biopsy of this area was performed 7/25/17 in the 9:30 position revealing an invasive lobular carcinoma, 4 mm, grade 2, ER positive (greater than 90%), IL positive (90%), HER-2/tanya negative (1+  IHC) with associated LCIS.    · The patient was seen in the genetics clinic and underwent Gene DX panel testing 7/19/17 which was negative.  She has a family history of malignancy with breast cancer in her mother at age 70.    · The patient saw Dr. Kinsey who discussed surgical management of her disease.  With evidence of multifocal involvement of the breast, it was recommended to undergo a right mastectomy with sentinel lymph node biopsy.  She did see Dr. Waterhouse as well in regards to reconstruction.  · The patient was seen in initial consultation on 8/2/17. Oncotype DX score was in the low risk category at 12 indicating a 10 year recurrence risk of distant metastasis of 8% with tamoxifen alone.  We discussed that adjuvant chemotherapy would provide only approximately a 1% additional benefit.  It was not recommended for her to receive adjuvant chemotherapy.    · Right mastectomy with implant reconstruction and sentinel lymph node biopsy performed on 8/23/17.  Pathology showed a 1.8 cm grade 2 invasive lobular carcinoma with negative margins (greater than 1 cm), extensive associated LCIS and non-atypical ductal hyperplasia, sentinel lymph node negative ×2 with evidence of multiple noncaseating granulomas.    · Therefore, the patient had Stage IA ([id number]) disease.  Again, it was not recommended for her to receive adjuvant chemotherapy given her low risk Oncotype score.  This  would provide only an additional 1% benefit.    · We discussed pursuing adjuvant endocrine therapy.  As she was premenopausal, this would typically be undertaken with tamoxifen.  There was concern however given the patient's significant family history of thrombosis occurring at a young age with no identifiable/traceable risk factor.  Tamoxifen would confer a significant additional risk for her although she has not experienced a personal history of thrombosis.  Also, we discussed that there is suggestion from subgroup analysis of the BIG 1-98 study to suggest a significant additional benefit of aromatase inhibitor therapy over tamoxifen for invasive lobular cancer.    · Therefore, the patient underwent bilateral salpingo-oophorectomy 9/26/17 (pathology benign) producing surgical postmenopausal state.   · Baseline DEXA scan showed mild osteopenia 9/21/17 with a T score in the left femoral neck -1.2.    · On 10/10/17, initiated Arimidex 1 mg daily and Effexor XR 37.5 mg daily.  · Right expander removal and implant placement as well as left breast reduction performed 12/15/17.  · Significant hot flashes prompted increase in Effexor dose to 75 mg daily 2/2/18.  · DEXA scan 2/19/2020 showed decline in bone density into the moderate osteopenic range with T score -2.1 left hip.  On 3/4/2020 initiated Fosamax 70 mg weekly.  Plan 1 year interval follow-up DEXA scan.  · Patient returns today continuing on adjuvant anastrozole 1 mg daily as well as Effexor XR 75 mg daily.  Hot flashes are well controlled currently.  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  This is most likely related to anastrozole.  I have asked her to hold anastrozole for now.  We will check additional labs today with ESR, CRP, rheumatoid factor, LARON panel.  I have prescribed a Medrol Dosepak today.  She will return in 2  weeks for nurse practitioner visit and reassessment of the status of her arthritis.  We will discuss at that point possible treatment with Aromasin 25 mg daily.  I will plan to see her back in 6 weeks to assess her status and tolerance of Aromasin.  We did review her mammogram from 6/30/2020 which was negative, BI-RADS 1 on the left side.  Exam today was negative.  2. Aromatase inhibitor musculoskeletal syndrome:  · Significant left wrist inflammatory arthritis identified on 8/19/2020, approximately 1 month duration, interfering with mobility and functional ability.  · Anastrozole held beginning 8/19/2020, administration of Medrol Dosepak.  Labs pending with ESR, CRP, LARON panel, rheumatoid factor.  Pending status of left wrist arthritis, anticipate transition to Aromasin 25 mg daily.  3. Family history of thrombosis:   · The patient has no personal history of thrombosis.    · She has a significant family history of thrombosis however with a brother who developed a pulmonary embolism in his 30s, a brother developed a DVT at age 44, and a sister with a pulmonary embolism at age 55, all unprovoked.    · The patient herself did undergo a hypercoagulable evaluation performed by her primary care physician at [facility] 9/24/15 which showed negative prothrombin gene mutation analysis, negative factor V Leiden gene mutation analysis, negative lupus anticoagulant, negative anticardiolipin antibodies, normal homocystine, protein C activity 168%, protein S free 107%.   · We did perform additional hypercoagulable labs from 8/3/17 showing protein S free antigen 109%, protein S total antigen 125%, protein S activity 83%, antithrombin III activity 96%, beta-2 GP 1 antibody panel negative.    · Therefore, the patient has no detectable underlying hypercoagulable state.  None of her family members however had a detectable risk factor either.  This does produce some uncertainty in regards to her risk with tamoxifen.     · Therefore was elected to proceed with aromatase inhibitor therapy instead as she became surgically postmenopausal.    · She does continue on aspirin 81 mg a day.    4. Osteopenia:   · The patient had a baseline DEXA scan from 9/21/17 showing mild osteopenia, maximal T score -1.2 and left femoral neck.    · Patient returns today in follow-up with DEXA scan to review from 2/19/2020 showing a decline in her bone density with maximal T score now -2.1 involving the left hip.  We discussed the need to pursue treatment at this point and I have recommended that she begin Fosamax 70 mg weekly.  We discussed issues regarding osteonecrosis of the jaw.  She has not experienced any recent dental issues.  She is aware that the risk of osteonecrosis is enhanced with any invasive procedures that manipulate bone in the mouth.  The patient is also aware of potential risks regarding esophagitis from Fosamax and we discussed instructions surrounding administration on a weekly basis.  We will plan a repeat DEXA scan in 1 year.  5. Vitamin D deficiency:   · 25 hydroxy vitamin D level on 10/10/17 was very low at 17.8.  The patient was asked to increase vitamin D supplementation to 2400 units daily.    · Vitamin D level was still low at 26.9 on 2/2/18.  High dose vitamin D prescribed 50,000 units weekly ×8 weeks and then returned to her previous dose 2400 units daily.    · Follow-up 25-hydroxy vitamin D level on 5/2/18 was 46.4.    · Level on 3/4/2020 declined to 30.1 which, borderline low.  Increased vitamin D supplementation to 3000 units daily.  · Today, 25 hydroxy vitamin D level remains borderline at 31.2 although is in normal range.  We will asked patient to increase to 5000 units daily.  6. Postmenopausal hot flashes:   · The patient was experiencing these postoperatively from her bilateral salpingo-oophorectomy.   · With initiation of adjuvant aromatase inhibitor therapy on 10/10/17, it was elected to also currently begin  Effexor XR 37.5 mg daily.   · Unfortunately this did not adequately control her symptoms and Effexor dose was increased to 75 mg daily 2/2/18.    · The patient reports stable/tolerable hot flashes.  7. Health maintenance:   · The patient underwent a screening colonoscopy on 11/7/17 at age 52.  2 polyps were removed, benign adenomas.    · The patient's mother was subsequently diagnosed with colon cancer in 2018 at age 77.    · We did review her previous genetic test profile which did include mismatch repair genes and was negative.    · It was requested by Dr. Tom that she undergo a one year follow-up colonoscopy which was due in November 2018.  · The patient has not yet undergone follow-up colonoscopy.  We are making referral to GI today.     Plan:    1. Hold treatment with anastrozole today due to significant left wrist arthritis  2. Additional labs today with ESR, CRP, rheumatoid factor, LARON panel  3. Prescription sent for Medrol Dosepak  4. Continue Effexor XR 75 mg daily  5. Continue aspirin 81 mg daily  6. Continue vitamin D, increase dose to 5000 units daily.   7. Continue Fosamax 70 mg weekly.  8. Refer to GI for follow-up colonoscopy  9. In 2 weeks nurse practitioner visit.  Depending on status of left wrist arthritis we will consider initiating further adjuvant endocrine therapy with Aromasin 25 mg daily.  10. MD visit in 6 weeks with CBC, CMP.

## 2020-08-21 ENCOUNTER — TELEPHONE (OUTPATIENT)
Dept: ONCOLOGY | Facility: CLINIC | Age: 55
End: 2020-08-21

## 2020-08-21 RX ORDER — ERGOCALCIFEROL (VITAMIN D2) 50 MCG
5000 CAPSULE ORAL DAILY
Qty: 90 CAPSULE | Refills: 0 | Status: SHIPPED | OUTPATIENT
Start: 2020-08-21 | End: 2020-09-02

## 2020-08-21 NOTE — TELEPHONE ENCOUNTER
Per Dr Gallegos message from yesterday increase Vitamin D to 5000 units daily. She verbalized understanding and asked me to e scribe.

## 2020-08-24 ENCOUNTER — TELEPHONE (OUTPATIENT)
Dept: ONCOLOGY | Facility: CLINIC | Age: 55
End: 2020-08-24

## 2020-08-24 RX ORDER — MELOXICAM 15 MG/1
15 TABLET ORAL DAILY
Qty: 14 TABLET | Refills: 0 | Status: SHIPPED | OUTPATIENT
Start: 2020-08-24 | End: 2020-09-17

## 2020-08-24 NOTE — TELEPHONE ENCOUNTER
Spoke with pt who said the swelling in her left hand is slightly improved but she has pain through her wrist and fingers. 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 She still has 2 more days of her steroid. Reviewed with Dr Gallegos who said to add Mobic 15 mg daily for 2 weeks until pt's NP visit. Mobic e-scribed. Pt made aware and v/u.

## 2020-08-24 NOTE — TELEPHONE ENCOUNTER
Dr. Gallegos gave her a steroid last week for her wrist and hand.  It helped but still painful hasn't been able to work she is a .  Can she get something for pain so she can work.

## 2020-08-27 ENCOUNTER — TELEPHONE (OUTPATIENT)
Dept: ONCOLOGY | Facility: CLINIC | Age: 55
End: 2020-08-27

## 2020-09-02 ENCOUNTER — LAB (OUTPATIENT)
Dept: LAB | Facility: HOSPITAL | Age: 55
End: 2020-09-02

## 2020-09-02 ENCOUNTER — OFFICE VISIT (OUTPATIENT)
Dept: ONCOLOGY | Facility: CLINIC | Age: 55
End: 2020-09-02

## 2020-09-02 ENCOUNTER — APPOINTMENT (OUTPATIENT)
Dept: LAB | Facility: HOSPITAL | Age: 55
End: 2020-09-02

## 2020-09-02 VITALS
HEIGHT: 61 IN | SYSTOLIC BLOOD PRESSURE: 125 MMHG | HEART RATE: 97 BPM | DIASTOLIC BLOOD PRESSURE: 63 MMHG | OXYGEN SATURATION: 98 % | RESPIRATION RATE: 16 BRPM | BODY MASS INDEX: 31.98 KG/M2 | TEMPERATURE: 98.2 F | WEIGHT: 169.4 LBS

## 2020-09-02 DIAGNOSIS — E55.9 VITAMIN D DEFICIENCY DISEASE: ICD-10-CM

## 2020-09-02 DIAGNOSIS — M25.532 ARTHRALGIA OF LEFT WRIST: ICD-10-CM

## 2020-09-02 DIAGNOSIS — Z17.0 MALIGNANT NEOPLASM OF UPPER-OUTER QUADRANT OF RIGHT BREAST IN FEMALE, ESTROGEN RECEPTOR POSITIVE (HCC): Primary | ICD-10-CM

## 2020-09-02 DIAGNOSIS — C50.411 MALIGNANT NEOPLASM OF UPPER-OUTER QUADRANT OF RIGHT BREAST IN FEMALE, ESTROGEN RECEPTOR POSITIVE (HCC): Primary | ICD-10-CM

## 2020-09-02 PROCEDURE — 99214 OFFICE O/P EST MOD 30 MIN: CPT | Performed by: NURSE PRACTITIONER

## 2020-09-02 RX ORDER — AMOXICILLIN AND CLAVULANATE POTASSIUM 875; 125 MG/1; MG/1
TABLET, FILM COATED ORAL
COMMUNITY
Start: 2020-08-28 | End: 2020-09-17

## 2020-09-02 RX ORDER — HYDROCODONE BITARTRATE AND ACETAMINOPHEN 7.5; 325 MG/1; MG/1
TABLET ORAL
COMMUNITY
Start: 2020-08-28 | End: 2021-06-03

## 2020-09-02 RX ORDER — GABAPENTIN 300 MG/1
CAPSULE ORAL
COMMUNITY
Start: 2020-08-28

## 2020-09-02 NOTE — PROGRESS NOTES
Subjective .     REASONS FOR FOLLOWUP:    1. Stage IA ([id number]) right breast cancer: 1.8 cm grade 2 invasive lobular carcinoma, ER positive (95%, WI positive (95%), HER-2/tanya negative (IHC 0), sentinel lymph node negative ×2.  Status post right mastectomy 8/23/17.  Oncotype DX recurrence score 12 (low risk).  2. Genetics evaluation 7/19/17 with negative Gene DX panel test.  3. Strong family history of thrombosis with no detectable familial hypercoagulable factor.  No personal history of thrombosis.  Hypercoagulable evaluation negative 9/24/15, additional negative evaluation 8/3/17.  Continues chronically on aspirin 81 mg daily.  4. Status post bilateral salpingo-oophorectomy 9/26/17 achieving surgical postmenopausal state.  5. Osteopenia with Baseline DEXA scan 9/21/17 showing maximal T score -1.2 left femoral neck.  Progressive osteopenia on DEXA scan 2/19/2020 (maximal T score -2.1 left hip).  Initiated Fosamax 70 mg weekly on 3/4/2020.  Plan 1 year follow-up DEXA scan.  6. Vitamin D deficiency, treated with high-dose vitamin D 50,000 units weekly ×8 weeks with subsequent transition back to 2400 units daily 5/3/18.  Borderline vitamin D level 3/4/2020 at 30.1, dose increased to 3000 units daily.  7. Initiation of adjuvant Arimidex 1 mg daily 10/10/17.  8. Postmenopausal hot flashes, initiated Effexor XR 37.5 mg daily 10/10/17.  Dose increased to 75 mg daily on 2/2/18.    HISTORY OF PRESENT ILLNESS:  The patient is a 54 y.o. year old female who is here for follow-up with the above-mentioned history.    History of Present Illness    The patient returns the office today for follow-up regarding arthralgias secondary to aromatase inhibitor.  She was seen last by Dr. Gallegos 8/19/2020 at which time she was having significant pain in her left breast with swelling, redness and limited mobility.  She was prescribed a Medrol Dosepak at that time.  The patient reports today she lost this prescription after filling it at the  pharmacist, therefore, did not take Medrol Dosepak as prescribed.  She did call the office 8/24/2020 with reports of continued pain.  It was recommended she utilize Mobic.  It was not clearly conveyed to the nurse at that time that she did not actually take the Medrol Dosepak as prescribed by Dr. Gallegos.    The patient then followed up with her primary care provider because the pain continued.  She was prescribed Neurontin, Norco and a Medrol Dosepak through her primary care physician.  She reports today she has 2 days remaining of the Medrol Dosepak prescribed by her primary care provider.  She has had improvement in the left wrist swelling and pain since taking the Medrol Dosepak, though she does continue to have limited range of motion.  She is requesting limited work as she is a  is not able to work efficiently with her new wrist pain.  An x-ray was ordered through her family doctor, she questions the necessity of this today.    She denies fevers or chills, signs or symptoms of infection.  We have reviewed labs obtained 8/19/2020 including sed rate, CRP and LARON which were all negative.      Past Medical History:   Diagnosis Date   • Anxiety    • Asthma    • Breast cancer (CMS/HCC) 08/2017    Right   • Cancer (CMS/HCC) 2017    Right   • GERD (gastroesophageal reflux disease) 6/23/2017   • Herpes     + HSv no outbreaks   • Lichen sclerosus    • Lichen sclerosus et atrophicus 6/23/2017   • Migraine    • Osteopenia    • Polyp of colon, adenomatous      Past Surgical History:   Procedure Laterality Date   • BILATERAL BREAST REDUCTION Left 12/15/2017    Procedure: LEFT BREAST REDUCTION ;  Surgeon: Maurine Waterhouse, MD;  Location: [facility] OR;  Service:    • BREAST BIOPSY Right 2017   • BREAST RECONSTRUCTION Right 8/23/2017    Procedure: RT TISSUE EXPANDER W/ ALLODERM;  Surgeon: Maurine Waterhouse, MD;  Location: [facility];  Service:    • BREAST TISSUE EXPANDER REMOVAL INSERTION OF IMPLANT Right  12/15/2017    Procedure: RIGHT BREAST TISSUE EXPANDER REMOVAL INSERTION OF IMPLANT;  Surgeon: Maurine Waterhouse, MD;  Location: [facility] OR;  Service:    •  SECTION     • COLONOSCOPY N/A 2017    Procedure: COLONOSCOPY, polypectomy;  Surgeon: Lalita Dubose MD;  Location: [facility] OR;  Service:    • D&C HYSTEROSCOPY N/A 2017    Procedure: DILATATION AND CURETTAGE HYSTEROSCOPY;  Surgeon: Dee Morales MD;  Location: [facility] OR;  Service:    • DIAGNOSTIC LAPAROSCOPY Bilateral 2017    Procedure: DIAGNOSTIC LAPAROSCOPY,laproscopic bilateral salpingoophorectomy ;  Surgeon: Dee Morales MD;  Location: [facility] OR;  Service:    • DILATATION AND CURETTAGE     • HAND SURGERY Right    • MASTECTOMY Right 2017    Procedure: right total mastectomy, right axillary sentinel lymph node biopsy x2 with immediate reconstruction of right breast with expander.;  Surgeon: Dyan Kinsey MD;  Location: [facility];  Service:    • PELVIC LAPAROSCOPY      BSO   • REDUCTION MAMMAPLASTY         ONCOLOGIC HISTORY:  (History from previous dates can be found in the separate document.)    Current Outpatient Medications on File Prior to Visit   Medication Sig Dispense Refill   • alendronate (FOSAMAX) 70 MG tablet Take 1 tablet by mouth Every 7 (Seven) Days. 4 tablet 5   • amoxicillin-clavulanate (AUGMENTIN) 875-125 MG per tablet TK 1 T PO Q 12 H FOR 7 DAYS     • anastrozole (ARIMIDEX) 1 MG tablet TAKE 1 TABLET BY MOUTH DAILY 90 tablet 1   • Calcium Carbonate-Vitamin D (CALCIUM PLUS VITAMIN D PO) Take 1,200 mg by mouth 2 (Two) Times a Day.     • cefdinir (OMNICEF) 300 MG capsule cefdinir 300 mg capsule     • Cholecalciferol (VITAMIN D3) 125 MCG (5000 UT) capsule capsule Take 1 capsule by mouth Daily. Needs new prescription.   Got thrown away on accident and medication is gone     • clobetasol (TEMOVATE) 0.05 % ointment Apply to affected area as needed for itching. Do not exceed 7 days 30 g  0   • fluticasone-salmeterol (ADVAIR) 100-50 MCG/DOSE DISKUS Inhale 1 puff As Needed (SOB OR WHEEZING).     • gabapentin (NEURONTIN) 300 MG capsule TK 1 C PO QD HS     • gabapentin (NEURONTIN) 400 MG capsule gabapentin 400 mg capsule     • HYDROcodone-acetaminophen (NORCO) 7.5-325 MG per tablet TK 1 T PO TID  PRN     • Influenza Vac Subunit Quad (FLUCELVAX QUADRIVALENT) 0.5 ML suspension prefilled syringe injection Flucelvax Quad 2505-7466 (PF) 60 mcg (15 mcg x 4)/0.5 mL IM syringe     • meloxicam (Mobic) 15 MG tablet Take 1 tablet by mouth Daily. 14 tablet 0   • methylPREDNISolone (MEDROL) 4 MG dose pack Take as directed on package instructions. 1 each 0   • Multiple Vitamins-Minerals (MULTIVITAMIN ADULT PO) Take  by mouth.     • neomycin-colistin-hydrocortisone-thonzonium (CORTISPORIN-TC) 3.3-3-10-0.5 MG/ML otic suspension Cortisporin-TC 3.3 mg-3 mg-10 mg-0.5 mg/mL ear drops,suspension   Instill 4-5 drops in left ear tid.     • rosuvastatin (CRESTOR) 5 MG tablet Take 5 mg by mouth Every Night.     • venlafaxine XR (EFFEXOR-XR) 37.5 MG 24 hr capsule venlafaxine ER 37.5 mg capsule,extended release 24 hr   1 cap po qd     • venlafaxine XR (EFFEXOR-XR) 75 MG 24 hr capsule take 1 capsule by mouth once daily 30 capsule 5   • vitamin D (ERGOCALCIFEROL) 15442 units capsule capsule take 1 capsule by mouth every week 8 capsule 0   • Vitamin D, Ergocalciferol, 50 MCG (2000 UT) capsule Take 5,000 Units by mouth Daily. 90 capsule 0     No current facility-administered medications on file prior to visit.        ALLERGIES:     Allergies   Allergen Reactions   • Sesame Seed [Sesame Oil] Anaphylaxis     Sunflower seed and the food family of it     • Tramadol Unknown - Low Severity     Social History     Socioeconomic History   • Marital status:      Spouse name: Not on file   • Number of children: 1   • Years of education: College   • Highest education level: Not on file   Occupational History   • Occupation:       Employer: [employer]   Tobacco Use   • Smoking status: Former Smoker     Packs/day: 0.50     Years: 10.00     Pack years: 5.00     Types: Cigarettes     Last attempt to quit: 2007     Years since quittin.6   • Smokeless tobacco: Never Used   Substance and Sexual Activity   • Alcohol use: Yes     Comment: Occasional   • Drug use: No   • Sexual activity: Not Currently     Birth control/protection: Surgical, Post-menopausal     Comment: s/p BSO     Family History   Problem Relation Age of Onset   • Breast cancer Mother 70   • Hearing loss Mother    • Hyperlipidemia Mother    • Colon cancer Mother 77   • Diabetes Father    • Asthma Sister    • Clotting disorder Sister    • COPD Sister    • Pulmonary embolism Sister    • Deep vein thrombosis Brother    • Pulmonary embolism Brother    • Protein S deficiency Brother    • Colon cancer Maternal Grandfather 56   • Malig Hyperthermia Neg Hx    • Ovarian cancer Neg Hx      I have reviewed the patient's medical history in detail and updated the computerized patient record.      Review of Systems   Constitutional: Negative for activity change, appetite change, fatigue, fever and unexpected weight change.   HENT: Negative for congestion, mouth sores, nosebleeds, sore throat and voice change.    Respiratory: Negative for cough, shortness of breath and wheezing.    Cardiovascular: Negative for chest pain, palpitations and leg swelling.   Gastrointestinal: Negative for abdominal distention, abdominal pain, blood in stool, constipation, diarrhea, nausea and vomiting.   Endocrine: Positive for heat intolerance. Negative for cold intolerance.   Genitourinary: Negative for difficulty urinating, dysuria, frequency, hematuria and pelvic pain.   Musculoskeletal: Positive for arthralgias (left wrist). Negative for back pain, joint swelling and myalgias.   Skin: Negative for rash and wound.   Neurological: Negative for dizziness, syncope, weakness, light-headedness, numbness  and headaches.   Hematological: Negative for adenopathy. Does not bruise/bleed easily.   Psychiatric/Behavioral: Negative for confusion and sleep disturbance. The patient is not nervous/anxious.    Review of systems 09/02/2020  unchanged from previous office visit except as updated.        Objective      Vitals:    09/02/20 1557   BP: 125/63   Pulse: 97   Resp: 16   Temp: 98.2 °F (36.8 °C)   SpO2: 98%      Current Status 9/2/2020   ECOG score 0   Pain 0/10    Physical Exam   Constitutional: She is oriented to person, place, and time. She appears well-developed and well-nourished.   HENT:   Mouth/Throat: Oropharynx is clear and moist.   Eyes: Conjunctivae are normal.   Cardiovascular: Normal rate and regular rhythm. Exam reveals no gallop and no friction rub.   No murmur heard.  Pulmonary/Chest: Breath sounds normal. No respiratory distress.   Breast not examined today, 9/2/2020   Musculoskeletal: She exhibits no edema.   There continues to be limited mobility of the left wrist, though swelling and erythema has now resolved.   Neurological: She is alert and oriented to person, place, and time. She displays normal reflexes. No cranial nerve deficit. She exhibits normal muscle tone.   Skin: Skin is warm and dry. No rash noted.   Psychiatric: She has a normal mood and affect. Her behavior is normal.   Physical exam 09/02/2020 unchanged from previous office visit except as updated.      RECENT LABS:  Hematology WBC   Date Value Ref Range Status   08/19/2020 7.05 3.40 - 10.80 10*3/mm3 Final     RBC   Date Value Ref Range Status   08/19/2020 4.69 3.77 - 5.28 10*6/mm3 Final     Hemoglobin   Date Value Ref Range Status   08/19/2020 13.4 12.0 - 15.9 g/dL Final     Hematocrit   Date Value Ref Range Status   08/19/2020 42.1 34.0 - 46.6 % Final     Platelets   Date Value Ref Range Status   08/19/2020 251 140 - 450 10*3/mm3 Final        Lab Results   Component Value Date    GLUCOSE 135 (H) 08/19/2020    BUN 19 08/19/2020     CREATININE 0.70 08/19/2020    EGFRIFNONA 88 08/19/2020    BCR 27.1 08/19/2020    K 4.2 08/19/2020    CO2 26.4 08/19/2020    CALCIUM 9.4 08/19/2020    ALBUMIN 4.00 08/19/2020    AST 16 08/19/2020    ALT 12 08/19/2020     Patient declined repeat labs today, 9/2/2020    Assessment/Plan      1. Pathologic stage IA ([id number]) right breast cancer:   · The patient had a mammographically detected nonpalpable abnormality of the right breast measuring 1.3 cm by ultrasound in the 10 o'clock position.   · Biopsy 7/3/17 showed an invasive lobular carcinoma largest size measuring 9 mm, grade 1, ER positive (95%), AK positive (95%), HER-2/tanya 0 (IHC).  There were foci of associated LCIS.  There was no lymphatic nor perineural invasion.    · Oncotype DX testing was sent revealing a recurrence score in a low risk range at 12 (10 year recurrence risk of 8% with treatment with tamoxifen, 1% benefit from adjuvant chemotherapy) and confirmed ER positive, AK positive, HER-2/tanya negative.    · MRI of the breasts 7/13/17 showed the biopsy-proven right breast malignancy measuring 1.9 cm with no axillary lymph node enlargement.  There was however in the middle third of the right breast a cluster of microcalcifications that appear to have increased over time.    · An MRI guided biopsy of this area was performed 7/25/17 in the 9:30 position revealing an invasive lobular carcinoma, 4 mm, grade 2, ER positive (greater than 90%), AK positive (90%), HER-2/tanya negative (1+  IHC) with associated LCIS.    · The patient was seen in the genetics clinic and underwent Gene DX panel testing 7/19/17 which was negative.  She has a family history of malignancy with breast cancer in her mother at age 70.    · The patient saw Dr. Kinsey who discussed surgical management of her disease.  With evidence of multifocal involvement of the breast, it was recommended to undergo a right mastectomy with sentinel lymph node biopsy.  She did see Dr. Waterhouse as well  in regards to reconstruction.  · The patient was seen in initial consultation on 8/2/17. Oncotype DX score was in the low risk category at 12 indicating a 10 year recurrence risk of distant metastasis of 8% with tamoxifen alone.  We discussed that adjuvant chemotherapy would provide only approximately a 1% additional benefit.  It was not recommended for her to receive adjuvant chemotherapy.    · Right mastectomy with implant reconstruction and sentinel lymph node biopsy performed on 8/23/17.  Pathology showed a 1.8 cm grade 2 invasive lobular carcinoma with negative margins (greater than 1 cm), extensive associated LCIS and non-atypical ductal hyperplasia, sentinel lymph node negative ×2 with evidence of multiple noncaseating granulomas.    · Therefore, the patient had Stage IA ([id number]) disease.  Again, it was not recommended for her to receive adjuvant chemotherapy given her low risk Oncotype score.  This would provide only an additional 1% benefit.    · We discussed pursuing adjuvant endocrine therapy.  As she was premenopausal, this would typically be undertaken with tamoxifen.  There was concern however given the patient's significant family history of thrombosis occurring at a young age with no identifiable/traceable risk factor.  Tamoxifen would confer a significant additional risk for her although she has not experienced a personal history of thrombosis.  Also, we discussed that there is suggestion from subgroup analysis of the BIG 1-98 study to suggest a significant additional benefit of aromatase inhibitor therapy over tamoxifen for invasive lobular cancer.    · Therefore, the patient underwent bilateral salpingo-oophorectomy 9/26/17 (pathology benign) producing surgical postmenopausal state.   · Baseline DEXA scan showed mild osteopenia 9/21/17 with a T score in the left femoral neck -1.2.    · On 10/10/17, initiated Arimidex 1 mg daily and Effexor XR 37.5 mg daily.  · Right expander removal and implant  placement as well as left breast reduction performed 12/15/17.  · Significant hot flashes prompted increase in Effexor dose to 75 mg daily 2/2/18.  · DEXA scan 2/19/2020 showed decline in bone density into the moderate osteopenic range with T score -2.1 left hip.  On 3/4/2020 initiated Fosamax 70 mg weekly.  Plan 1 year interval follow-up DEXA scan.  · When seen 8/19/2020, she had developed significant arthritis in her left wrist with evidence of obvious swelling and limited mobility which is interfering with her ability to type for her job.  This is most likely related to anastrozole.  Anastrozole was held.  Additional labs including ESR, CRP, rheumatoid factor and LARON panel obtained which were negative.  The patient was prescribed a Medrol Dosepak, though reports losing this prescription.  She then followed up with her primary care provider who prescribed Norco, gabapentin and an additional Medrol Dosepak.  She has 2 days remaining.  She has noted improvement in the swelling and erythema, though continues to have limited range of motion in the left wrist.  Aromatase inhibitor will remain on hold.  She will follow-up in 2 weeks to discuss potentially initiation of Aromasin 25 mg daily.  She is scheduled to follow-up with Dr. Gallegos in 1 month  2. Aromatase inhibitor musculoskeletal syndrome:  · Significant left wrist inflammatory arthritis identified on 8/19/2020, approximately 1 month duration, interfering with mobility and functional ability.  · Anastrozole held beginning 8/19/2020, administration of Medrol Dosepak.  ESR, CRP, LARON panel and rheumatoid factor negative.  · Erythema and swelling improved, though continued limited range of motion.  AI will remain on hold for an additional 2 weeks.  · In 2 weeks, we can consider initiation of Aromasin.  3. Family history of thrombosis:   · The patient has no personal history of thrombosis.    · She has a significant family history of thrombosis however with a brother  who developed a pulmonary embolism in his 30s, a brother developed a DVT at age 44, and a sister with a pulmonary embolism at age 55, all unprovoked.    · The patient herself did undergo a hypercoagulable evaluation performed by her primary care physician at [facility] 9/24/15 which showed negative prothrombin gene mutation analysis, negative factor V Leiden gene mutation analysis, negative lupus anticoagulant, negative anticardiolipin antibodies, normal homocystine, protein C activity 168%, protein S free 107%.   · We did perform additional hypercoagulable labs from 8/3/17 showing protein S free antigen 109%, protein S total antigen 125%, protein S activity 83%, antithrombin III activity 96%, beta-2 GP 1 antibody panel negative.    · Therefore, the patient has no detectable underlying hypercoagulable state.  None of her family members however had a detectable risk factor either.  This does produce some uncertainty in regards to her risk with tamoxifen.    · Therefore was elected to proceed with aromatase inhibitor therapy instead as she became surgically postmenopausal.    · She does continue on aspirin 81 mg a day.    4. Osteopenia:   · The patient had a baseline DEXA scan from 9/21/17 showing mild osteopenia, maximal T score -1.2 and left femoral neck.    · Patient returns today in follow-up with DEXA scan to review from 2/19/2020 showing a decline in her bone density with maximal T score now -2.1 involving the left hip.  We discussed the need to pursue treatment at this point and I have recommended that she begin Fosamax 70 mg weekly.  We discussed issues regarding osteonecrosis of the jaw.  She has not experienced any recent dental issues.  She is aware that the risk of osteonecrosis is enhanced with any invasive procedures that manipulate bone in the mouth.  The patient is also aware of potential risks regarding esophagitis from Fosamax and we discussed instructions surrounding administration on a  weekly basis.  We will plan a repeat DEXA scan in 1 year.  5. Vitamin D deficiency:   · 25 hydroxy vitamin D level on 10/10/17 was very low at 17.8.  The patient was asked to increase vitamin D supplementation to 2400 units daily.    · Vitamin D level was still low at 26.9 on 2/2/18.  High dose vitamin D prescribed 50,000 units weekly ×8 weeks and then returned to her previous dose 2400 units daily.    · Follow-up 25-hydroxy vitamin D level on 5/2/18 was 46.4.    · Level on 3/4/2020 declined to 30.1 which, borderline low.  Increased vitamin D supplementation to 3000 units daily.  · 8/19/2020, 25 hydroxy vitamin D level remains borderline at 31.2 although is in normal range.  Vitamin D increased to 5000 units daily.  · The patient is requesting a new prescription of vitamin D today, 9/2/2020.  She reports she lost the old prescription.  6. Postmenopausal hot flashes:   · The patient was experiencing these postoperatively from her bilateral salpingo-oophorectomy.   · With initiation of adjuvant aromatase inhibitor therapy on 10/10/17, it was elected to also currently begin Effexor XR 37.5 mg daily.   · Unfortunately this did not adequately control her symptoms and Effexor dose was increased to 75 mg daily 2/2/18.    · The patient reports stable/tolerable hot flashes.  Her hot flashes have improved over the past 2 weeks while holding aromatase inhibitor  7. Health maintenance:   · The patient underwent a screening colonoscopy on 11/7/17 at age 52.  2 polyps were removed, benign adenomas.    · The patient's mother was subsequently diagnosed with colon cancer in 2018 at age 77.    · We did review her previous genetic test profile which did include mismatch repair genes and was negative.    · It was requested by Dr. Tom that she undergo a one year follow-up colonoscopy which was due in November 2018.  · The patient was referred to GI 8/19/2020, she has not yet heard from them to schedule screening colonoscopy.   Scheduling will contact GI today.     Plan:    1. Complete Medrol Dosepak.  (The patient lost the original prescription from Dr. Gallegos? and is now taking a prescription prescribed through PCP and has 2 days remaining)  2. Aromatase inhibitor will remain on hold  3. The patient was prescribed Norco and gabapentin through her primary care provider.  We will not be maintaining these prescriptions.  4. Continue Effexor XR 75 mg daily  5. Continue aspirin 81 mg daily  6. Continue vitamin D 5000 units daily  7. Continue Fosamax 70 mg weekly.  8. Scheduling to contact GI regarding referral and follow-up for colonoscopy   9. Return in 2 weeks for nurse practitioner follow-up and evaluation of left wrist.  At that time, we can consider initiation of Aromasin 25 mg daily   10. MD follow-up with Dr. Gallegos in 4 weeks with CBC, CMP, evaluation tolerance to Aromasin.    Today's plan of care was discussed and reviewed with Dr. Gallegos who is in agreement to continue to hold aromatase inhibitor.  The patient requested a work restriction note due to continued left wrist pain, this was provided to the patient.  A total of 30 minutes were spent with the patient, 25 minutes spent in face-to-face counseling and education reviewing plan of care and medication toxicity.    Virginie Mccormick, APRN  09/02/2020

## 2020-09-03 RX ORDER — CLOBETASOL PROPIONATE 0.5 MG/G
OINTMENT TOPICAL
Qty: 30 G | Refills: 0 | Status: SHIPPED | OUTPATIENT
Start: 2020-09-03 | End: 2020-12-02

## 2020-09-17 ENCOUNTER — OFFICE VISIT (OUTPATIENT)
Dept: ONCOLOGY | Facility: CLINIC | Age: 55
End: 2020-09-17

## 2020-09-17 ENCOUNTER — LAB (OUTPATIENT)
Dept: LAB | Facility: HOSPITAL | Age: 55
End: 2020-09-17

## 2020-09-17 VITALS
SYSTOLIC BLOOD PRESSURE: 132 MMHG | TEMPERATURE: 98.6 F | WEIGHT: 174.3 LBS | HEIGHT: 61 IN | RESPIRATION RATE: 18 BRPM | HEART RATE: 79 BPM | DIASTOLIC BLOOD PRESSURE: 68 MMHG | BODY MASS INDEX: 32.91 KG/M2 | OXYGEN SATURATION: 97 %

## 2020-09-17 DIAGNOSIS — Z17.0 MALIGNANT NEOPLASM OF UPPER-OUTER QUADRANT OF RIGHT BREAST IN FEMALE, ESTROGEN RECEPTOR POSITIVE (HCC): Primary | ICD-10-CM

## 2020-09-17 DIAGNOSIS — M25.532 ARTHRALGIA OF LEFT WRIST: ICD-10-CM

## 2020-09-17 DIAGNOSIS — C50.411 MALIGNANT NEOPLASM OF UPPER-OUTER QUADRANT OF RIGHT BREAST IN FEMALE, ESTROGEN RECEPTOR POSITIVE (HCC): ICD-10-CM

## 2020-09-17 DIAGNOSIS — C50.411 MALIGNANT NEOPLASM OF UPPER-OUTER QUADRANT OF RIGHT BREAST IN FEMALE, ESTROGEN RECEPTOR POSITIVE (HCC): Primary | ICD-10-CM

## 2020-09-17 DIAGNOSIS — Z17.0 MALIGNANT NEOPLASM OF UPPER-OUTER QUADRANT OF RIGHT BREAST IN FEMALE, ESTROGEN RECEPTOR POSITIVE (HCC): ICD-10-CM

## 2020-09-17 LAB
ALBUMIN SERPL-MCNC: 4.2 G/DL (ref 3.5–5.2)
ALBUMIN/GLOB SERPL: 1.5 G/DL (ref 1.1–2.4)
ALP SERPL-CCNC: 81 U/L (ref 38–116)
ALT SERPL W P-5'-P-CCNC: 26 U/L (ref 0–33)
ANION GAP SERPL CALCULATED.3IONS-SCNC: 8.3 MMOL/L (ref 5–15)
AST SERPL-CCNC: 27 U/L (ref 0–32)
BASOPHILS # BLD AUTO: 0.06 10*3/MM3 (ref 0–0.2)
BASOPHILS NFR BLD AUTO: 1 % (ref 0–1.5)
BILIRUB SERPL-MCNC: 0.3 MG/DL (ref 0.2–1.2)
BUN SERPL-MCNC: 16 MG/DL (ref 6–20)
BUN/CREAT SERPL: 24.2 (ref 7.3–30)
CALCIUM SPEC-SCNC: 9.1 MG/DL (ref 8.5–10.2)
CHLORIDE SERPL-SCNC: 105 MMOL/L (ref 98–107)
CO2 SERPL-SCNC: 25.7 MMOL/L (ref 22–29)
CREAT SERPL-MCNC: 0.66 MG/DL (ref 0.6–1.1)
DEPRECATED RDW RBC AUTO: 45.4 FL (ref 37–54)
EOSINOPHIL # BLD AUTO: 0.36 10*3/MM3 (ref 0–0.4)
EOSINOPHIL NFR BLD AUTO: 5.9 % (ref 0.3–6.2)
ERYTHROCYTE [DISTWIDTH] IN BLOOD BY AUTOMATED COUNT: 13.8 % (ref 12.3–15.4)
GFR SERPL CREATININE-BSD FRML MDRD: 94 ML/MIN/1.73
GLOBULIN UR ELPH-MCNC: 2.8 GM/DL (ref 1.8–3.5)
GLUCOSE SERPL-MCNC: 138 MG/DL (ref 74–124)
HCT VFR BLD AUTO: 41.1 % (ref 34–46.6)
HGB BLD-MCNC: 13.3 G/DL (ref 12–15.9)
IMM GRANULOCYTES # BLD AUTO: 0.03 10*3/MM3 (ref 0–0.05)
IMM GRANULOCYTES NFR BLD AUTO: 0.5 % (ref 0–0.5)
LYMPHOCYTES # BLD AUTO: 1.86 10*3/MM3 (ref 0.7–3.1)
LYMPHOCYTES NFR BLD AUTO: 30.6 % (ref 19.6–45.3)
MCH RBC QN AUTO: 29.2 PG (ref 26.6–33)
MCHC RBC AUTO-ENTMCNC: 32.4 G/DL (ref 31.5–35.7)
MCV RBC AUTO: 90.3 FL (ref 79–97)
MONOCYTES # BLD AUTO: 0.47 10*3/MM3 (ref 0.1–0.9)
MONOCYTES NFR BLD AUTO: 7.7 % (ref 5–12)
NEUTROPHILS NFR BLD AUTO: 3.3 10*3/MM3 (ref 1.7–7)
NEUTROPHILS NFR BLD AUTO: 54.3 % (ref 42.7–76)
NRBC BLD AUTO-RTO: 0 /100 WBC (ref 0–0.2)
PLATELET # BLD AUTO: 229 10*3/MM3 (ref 140–450)
PMV BLD AUTO: 9.4 FL (ref 6–12)
POTASSIUM SERPL-SCNC: 4.3 MMOL/L (ref 3.5–4.7)
PROT SERPL-MCNC: 7 G/DL (ref 6.3–8)
RBC # BLD AUTO: 4.55 10*6/MM3 (ref 3.77–5.28)
SODIUM SERPL-SCNC: 139 MMOL/L (ref 134–145)
WBC # BLD AUTO: 6.08 10*3/MM3 (ref 3.4–10.8)

## 2020-09-17 PROCEDURE — 99213 OFFICE O/P EST LOW 20 MIN: CPT | Performed by: NURSE PRACTITIONER

## 2020-09-17 PROCEDURE — 85025 COMPLETE CBC W/AUTO DIFF WBC: CPT

## 2020-09-17 PROCEDURE — 80053 COMPREHEN METABOLIC PANEL: CPT

## 2020-09-17 PROCEDURE — 36415 COLL VENOUS BLD VENIPUNCTURE: CPT

## 2020-09-17 NOTE — PROGRESS NOTES
Subjective .     REASONS FOR FOLLOWUP:    1. Stage IA ([id number]) right breast cancer: 1.8 cm grade 2 invasive lobular carcinoma, ER positive (95%, NC positive (95%), HER-2/tanya negative (IHC 0), sentinel lymph node negative ×2.  Status post right mastectomy 8/23/17.  Oncotype DX recurrence score 12 (low risk).  2. Genetics evaluation 7/19/17 with negative Gene DX panel test.  3. Strong family history of thrombosis with no detectable familial hypercoagulable factor.  No personal history of thrombosis.  Hypercoagulable evaluation negative 9/24/15, additional negative evaluation 8/3/17.  Continues chronically on aspirin 81 mg daily.  4. Status post bilateral salpingo-oophorectomy 9/26/17 achieving surgical postmenopausal state.  5. Osteopenia with Baseline DEXA scan 9/21/17 showing maximal T score -1.2 left femoral neck.  Progressive osteopenia on DEXA scan 2/19/2020 (maximal T score -2.1 left hip).  Initiated Fosamax 70 mg weekly on 3/4/2020.  Plan 1 year follow-up DEXA scan.  6. Vitamin D deficiency, treated with high-dose vitamin D 50,000 units weekly ×8 weeks with subsequent transition back to 2400 units daily 5/3/18.  Borderline vitamin D level 3/4/2020 at 30.1, dose increased to 3000 units daily.  7. Initiation of adjuvant Arimidex 1 mg daily 10/10/17.  8. Postmenopausal hot flashes, initiated Effexor XR 37.5 mg daily 10/10/17.  Dose increased to 75 mg daily on 2/2/18.    HISTORY OF PRESENT ILLNESS:  The patient is a 54 y.o. year old female who is here for follow-up with the above-mentioned history.    History of Present Illness   Returns today in follow-up regarding left wrist arthralgias thought to be secondary to aromatase inhibitor.  She was seen last by Dr. Gallegos 8/19/2020 at which time she was having significant pain in her left wrist with swelling, redness and limited mobility.  She was prescribed a Medrol Dosepak at that time.  The patient reports today she lost this prescription after filling it at the  pharmacist, therefore, did not take Medrol Dosepak as prescribed.  She did call the office 2020 with reports of continued pain.  It was recommended she utilize Mobic.  It was not clearly conveyed to the nurse at that time that she did not actually take the Medrol Dosepak as prescribed by Dr. Gallegos.    The patient then followed up with her primary care provider because the pain continued.  She was prescribed Neurontin, Norco and a Medrol Dosepak through her primary care physician.      She denies fevers or chills, signs or symptoms of infection.  Previous labs obtained 2020 including sed rate, CRP and LARON which were all negative.      Past Medical History:   Diagnosis Date   • Anxiety    • Asthma    • Breast cancer (CMS/HCC) 2017    Right   • Cancer (CMS/HCC) 2017    Right   • GERD (gastroesophageal reflux disease) 2017   • Herpes     + HSv no outbreaks   • Lichen sclerosus    • Lichen sclerosus et atrophicus 2017   • Migraine    • Osteopenia    • Polyp of colon, adenomatous      Past Surgical History:   Procedure Laterality Date   • BILATERAL BREAST REDUCTION Left 12/15/2017    Procedure: LEFT BREAST REDUCTION ;  Surgeon: Maurine Waterhouse, MD;  Location: [facility] OR;  Service:    • BREAST BIOPSY Right 2017   • BREAST RECONSTRUCTION Right 2017    Procedure: RT TISSUE EXPANDER W/ ALLODERM;  Surgeon: Maurine Waterhouse, MD;  Location: [facility] OR;  Service:    • BREAST TISSUE EXPANDER REMOVAL INSERTION OF IMPLANT Right 12/15/2017    Procedure: RIGHT BREAST TISSUE EXPANDER REMOVAL INSERTION OF IMPLANT;  Surgeon: Maurine Waterhouse, MD;  Location: [facility] OR;  Service:    •  SECTION     • COLONOSCOPY N/A 2017    Procedure: COLONOSCOPY, polypectomy;  Surgeon: Lalita Dubose MD;  Location: [facility] OR;  Service:    • D&C HYSTEROSCOPY N/A 2017    Procedure: DILATATION AND CURETTAGE HYSTEROSCOPY;  Surgeon: Dee Morales MD;  Location: [facility] OR;  Service:     • DIAGNOSTIC LAPAROSCOPY Bilateral 9/26/2017    Procedure: DIAGNOSTIC LAPAROSCOPY,laproscopic bilateral salpingoophorectomy ;  Surgeon: Dee Morales MD;  Location: [facility] OR;  Service:    • DILATATION AND CURETTAGE     • HAND SURGERY Right    • MASTECTOMY Right 8/23/2017    Procedure: right total mastectomy, right axillary sentinel lymph node biopsy x2 with immediate reconstruction of right breast with expander.;  Surgeon: Dyan Kinsey MD;  Location: [facility] OR;  Service:    • PELVIC LAPAROSCOPY      BSO   • REDUCTION MAMMAPLASTY         ONCOLOGIC HISTORY:  (History from previous dates can be found in the separate document.)    Current Outpatient Medications on File Prior to Visit   Medication Sig Dispense Refill   • Calcium Carbonate-Vitamin D (CALCIUM PLUS VITAMIN D PO) Take 1,200 mg by mouth 2 (Two) Times a Day.     • Cholecalciferol (VITAMIN D3) 125 MCG (5000 UT) capsule capsule Take 1 capsule by mouth Daily. Needs new prescription.   Got thrown away on accident and medication is gone 30 capsule 2   • clobetasol (TEMOVATE) 0.05 % ointment APPLY TO THE AFFECTED AREA DAILY AS NEEDED FOR ITCHING--DO NOT USE FOR MORE THAN 7 DAYS IN A ROW 30 g 0   • fluticasone-salmeterol (ADVAIR) 100-50 MCG/DOSE DISKUS Inhale 1 puff As Needed (SOB OR WHEEZING).     • gabapentin (NEURONTIN) 300 MG capsule TK 1 C PO QD HS     • HYDROcodone-acetaminophen (NORCO) 7.5-325 MG per tablet TK 1 T PO TID  PRN     • Influenza Vac Subunit Quad (FLUCELVAX QUADRIVALENT) 0.5 ML suspension prefilled syringe injection Flucelvax Quad [id number] (PF) 60 mcg (15 mcg x 4)/0.5 mL IM syringe     • Multiple Vitamins-Minerals (MULTIVITAMIN ADULT PO) Take  by mouth.     • rosuvastatin (CRESTOR) 5 MG tablet Take 5 mg by mouth Every Night.     • venlafaxine XR (EFFEXOR-XR) 75 MG 24 hr capsule take 1 capsule by mouth once daily 30 capsule 5   • [DISCONTINUED] alendronate (FOSAMAX) 70 MG tablet Take 1 tablet by mouth Every 7 (Seven) Days.  4 tablet 5   • [DISCONTINUED] amoxicillin-clavulanate (AUGMENTIN) 875-125 MG per tablet TK 1 T PO Q 12 H FOR 7 DAYS     • [DISCONTINUED] anastrozole (ARIMIDEX) 1 MG tablet TAKE 1 TABLET BY MOUTH DAILY 90 tablet 1   • [DISCONTINUED] cefdinir (OMNICEF) 300 MG capsule cefdinir 300 mg capsule     • [DISCONTINUED] gabapentin (NEURONTIN) 400 MG capsule gabapentin 400 mg capsule     • [DISCONTINUED] meloxicam (Mobic) 15 MG tablet Take 1 tablet by mouth Daily. 14 tablet 0   • [DISCONTINUED] methylPREDNISolone (MEDROL) 4 MG dose pack Take as directed on package instructions. 1 each 0   • [DISCONTINUED] neomycin-colistin-hydrocortisone-thonzonium (CORTISPORIN-TC) 3.3-3-10-0.5 MG/ML otic suspension Cortisporin-TC 3.3 mg-3 mg-10 mg-0.5 mg/mL ear drops,suspension   Instill 4-5 drops in left ear tid.     • [DISCONTINUED] venlafaxine XR (EFFEXOR-XR) 37.5 MG 24 hr capsule venlafaxine ER 37.5 mg capsule,extended release 24 hr   1 cap po qd       No current facility-administered medications on file prior to visit.        ALLERGIES:     Allergies   Allergen Reactions   • Sesame Seed [Sesame Oil] Anaphylaxis     Sunflower seed and the food family of it     • Tramadol Unknown - Low Severity     Social History     Socioeconomic History   • Marital status:      Spouse name: Not on file   • Number of children: 1   • Years of education: College   • Highest education level: Not on file   Occupational History   • Occupation:      Employer: [employer]   Tobacco Use   • Smoking status: Former Smoker     Packs/day: 0.50     Years: 10.00     Pack years: 5.00     Types: Cigarettes     Quit date:      Years since quittin.7   • Smokeless tobacco: Never Used   Substance and Sexual Activity   • Alcohol use: Yes     Comment: Occasional   • Drug use: No   • Sexual activity: Not Currently     Birth control/protection: Surgical, Post-menopausal     Comment: s/p BSO     Family History   Problem Relation Age of Onset   •  Breast cancer Mother 70   • Hearing loss Mother    • Hyperlipidemia Mother    • Colon cancer Mother 77   • Diabetes Father    • Asthma Sister    • Clotting disorder Sister    • COPD Sister    • Pulmonary embolism Sister    • Deep vein thrombosis Brother    • Pulmonary embolism Brother    • Protein S deficiency Brother    • Colon cancer Maternal Grandfather 56   • Malig Hyperthermia Neg Hx    • Ovarian cancer Neg Hx      I have reviewed the patient's medical history in detail and updated the computerized patient record.      Review of Systems   Constitutional: Negative for activity change, appetite change, fatigue, fever and unexpected weight change.   HENT: Negative for congestion, mouth sores, nosebleeds, sore throat and voice change.    Respiratory: Negative for cough, shortness of breath and wheezing.    Cardiovascular: Negative for chest pain, palpitations and leg swelling.   Gastrointestinal: Negative for abdominal distention, abdominal pain, blood in stool, constipation, diarrhea, nausea and vomiting.   Endocrine: Positive for heat intolerance. Negative for cold intolerance.   Genitourinary: Negative for difficulty urinating, dysuria, frequency, hematuria and pelvic pain.   Musculoskeletal: Positive for arthralgias (left wrist). Negative for back pain, joint swelling and myalgias.   Skin: Negative for rash and wound.   Neurological: Negative for dizziness, syncope, weakness, light-headedness, numbness and headaches.   Hematological: Negative for adenopathy. Does not bruise/bleed easily.   Psychiatric/Behavioral: Negative for confusion and sleep disturbance. The patient is not nervous/anxious.    Review of systems 09/17/2020  unchanged from previous office visit except as updated.        Objective      Vitals:    09/17/20 1350   BP: 132/68   Pulse: 79   Resp: 18   Temp: 98.6 °F (37 °C)   SpO2: 97%      Current Status 9/17/2020   ECOG score 0     Pain Score    09/17/20 1350   PainSc: 0-No pain         Physical  Exam   Constitutional: She is oriented to person, place, and time.   HENT:   Head: Normocephalic and atraumatic.   Eyes: Pupils are equal, round, and reactive to light. No scleral icterus.   Neck: Normal range of motion. Neck supple.   Musculoskeletal:      Comments: There continues to be limited mobility of the left wrist, swelling is present with L>R   Neurological: She is alert and oriented to person, place, and time.   Skin: Skin is warm and dry. No erythema.   Psychiatric: Her behavior is normal. Thought content normal.   Vitals reviewed.        RECENT LABS:  Hematology WBC   Date Value Ref Range Status   09/17/2020 6.08 3.40 - 10.80 10*3/mm3 Final     RBC   Date Value Ref Range Status   09/17/2020 4.55 3.77 - 5.28 10*6/mm3 Final     Hemoglobin   Date Value Ref Range Status   09/17/2020 13.3 12.0 - 15.9 g/dL Final     Hematocrit   Date Value Ref Range Status   09/17/2020 41.1 34.0 - 46.6 % Final     Platelets   Date Value Ref Range Status   09/17/2020 229 140 - 450 10*3/mm3 Final        Lab Results   Component Value Date    GLUCOSE 138 (H) 09/17/2020    BUN 16 09/17/2020    CREATININE 0.66 09/17/2020    EGFRIFNONA 94 09/17/2020    BCR 24.2 09/17/2020    K 4.3 09/17/2020    CO2 25.7 09/17/2020    CALCIUM 9.1 09/17/2020    ALBUMIN 4.20 09/17/2020    AST 27 09/17/2020    ALT 26 09/17/2020         Assessment/Plan      1. Pathologic stage IA ([id number]) right breast cancer:   · The patient had a mammographically detected nonpalpable abnormality of the right breast measuring 1.3 cm by ultrasound in the 10 o'clock position.   · Biopsy 7/3/17 showed an invasive lobular carcinoma largest size measuring 9 mm, grade 1, ER positive (95%), IN positive (95%), HER-2/tanya 0 (IHC).  There were foci of associated LCIS.  There was no lymphatic nor perineural invasion.    · Oncotype DX testing was sent revealing a recurrence score in a low risk range at 12 (10 year recurrence risk of 8% with treatment with tamoxifen, 1% benefit from  adjuvant chemotherapy) and confirmed ER positive, VA positive, HER-2/tanya negative.    · MRI of the breasts 7/13/17 showed the biopsy-proven right breast malignancy measuring 1.9 cm with no axillary lymph node enlargement.  There was however in the middle third of the right breast a cluster of microcalcifications that appear to have increased over time.    · An MRI guided biopsy of this area was performed 7/25/17 in the 9:30 position revealing an invasive lobular carcinoma, 4 mm, grade 2, ER positive (greater than 90%), VA positive (90%), HER-2/tanya negative (1+  IHC) with associated LCIS.    · The patient was seen in the genetics clinic and underwent Gene DX panel testing 7/19/17 which was negative.  She has a family history of malignancy with breast cancer in her mother at age 70.    · The patient saw Dr. Kinsey who discussed surgical management of her disease.  With evidence of multifocal involvement of the breast, it was recommended to undergo a right mastectomy with sentinel lymph node biopsy.  She did see Dr. Waterhouse as well in regards to reconstruction.  · The patient was seen in initial consultation on 8/2/17. Oncotype DX score was in the low risk category at 12 indicating a 10 year recurrence risk of distant metastasis of 8% with tamoxifen alone.  We discussed that adjuvant chemotherapy would provide only approximately a 1% additional benefit.  It was not recommended for her to receive adjuvant chemotherapy.    · Right mastectomy with implant reconstruction and sentinel lymph node biopsy performed on 8/23/17.  Pathology showed a 1.8 cm grade 2 invasive lobular carcinoma with negative margins (greater than 1 cm), extensive associated LCIS and non-atypical ductal hyperplasia, sentinel lymph node negative ×2 with evidence of multiple noncaseating granulomas.    · Therefore, the patient had Stage IA ([id number]) disease.  Again, it was not recommended for her to receive adjuvant chemotherapy given her low risk  Oncotype score.  This would provide only an additional 1% benefit.    · We discussed pursuing adjuvant endocrine therapy.  As she was premenopausal, this would typically be undertaken with tamoxifen.  There was concern however given the patient's significant family history of thrombosis occurring at a young age with no identifiable/traceable risk factor.  Tamoxifen would confer a significant additional risk for her although she has not experienced a personal history of thrombosis.  Also, we discussed that there is suggestion from subgroup analysis of the BIG 1-98 study to suggest a significant additional benefit of aromatase inhibitor therapy over tamoxifen for invasive lobular cancer.    · Therefore, the patient underwent bilateral salpingo-oophorectomy 9/26/17 (pathology benign) producing surgical postmenopausal state.   · Baseline DEXA scan showed mild osteopenia 9/21/17 with a T score in the left femoral neck -1.2.    · On 10/10/17, initiated Arimidex 1 mg daily and Effexor XR 37.5 mg daily.  · Right expander removal and implant placement as well as left breast reduction performed 12/15/17.  · Significant hot flashes prompted increase in Effexor dose to 75 mg daily 2/2/18.  · DEXA scan 2/19/2020 showed decline in bone density into the moderate osteopenic range with T score -2.1 left hip.  On 3/4/2020 initiated Fosamax 70 mg weekly.  Plan 1 year interval follow-up DEXA scan.  · When seen 8/19/2020, she had developed significant arthritis in her left wrist with evidence of obvious swelling and limited mobility which is interfering with her ability to type for her job.  This is most likely related to anastrozole.  Anastrozole was held.  Additional labs including ESR, CRP, rheumatoid factor and LARON panel obtained which were negative.  The patient was prescribed a Medrol Dosepak, though reports losing this prescription.  She then followed up with her primary care provider who prescribed Norco, gabapentin and an  additional Medrol Dosepak. She has noted improvement in the swelling and erythema, though continues to have limited range of motion in the left wrist.  Aromatase inhibitor will remain on hold.    · Patient is reviewed again 9/17/2020 with a limited range of motion and discomfort in the left wrist.  She states that the Medrol Dosepak did help but is not normalized.  We will extend her time of limited work for the next month as we further manage this issue.  I will discuss the case with Dr. Gallegos upon his return early next week the patient will be called.  As his is not improved we will not initiate Aromasin 25 mg at this visit but can consider in the future.  She is scheduled to follow-up with Dr. Gallegos in  2 weeks.  2. Aromatase inhibitor musculoskeletal syndrome:  · Significant left wrist inflammatory arthritis identified on 8/19/2020, approximately 1 month duration, interfering with mobility and functional ability.  · Anastrozole held beginning 8/19/2020, administration of Medrol Dosepak.  ESR, CRP, LARON panel and rheumatoid factor negative.  · Erythema and swelling improved, though continued limited range of motion.  Her swelling is better than it was it presentation of this issue however has not yet equal with her right wrist.  She still does have limited range of motion and discomfort.  This will be discussed with Dr. Gallegos early next week we will call the patient regarding the plan.  3. Family history of thrombosis:   · The patient has no personal history of thrombosis.    · She has a significant family history of thrombosis however with a brother who developed a pulmonary embolism in his 30s, a brother developed a DVT at age 44, and a sister with a pulmonary embolism at age 55, all unprovoked.    · The patient herself did undergo a hypercoagulable evaluation performed by her primary care physician at [facility] 9/24/15 which showed negative prothrombin gene mutation analysis, negative factor V  Leiden gene mutation analysis, negative lupus anticoagulant, negative anticardiolipin antibodies, normal homocystine, protein C activity 168%, protein S free 107%.   · We did perform additional hypercoagulable labs from 8/3/17 showing protein S free antigen 109%, protein S total antigen 125%, protein S activity 83%, antithrombin III activity 96%, beta-2 GP 1 antibody panel negative.    · Therefore, the patient has no detectable underlying hypercoagulable state.  None of her family members however had a detectable risk factor either.  This does produce some uncertainty in regards to her risk with tamoxifen.    · Therefore was elected to proceed with aromatase inhibitor therapy instead as she became surgically postmenopausal.    · She does continue on aspirin 81 mg a day.    4. Osteopenia:   · The patient had a baseline DEXA scan from 9/21/17 showing mild osteopenia, maximal T score -1.2 and left femoral neck.    · Patient returns today in follow-up with DEXA scan to review from 2/19/2020 showing a decline in her bone density with maximal T score now -2.1 involving the left hip.  We discussed the need to pursue treatment at this point and I have recommended that she begin Fosamax 70 mg weekly.  We discussed issues regarding osteonecrosis of the jaw.  She has not experienced any recent dental issues.  She is aware that the risk of osteonecrosis is enhanced with any invasive procedures that manipulate bone in the mouth.  The patient is also aware of potential risks regarding esophagitis from Fosamax and we discussed instructions surrounding administration on a weekly basis.  We will plan a repeat DEXA scan in 1 year.  5. Vitamin D deficiency:   · 25 hydroxy vitamin D level on 10/10/17 was very low at 17.8.  The patient was asked to increase vitamin D supplementation to 2400 units daily.    · Vitamin D level was still low at 26.9 on 2/2/18.  High dose vitamin D prescribed 50,000 units weekly ×8 weeks and then returned to  her previous dose 2400 units daily.    · Follow-up 25-hydroxy vitamin D level on 5/2/18 was 46.4.    · Level on 3/4/2020 declined to 30.1 which, borderline low.  Increased vitamin D supplementation to 3000 units daily.  · 8/19/2020, 25 hydroxy vitamin D level remains borderline at 31.2 although is in normal range.  Vitamin D increased to 5000 units daily.  6. Postmenopausal hot flashes:   · The patient was experiencing these postoperatively from her bilateral salpingo-oophorectomy.   · With initiation of adjuvant aromatase inhibitor therapy on 10/10/17, it was elected to also currently begin Effexor XR 37.5 mg daily.   · Unfortunately this did not adequately control her symptoms and Effexor dose was increased to 75 mg daily 2/2/18.    · The patient reports stable/tolerable hot flashes.  Her hot flashes have improved over the past 4 weeks while holding aromatase inhibitor  7. Health maintenance:   · The patient underwent a screening colonoscopy on 11/7/17 at age 52.  2 polyps were removed, benign adenomas.    · The patient's mother was subsequently diagnosed with colon cancer in 2018 at age 77.    · We did review her previous genetic test profile which did include mismatch repair genes and was negative.    · It was requested by Dr. Tom that she undergo a one year follow-up colonoscopy which was due in November 2018.  · The patient was referred to GI 8/19/2020, she has not yet heard from them to schedule screening colonoscopy.  Follow-up on this at the next visit.     Plan:    1. Continue to hold aromatase inhibitor.  2. Will discuss with Dr. Gallegos when he returns in the office next week, further plan due to patient's continued limited mobility and discomfort.  3. The patient was prescribed Norco and gabapentin through her primary care provider.  We will not be maintaining these prescriptions.  4. Continue Effexor XR 75 mg daily  5. Continue aspirin 81 mg daily  6. Continue vitamin D 5000 units daily  7. Continue  Fosamax 70 mg weekly.  8. GI regarding referral and follow-up for colonoscopy   9. In the future if her left wrist pain and swelling improves, consider initiation of Aromasin 25 mg daily   10. MD follow-up with Dr. Gallegos in 2 weeks with CBC, CMP, evaluation tolerance to Aromasin.    Addendum: Patient will be sent to acute recl[facility] for evaluation as discussed with Dr. Gallegos.  She will keep his appointment with him thereafter.    LYNN Cooley

## 2020-09-18 ENCOUNTER — TELEPHONE (OUTPATIENT)
Dept: ONCOLOGY | Facility: CLINIC | Age: 55
End: 2020-09-18

## 2020-09-18 NOTE — TELEPHONE ENCOUNTER
----- Message from LYNN Cooley sent at 9/17/2020  5:19 PM EDT -----  Regarding: referral to [facility]  I placed a referral to hand surgery for this patient.  She needs to be seen prior to her visit with Dr. Gallegos which is in 2 weeks.  I placed a referral for Dr. Edwards but it can be any provider in their practice.

## 2020-10-06 ENCOUNTER — APPOINTMENT (OUTPATIENT)
Dept: LAB | Facility: HOSPITAL | Age: 55
End: 2020-10-06

## 2020-11-04 ENCOUNTER — LAB (OUTPATIENT)
Dept: LAB | Facility: HOSPITAL | Age: 55
End: 2020-11-04

## 2020-11-04 ENCOUNTER — OFFICE VISIT (OUTPATIENT)
Dept: ONCOLOGY | Facility: CLINIC | Age: 55
End: 2020-11-04

## 2020-11-04 VITALS
HEART RATE: 83 BPM | TEMPERATURE: 97.8 F | SYSTOLIC BLOOD PRESSURE: 146 MMHG | WEIGHT: 179 LBS | BODY MASS INDEX: 33.79 KG/M2 | OXYGEN SATURATION: 95 % | DIASTOLIC BLOOD PRESSURE: 62 MMHG | HEIGHT: 61 IN | RESPIRATION RATE: 18 BRPM

## 2020-11-04 DIAGNOSIS — C50.411 MALIGNANT NEOPLASM OF UPPER-OUTER QUADRANT OF RIGHT BREAST IN FEMALE, ESTROGEN RECEPTOR POSITIVE (HCC): Primary | ICD-10-CM

## 2020-11-04 DIAGNOSIS — C50.411 MALIGNANT NEOPLASM OF UPPER-OUTER QUADRANT OF RIGHT BREAST IN FEMALE, ESTROGEN RECEPTOR POSITIVE (HCC): ICD-10-CM

## 2020-11-04 DIAGNOSIS — Z17.0 MALIGNANT NEOPLASM OF UPPER-OUTER QUADRANT OF RIGHT BREAST IN FEMALE, ESTROGEN RECEPTOR POSITIVE (HCC): Primary | ICD-10-CM

## 2020-11-04 DIAGNOSIS — M85.852 OSTEOPENIA OF LEFT THIGH: ICD-10-CM

## 2020-11-04 DIAGNOSIS — Z17.0 MALIGNANT NEOPLASM OF UPPER-OUTER QUADRANT OF RIGHT BREAST IN FEMALE, ESTROGEN RECEPTOR POSITIVE (HCC): ICD-10-CM

## 2020-11-04 LAB
ALBUMIN SERPL-MCNC: 4 G/DL (ref 3.5–5.2)
ALBUMIN/GLOB SERPL: 1.5 G/DL (ref 1.1–2.4)
ALP SERPL-CCNC: 89 U/L (ref 38–116)
ALT SERPL W P-5'-P-CCNC: 13 U/L (ref 0–33)
ANION GAP SERPL CALCULATED.3IONS-SCNC: 10 MMOL/L (ref 5–15)
AST SERPL-CCNC: 15 U/L (ref 0–32)
BASOPHILS # BLD AUTO: 0.06 10*3/MM3 (ref 0–0.2)
BASOPHILS NFR BLD AUTO: 0.9 % (ref 0–1.5)
BILIRUB SERPL-MCNC: 0.3 MG/DL (ref 0.2–1.2)
BUN SERPL-MCNC: 16 MG/DL (ref 6–20)
BUN/CREAT SERPL: 29.1 (ref 7.3–30)
CALCIUM SPEC-SCNC: 8.8 MG/DL (ref 8.5–10.2)
CHLORIDE SERPL-SCNC: 105 MMOL/L (ref 98–107)
CO2 SERPL-SCNC: 24 MMOL/L (ref 22–29)
CREAT SERPL-MCNC: 0.55 MG/DL (ref 0.6–1.1)
DEPRECATED RDW RBC AUTO: 43.2 FL (ref 37–54)
EOSINOPHIL # BLD AUTO: 0.42 10*3/MM3 (ref 0–0.4)
EOSINOPHIL NFR BLD AUTO: 6.5 % (ref 0.3–6.2)
ERYTHROCYTE [DISTWIDTH] IN BLOOD BY AUTOMATED COUNT: 13.1 % (ref 12.3–15.4)
GFR SERPL CREATININE-BSD FRML MDRD: 116 ML/MIN/1.73
GLOBULIN UR ELPH-MCNC: 2.6 GM/DL (ref 1.8–3.5)
GLUCOSE SERPL-MCNC: 127 MG/DL (ref 74–124)
HCT VFR BLD AUTO: 41.5 % (ref 34–46.6)
HGB BLD-MCNC: 13.3 G/DL (ref 12–15.9)
IMM GRANULOCYTES # BLD AUTO: 0.03 10*3/MM3 (ref 0–0.05)
IMM GRANULOCYTES NFR BLD AUTO: 0.5 % (ref 0–0.5)
LYMPHOCYTES # BLD AUTO: 1.7 10*3/MM3 (ref 0.7–3.1)
LYMPHOCYTES NFR BLD AUTO: 26.5 % (ref 19.6–45.3)
MCH RBC QN AUTO: 29 PG (ref 26.6–33)
MCHC RBC AUTO-ENTMCNC: 32 G/DL (ref 31.5–35.7)
MCV RBC AUTO: 90.6 FL (ref 79–97)
MONOCYTES # BLD AUTO: 0.52 10*3/MM3 (ref 0.1–0.9)
MONOCYTES NFR BLD AUTO: 8.1 % (ref 5–12)
NEUTROPHILS NFR BLD AUTO: 3.69 10*3/MM3 (ref 1.7–7)
NEUTROPHILS NFR BLD AUTO: 57.5 % (ref 42.7–76)
NRBC BLD AUTO-RTO: 0 /100 WBC (ref 0–0.2)
PLATELET # BLD AUTO: 265 10*3/MM3 (ref 140–450)
PMV BLD AUTO: 9 FL (ref 6–12)
POTASSIUM SERPL-SCNC: 4.1 MMOL/L (ref 3.5–4.7)
PROT SERPL-MCNC: 6.6 G/DL (ref 6.3–8)
RBC # BLD AUTO: 4.58 10*6/MM3 (ref 3.77–5.28)
SODIUM SERPL-SCNC: 139 MMOL/L (ref 134–145)
WBC # BLD AUTO: 6.42 10*3/MM3 (ref 3.4–10.8)

## 2020-11-04 PROCEDURE — 80053 COMPREHEN METABOLIC PANEL: CPT

## 2020-11-04 PROCEDURE — 99214 OFFICE O/P EST MOD 30 MIN: CPT | Performed by: INTERNAL MEDICINE

## 2020-11-04 PROCEDURE — 36415 COLL VENOUS BLD VENIPUNCTURE: CPT

## 2020-11-04 PROCEDURE — 85025 COMPLETE CBC W/AUTO DIFF WBC: CPT

## 2020-11-04 RX ORDER — EXEMESTANE 25 MG/1
25 TABLET ORAL DAILY
Qty: 30 TABLET | Refills: 5 | Status: SHIPPED | OUTPATIENT
Start: 2020-11-04 | End: 2021-09-14

## 2020-11-04 RX ORDER — MELOXICAM 15 MG/1
15 TABLET ORAL DAILY
Qty: 30 TABLET | Refills: 1 | Status: SHIPPED | OUTPATIENT
Start: 2020-11-04 | End: 2020-11-05

## 2020-11-04 RX ORDER — ALENDRONATE SODIUM 70 MG/1
70 TABLET ORAL
Qty: 4 TABLET | Refills: 3 | Status: SHIPPED | OUTPATIENT
Start: 2020-11-04 | End: 2021-05-21

## 2020-11-04 RX ORDER — METHYLPREDNISOLONE 4 MG/1
TABLET ORAL
Qty: 1 EACH | Refills: 0 | Status: SHIPPED | OUTPATIENT
Start: 2020-11-04 | End: 2020-12-02

## 2020-11-05 RX ORDER — CLOBETASOL PROPIONATE 0.5 MG/G
OINTMENT TOPICAL
Qty: 30 G | Refills: 0 | OUTPATIENT
Start: 2020-11-05

## 2020-11-05 RX ORDER — MELOXICAM 15 MG/1
15 TABLET ORAL DAILY
Qty: 90 TABLET | Refills: 0 | Status: SHIPPED | OUTPATIENT
Start: 2020-11-05

## 2020-12-02 ENCOUNTER — LAB (OUTPATIENT)
Dept: LAB | Facility: HOSPITAL | Age: 55
End: 2020-12-02

## 2020-12-02 ENCOUNTER — OFFICE VISIT (OUTPATIENT)
Dept: ONCOLOGY | Facility: CLINIC | Age: 55
End: 2020-12-02

## 2020-12-02 VITALS
SYSTOLIC BLOOD PRESSURE: 133 MMHG | WEIGHT: 177.6 LBS | RESPIRATION RATE: 16 BRPM | HEART RATE: 84 BPM | TEMPERATURE: 98.6 F | OXYGEN SATURATION: 97 % | BODY MASS INDEX: 33.57 KG/M2 | DIASTOLIC BLOOD PRESSURE: 84 MMHG

## 2020-12-02 DIAGNOSIS — Z17.0 MALIGNANT NEOPLASM OF UPPER-OUTER QUADRANT OF RIGHT BREAST IN FEMALE, ESTROGEN RECEPTOR POSITIVE (HCC): Primary | ICD-10-CM

## 2020-12-02 DIAGNOSIS — C50.411 MALIGNANT NEOPLASM OF UPPER-OUTER QUADRANT OF RIGHT BREAST IN FEMALE, ESTROGEN RECEPTOR POSITIVE (HCC): Primary | ICD-10-CM

## 2020-12-02 DIAGNOSIS — C50.411 MALIGNANT NEOPLASM OF UPPER-OUTER QUADRANT OF RIGHT BREAST IN FEMALE, ESTROGEN RECEPTOR POSITIVE (HCC): ICD-10-CM

## 2020-12-02 DIAGNOSIS — R23.2 HOT FLASHES: ICD-10-CM

## 2020-12-02 DIAGNOSIS — M85.852 OSTEOPENIA OF LEFT THIGH: ICD-10-CM

## 2020-12-02 DIAGNOSIS — Z17.0 MALIGNANT NEOPLASM OF UPPER-OUTER QUADRANT OF RIGHT BREAST IN FEMALE, ESTROGEN RECEPTOR POSITIVE (HCC): ICD-10-CM

## 2020-12-02 LAB
ALBUMIN SERPL-MCNC: 4.3 G/DL (ref 3.5–5.2)
ALBUMIN/GLOB SERPL: 1.5 G/DL (ref 1.1–2.4)
ALP SERPL-CCNC: 89 U/L (ref 38–116)
ALT SERPL W P-5'-P-CCNC: 18 U/L (ref 0–33)
ANION GAP SERPL CALCULATED.3IONS-SCNC: 9 MMOL/L (ref 5–15)
AST SERPL-CCNC: 18 U/L (ref 0–32)
BASOPHILS # BLD AUTO: 0.08 10*3/MM3 (ref 0–0.2)
BASOPHILS NFR BLD AUTO: 1.2 % (ref 0–1.5)
BILIRUB SERPL-MCNC: 0.6 MG/DL (ref 0.2–1.2)
BUN SERPL-MCNC: 15 MG/DL (ref 6–20)
BUN/CREAT SERPL: 26.3 (ref 7.3–30)
CALCIUM SPEC-SCNC: 9.7 MG/DL (ref 8.5–10.2)
CHLORIDE SERPL-SCNC: 103 MMOL/L (ref 98–107)
CO2 SERPL-SCNC: 26 MMOL/L (ref 22–29)
CREAT SERPL-MCNC: 0.57 MG/DL (ref 0.6–1.1)
DEPRECATED RDW RBC AUTO: 42.8 FL (ref 37–54)
EOSINOPHIL # BLD AUTO: 0.35 10*3/MM3 (ref 0–0.4)
EOSINOPHIL NFR BLD AUTO: 5.1 % (ref 0.3–6.2)
ERYTHROCYTE [DISTWIDTH] IN BLOOD BY AUTOMATED COUNT: 13.1 % (ref 12.3–15.4)
GFR SERPL CREATININE-BSD FRML MDRD: 111 ML/MIN/1.73
GLOBULIN UR ELPH-MCNC: 2.8 GM/DL (ref 1.8–3.5)
GLUCOSE SERPL-MCNC: 119 MG/DL (ref 74–124)
HCT VFR BLD AUTO: 44.6 % (ref 34–46.6)
HGB BLD-MCNC: 14.7 G/DL (ref 12–15.9)
IMM GRANULOCYTES # BLD AUTO: 0.03 10*3/MM3 (ref 0–0.05)
IMM GRANULOCYTES NFR BLD AUTO: 0.4 % (ref 0–0.5)
LYMPHOCYTES # BLD AUTO: 1.72 10*3/MM3 (ref 0.7–3.1)
LYMPHOCYTES NFR BLD AUTO: 24.9 % (ref 19.6–45.3)
MCH RBC QN AUTO: 29.5 PG (ref 26.6–33)
MCHC RBC AUTO-ENTMCNC: 33 G/DL (ref 31.5–35.7)
MCV RBC AUTO: 89.4 FL (ref 79–97)
MONOCYTES # BLD AUTO: 0.53 10*3/MM3 (ref 0.1–0.9)
MONOCYTES NFR BLD AUTO: 7.7 % (ref 5–12)
NEUTROPHILS NFR BLD AUTO: 4.21 10*3/MM3 (ref 1.7–7)
NEUTROPHILS NFR BLD AUTO: 60.7 % (ref 42.7–76)
NRBC BLD AUTO-RTO: 0 /100 WBC (ref 0–0.2)
PLATELET # BLD AUTO: 280 10*3/MM3 (ref 140–450)
PMV BLD AUTO: 9.3 FL (ref 6–12)
POTASSIUM SERPL-SCNC: 4.5 MMOL/L (ref 3.5–4.7)
PROT SERPL-MCNC: 7.1 G/DL (ref 6.3–8)
RBC # BLD AUTO: 4.99 10*6/MM3 (ref 3.77–5.28)
SODIUM SERPL-SCNC: 138 MMOL/L (ref 134–145)
WBC # BLD AUTO: 6.92 10*3/MM3 (ref 3.4–10.8)

## 2020-12-02 PROCEDURE — 80053 COMPREHEN METABOLIC PANEL: CPT

## 2020-12-02 PROCEDURE — 85025 COMPLETE CBC W/AUTO DIFF WBC: CPT

## 2020-12-02 PROCEDURE — 99214 OFFICE O/P EST MOD 30 MIN: CPT | Performed by: INTERNAL MEDICINE

## 2020-12-02 PROCEDURE — 36415 COLL VENOUS BLD VENIPUNCTURE: CPT

## 2020-12-07 RX ORDER — CLOBETASOL PROPIONATE 0.5 MG/G
OINTMENT TOPICAL
Qty: 60 G | Refills: 0 | Status: SHIPPED | OUTPATIENT
Start: 2020-12-07 | End: 2021-04-13

## 2021-01-22 ENCOUNTER — TELEPHONE (OUTPATIENT)
Dept: ONCOLOGY | Facility: CLINIC | Age: 56
End: 2021-01-22

## 2021-01-22 NOTE — TELEPHONE ENCOUNTER
Caller: CLARA LYNCH    Relationship to patient: SELF    Best call back number: [phone]    STATES SHE HAS NOT HEARD FROM ANYONE REGARDING GETTING HER COLONOSCOPY SCHEDULED. STATE DR FERNANDES TOLD HER TO CALL IF SHE HAD NOT HEARD FROM ANYONE

## 2021-01-25 ENCOUNTER — TELEPHONE (OUTPATIENT)
Dept: ONCOLOGY | Facility: CLINIC | Age: 56
End: 2021-01-25

## 2021-01-25 NOTE — TELEPHONE ENCOUNTER
Provider: DOM    Caller: CLARA LYNCH    Relationship to Patient: SELF    Phone Number: [phone]    Reason for Call: PT RETURNING RN PHONE CALL FROM Friday, 1-. SHE SAID SHE WAS TRYING TO GET A COLONOSCOPY SET UP THAT DR. FERNANDES WANTED HER TO HAVE. SHE STATED THAT OUR OFFICE SAID IF SHE CANNOT GET A HOLD OF SCHEDULING OR DOES NOT HEAR ANYTHING TO GIVE US A CALL. PT WAS ASKED WHAT DAY OR TIME OR WORKED FOR HER FOR PROCEDURE. SHE STATED ANY DAY IN THE AFTERNOON OR LATER.

## 2021-02-22 ENCOUNTER — HOSPITAL ENCOUNTER (OUTPATIENT)
Dept: BONE DENSITY | Facility: HOSPITAL | Age: 56
Discharge: HOME | End: 2021-02-22
Admitting: INTERNAL MEDICINE

## 2021-02-22 DIAGNOSIS — Z17.0 MALIGNANT NEOPLASM OF UPPER-OUTER QUADRANT OF RIGHT BREAST IN FEMALE, ESTROGEN RECEPTOR POSITIVE (HCC): ICD-10-CM

## 2021-02-22 DIAGNOSIS — C50.411 MALIGNANT NEOPLASM OF UPPER-OUTER QUADRANT OF RIGHT BREAST IN FEMALE, ESTROGEN RECEPTOR POSITIVE (HCC): ICD-10-CM

## 2021-02-22 DIAGNOSIS — R23.2 HOT FLASHES: ICD-10-CM

## 2021-02-22 DIAGNOSIS — M85.852 OSTEOPENIA OF LEFT THIGH: ICD-10-CM

## 2021-02-22 PROCEDURE — 77080 DXA BONE DENSITY AXIAL: CPT

## 2021-03-02 NOTE — PROGRESS NOTES
"Chief Complaint   Stage IA ([id number]) right breast cancer, ER/AL positive and HER-2/tanya negative.  Family history of thrombosis.  Osteopenia.  Vitamin D deficiency.  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    Subjective        History of Present Illness  Patient returns today in follow-up continuing on adjuvant Aromasin 25 mg daily with laboratory studies and DEXA scan to review.  Patient is also continuing on Effexor XR 75 mg daily, aspirin 81 mg daily, Fosamax 70 mg weekly, gabapentin 300 mg daily, and Mobic 15 mg daily.  Since her last visit, she has not followed up with GI for colonoscopy.  She reports that she has contacted their office number of times but cannot get through and is asking for assistance from our office to help schedule this.  Patient notes that her arthralgias are fairly stable involving her hands, wrists, knees.  She feels that her arthralgias are currently tolerable.  She does have some mild chronic fatigue.      Objective   Vital Signs:   /86   Pulse 88   Temp 97.3 °F (36.3 °C) (Temporal)   Resp 18   Ht 154.9 cm (60.98\")   Wt 81.4 kg (179 lb 8 oz)   SpO2 98%   BMI 33.93 kg/m²     Physical Exam  Constitutional:       Appearance: She is well-developed.   Eyes:      Conjunctiva/sclera: Conjunctivae normal.   Neck:      Thyroid: No thyromegaly.   Cardiovascular:      Rate and Rhythm: Normal rate and regular rhythm.      Heart sounds: No murmur. No friction rub. No gallop.    Pulmonary:      Effort: No respiratory distress.      Breath sounds: Normal breath sounds.      Comments: Status post right mastectomy with implant reconstruction, no masses or abnormalities identified.  Left breast without masses or abnormalities identified.  Abdominal:      General: Bowel sounds are normal. There is no distension.      Palpations: Abdomen is soft.      Tenderness: There is no abdominal tenderness.   Lymphadenopathy:      Head:      Right side of head: No " submandibular adenopathy.      Cervical: No cervical adenopathy.      Upper Body:      Right upper body: No supraclavicular adenopathy.      Left upper body: No supraclavicular adenopathy.   Skin:     General: Skin is warm and dry.      Findings: No rash.   Neurological:      Mental Status: She is alert and oriented to person, place, and time.      Cranial Nerves: No cranial nerve deficit.      Motor: No abnormal muscle tone.      Deep Tendon Reflexes: Reflexes normal.   Psychiatric:         Behavior: Behavior normal.        Result Review : Reviewed CBC, CMP from today.  Reviewed DEXA scan from 2/22/2021.       Assessment and Plan      1. Stage IA ([id number]) right breast cancer, ER/VA positive and HER-2/tanya negative:   · The patient had a mammographically detected nonpalpable abnormality of the right breast measuring 1.3 cm by ultrasound in the 10 o'clock position.   · Biopsy 7/3/17 showed an invasive lobular carcinoma largest size measuring 9 mm, grade 1, ER positive (95%), VA positive (95%), HER-2/tanya 0 ([facility]).  There were foci of associated LCIS.  There was no lymphatic nor perineural invasion.    · Oncotype DX testing was sent revealing a recurrence score in a low risk range at 12 (10 year recurrence risk of 8% with treatment with tamoxifen, 1% benefit from adjuvant chemotherapy) and confirmed ER positive, VA positive, HER-2/tanya negative.    · MRI of the breasts 7/13/17 showed the biopsy-proven right breast malignancy measuring 1.9 cm with no axillary lymph node enlargement.  There was however in the middle third of the right breast a cluster of microcalcifications that appear to have increased over time.    · An MRI guided biopsy of this area was performed 7/25/17 in the 9:30 position revealing an invasive lobular carcinoma, 4 mm, grade 2, ER positive (greater than 90%), VA positive (90%), HER-2/tanya negative (1+  [facility]) with associated LCIS.    · The patient was seen in the genetics clinic and underwent Gene DX  panel testing 7/19/17 which was negative.  She has a family history of malignancy with breast cancer in her mother at age 70.    · The patient saw Dr. Kinsey who discussed surgical management of her disease.  With evidence of multifocal involvement of the breast, it was recommended to undergo a right mastectomy with sentinel lymph node biopsy.  She did see Dr. Waterhouse as well in regards to reconstruction.  · The patient was seen in initial consultation on 8/2/17. Oncotype DX score was in the low risk category at 12 indicating a 10 year recurrence risk of distant metastasis of 8% with tamoxifen alone.  We discussed that adjuvant chemotherapy would provide only approximately a 1% additional benefit.  It was not recommended for her to receive adjuvant chemotherapy.    · Right mastectomy with implant reconstruction and sentinel lymph node biopsy performed on 8/23/17.  Pathology showed a 1.8 cm grade 2 invasive lobular carcinoma with negative margins (greater than 1 cm), extensive associated LCIS and non-atypical ductal hyperplasia, sentinel lymph node negative ×2 with evidence of multiple noncaseating granulomas.    · Therefore, the patient had Stage IA ([id number]) disease.  Again, it was not recommended for her to receive adjuvant chemotherapy given her low risk Oncotype score.  This would provide only an additional 1% benefit.    · We discussed pursuing adjuvant endocrine therapy.  As she was premenopausal, this would typically be undertaken with tamoxifen.  There was concern however given the patient's significant family history of thrombosis occurring at a young age with no identifiable/traceable risk factor.  Tamoxifen would confer a significant additional risk for her although she has not experienced a personal history of thrombosis.  Also, we discussed that there is suggestion from subgroup analysis of the BIG 1-98 study to suggest a significant additional benefit of aromatase inhibitor therapy over tamoxifen  for invasive lobular cancer.    · Therefore, the patient underwent bilateral salpingo-oophorectomy 9/26/17 (pathology benign) producing surgical postmenopausal state.   · Baseline DEXA scan showed mild osteopenia 9/21/17 with a T score in the left femoral neck -1.2.    · On 10/10/17, initiated Arimidex 1 mg daily and Effexor XR 37.5 mg daily.  · Right expander removal and implant placement as well as left breast reduction performed 12/15/17.  · Significant hot flashes prompted increase in Effexor dose to 75 mg daily 2/2/18.  · DEXA scan 2/19/2020 showed decline in bone density into the moderate osteopenic range with T score -2.1 left hip.  On 3/4/2020 initiated Fosamax 70 mg weekly.  Plan 1 year interval follow-up DEXA scan.  · Development of severe musculoskeletal pain in left wrist, Arimidex held beginning 8/19/2020 as potential contributing factor.  Eventually required referral to hand surgery for evaluation and received steroid injection.  · Resumed adjuvant aromatase inhibitor therapy on 11/4/2020 with Aromasin 25 mg daily.  · The patient returns today in follow-up continuing on adjuvant endocrine therapy with Aromasin 25 mg daily.  She feels that her aromatase inhibitor induced musculoskeletal pain mainly involving her hands, wrists, knees is stable and tolerable currently.  She has no clinical evidence of recurrent disease.  Her exam today is negative.  We will schedule her annual mammogram on the left side in June 2021.  I will see her back in 6 months for follow-up and she is aware to contact us in the interval with any issues.  2. Family history of thrombosis:   · The patient has no personal history of thrombosis.    · She has a significant family history of thrombosis however with a brother who developed a pulmonary embolism in his 30s, a brother developed a DVT at age 44, and a sister with a pulmonary embolism at age 55, all unprovoked.    · The patient herself did undergo a hypercoagulable evaluation  performed by her primary care physician at [facility] 9/24/15 which showed negative prothrombin gene mutation analysis, negative factor V Leiden gene mutation analysis, negative lupus anticoagulant, negative anticardiolipin antibodies, normal homocystine, protein C activity 168%, protein S free 107%.   · We did perform additional hypercoagulable labs from 8/3/17 showing protein S free antigen 109%, protein S total antigen 125%, protein S activity 83%, antithrombin III activity 96%, beta-2 GP 1 antibody panel negative.    · Therefore, the patient has no detectable underlying hypercoagulable state.  None of her family members however had a detectable risk factor either.  This does produce some uncertainty in regards to her risk with tamoxifen.    · Therefore was elected to proceed with aromatase inhibitor therapy instead as she became surgically postmenopausal.    · She does continue on aspirin 81 mg a day.    3. Osteopenia:   · The patient had a baseline DEXA scan from 9/21/17 showing mild osteopenia, maximal T score -1.2 and left femoral neck.    · DEXA scan 2/19/2020 showed a decline in her bone density with maximal T score now -2.1 involving the left hip.  Initiated Fosamax 70 mg weekly on 3/4/2020.  Plan a repeat DEXA scan in 1 year.  · Patient returns today in follow-up continuing on Fosamax 70 mg weekly.  She did undergo follow-up DEXA scan on 2/22/2021.  This showed an improvement in bone density in her hips with left hip -1.7, right hip -1.7 and lumbar spine -1.7.  We will continue on Fosamax and plan to repeat DEXA scan at a 2-year interval.  4. Vitamin D deficiency:   · 25 hydroxy vitamin D level on 10/10/17 was very low at 17.8.  The patient was asked to increase vitamin D supplementation to 2400 units daily.    · Vitamin D level was still low at 26.9 on 2/2/18.  High dose vitamin D prescribed 50,000 units weekly ×8 weeks and then returned to her previous dose 2400 units daily.    · Follow-up  25-hydroxy vitamin D level on 5/2/18 was 46.4.    · Level on 3/4/2020 declined to 30.1 which, borderline low.  Increased vitamin D supplementation to 3000 units daily.  · On 8/19/2020, 25 hydroxy vitamin D level remained borderline at 31.2 and vitamin D dose was increased to 5000 units daily.  5. Postmenopausal hot flashes:   · The patient was experiencing these postoperatively from her bilateral salpingo-oophorectomy.   · With initiation of adjuvant aromatase inhibitor therapy on 10/10/17, it was elected to also currently begin Effexor XR 37.5 mg daily.   · Unfortunately this did not adequately control her symptoms and Effexor dose was increased to 75 mg daily 2/2/18.    · Patient reports that hot flashes are minimal and tolerable on Aromasin.  She will continue Effexor XR 75 mg daily.  6. Health maintenance:   · The patient underwent a screening colonoscopy on 11/7/17 at age 52.  2 polyps were removed, benign adenomas.    · The patient's mother was subsequently diagnosed with colon cancer in 2018 at age 77.    · We did review her previous genetic test profile which did include mismatch repair genes and was negative.    · It was requested by Dr. Tom that she undergo a one year follow-up colonoscopy which was due in November 2018.  · The patient has not yet undergone follow-up colonoscopy.  She did call the GI office after we discussed the issue last month however has had some difficulty getting through on the telephone.    We will assist her in scheduling her colonoscopy in the near future.  7. Aromatase inhibitor musculoskeletal syndrome:  · Around August 2020 patient developed severe pain and swelling left wrist.  This was felt to potentially be related to anastrozole, anastrozole was held.  · Additional labs 8/19/2020 with ESR 10, CRP 0.24, LARON panel negative, rheumatoid factor negative.  · Administered Medrol Dosepak with temporary improvement in symptoms  · Patient was seen by hand surgery, x-ray showed  osteoarthritis, question of tendon injury and received a steroid injection with some minor improvement.  · Patient was prescribed gabapentin 300 mg daily and hydrocodone 7.5/325 by her primary care physician.  · On 11/4/2020 attempted retreatment with aromatase inhibitor in the form of Aromasin 25 mg daily.  Patient also prescribed Medrol Dosepak and was placed on Mobic 15 mg daily  · Today, patient reports that her arthralgias are mild and tolerable, mainly involving the hands, wrists, knees.  She continues on Aromasin 25 mg daily as well as Mobic 15 mg daily.  She is continuing on gabapentin 300 mg daily and feels that this does help and he has cut down on hydrocodone that has been prescribed by her primary care physician..     Plan:     1. Continue adjuvant Aromasin 25 mg daily  2. Continue Effexor XR 75 mg daily  3. Continue aspirin 81 mg daily  4. Continue vitamin D 5000 units daily.   5. Continue Fosamax 70 mg weekly  6. Continue Mobic 15 mg daily  7. We will assist the patient in contacting GI to arrange for follow-up colonoscopy  8. The patient will continue gabapentin 300 mg daily as needed which is being prescribed by her primary care physician  9. Schedule annual left-sided mammogram in June 2021  10. MD visit in 6 months with CBC, CMP.

## 2021-03-03 ENCOUNTER — LAB (OUTPATIENT)
Dept: LAB | Facility: HOSPITAL | Age: 56
End: 2021-03-03

## 2021-03-03 ENCOUNTER — OFFICE VISIT (OUTPATIENT)
Dept: ONCOLOGY | Facility: CLINIC | Age: 56
End: 2021-03-03

## 2021-03-03 VITALS
WEIGHT: 179.5 LBS | HEART RATE: 88 BPM | RESPIRATION RATE: 18 BRPM | OXYGEN SATURATION: 98 % | SYSTOLIC BLOOD PRESSURE: 105 MMHG | DIASTOLIC BLOOD PRESSURE: 86 MMHG | HEIGHT: 61 IN | TEMPERATURE: 97.3 F | BODY MASS INDEX: 33.89 KG/M2

## 2021-03-03 DIAGNOSIS — Z17.0 MALIGNANT NEOPLASM OF UPPER-OUTER QUADRANT OF RIGHT BREAST IN FEMALE, ESTROGEN RECEPTOR POSITIVE (HCC): Primary | ICD-10-CM

## 2021-03-03 DIAGNOSIS — C50.411 MALIGNANT NEOPLASM OF UPPER-OUTER QUADRANT OF RIGHT BREAST IN FEMALE, ESTROGEN RECEPTOR POSITIVE (HCC): Primary | ICD-10-CM

## 2021-03-03 DIAGNOSIS — R23.2 HOT FLASHES: ICD-10-CM

## 2021-03-03 DIAGNOSIS — Z17.0 MALIGNANT NEOPLASM OF UPPER-OUTER QUADRANT OF RIGHT BREAST IN FEMALE, ESTROGEN RECEPTOR POSITIVE (HCC): ICD-10-CM

## 2021-03-03 DIAGNOSIS — C50.411 MALIGNANT NEOPLASM OF UPPER-OUTER QUADRANT OF RIGHT BREAST IN FEMALE, ESTROGEN RECEPTOR POSITIVE (HCC): ICD-10-CM

## 2021-03-03 DIAGNOSIS — M85.852 OSTEOPENIA OF LEFT THIGH: ICD-10-CM

## 2021-03-03 LAB
ALBUMIN SERPL-MCNC: 4.4 G/DL (ref 3.5–5.2)
ALBUMIN/GLOB SERPL: 1.4 G/DL (ref 1.1–2.4)
ALP SERPL-CCNC: 115 U/L (ref 38–116)
ALT SERPL W P-5'-P-CCNC: 13 U/L (ref 0–33)
ANION GAP SERPL CALCULATED.3IONS-SCNC: 11.7 MMOL/L (ref 5–15)
AST SERPL-CCNC: 16 U/L (ref 0–32)
BASOPHILS # BLD AUTO: 0.07 10*3/MM3 (ref 0–0.2)
BASOPHILS NFR BLD AUTO: 1 % (ref 0–1.5)
BILIRUB SERPL-MCNC: 1.1 MG/DL (ref 0.2–1.2)
BUN SERPL-MCNC: 13 MG/DL (ref 6–20)
BUN/CREAT SERPL: 22 (ref 7.3–30)
CALCIUM SPEC-SCNC: 9.7 MG/DL (ref 8.5–10.2)
CHLORIDE SERPL-SCNC: 104 MMOL/L (ref 98–107)
CO2 SERPL-SCNC: 24.3 MMOL/L (ref 22–29)
CREAT SERPL-MCNC: 0.59 MG/DL (ref 0.6–1.1)
DEPRECATED RDW RBC AUTO: 41.9 FL (ref 37–54)
EOSINOPHIL # BLD AUTO: 0.21 10*3/MM3 (ref 0–0.4)
EOSINOPHIL NFR BLD AUTO: 3.1 % (ref 0.3–6.2)
ERYTHROCYTE [DISTWIDTH] IN BLOOD BY AUTOMATED COUNT: 12.6 % (ref 12.3–15.4)
GFR SERPL CREATININE-BSD FRML MDRD: 107 ML/MIN/1.73
GLOBULIN UR ELPH-MCNC: 3.2 GM/DL (ref 1.8–3.5)
GLUCOSE SERPL-MCNC: 130 MG/DL (ref 74–124)
HCT VFR BLD AUTO: 47.1 % (ref 34–46.6)
HGB BLD-MCNC: 15.7 G/DL (ref 12–15.9)
IMM GRANULOCYTES # BLD AUTO: 0.03 10*3/MM3 (ref 0–0.05)
IMM GRANULOCYTES NFR BLD AUTO: 0.4 % (ref 0–0.5)
LYMPHOCYTES # BLD AUTO: 1.45 10*3/MM3 (ref 0.7–3.1)
LYMPHOCYTES NFR BLD AUTO: 21.6 % (ref 19.6–45.3)
MCH RBC QN AUTO: 30.1 PG (ref 26.6–33)
MCHC RBC AUTO-ENTMCNC: 33.3 G/DL (ref 31.5–35.7)
MCV RBC AUTO: 90.2 FL (ref 79–97)
MONOCYTES # BLD AUTO: 0.41 10*3/MM3 (ref 0.1–0.9)
MONOCYTES NFR BLD AUTO: 6.1 % (ref 5–12)
NEUTROPHILS NFR BLD AUTO: 4.53 10*3/MM3 (ref 1.7–7)
NEUTROPHILS NFR BLD AUTO: 67.8 % (ref 42.7–76)
NRBC BLD AUTO-RTO: 0 /100 WBC (ref 0–0.2)
PLATELET # BLD AUTO: 309 10*3/MM3 (ref 140–450)
PMV BLD AUTO: 9.4 FL (ref 6–12)
POTASSIUM SERPL-SCNC: 4.6 MMOL/L (ref 3.5–4.7)
PROT SERPL-MCNC: 7.6 G/DL (ref 6.3–8)
RBC # BLD AUTO: 5.22 10*6/MM3 (ref 3.77–5.28)
SODIUM SERPL-SCNC: 140 MMOL/L (ref 134–145)
WBC # BLD AUTO: 6.7 10*3/MM3 (ref 3.4–10.8)

## 2021-03-03 PROCEDURE — 36415 COLL VENOUS BLD VENIPUNCTURE: CPT

## 2021-03-03 PROCEDURE — 85025 COMPLETE CBC W/AUTO DIFF WBC: CPT

## 2021-03-03 PROCEDURE — 80053 COMPREHEN METABOLIC PANEL: CPT

## 2021-03-03 PROCEDURE — 99214 OFFICE O/P EST MOD 30 MIN: CPT | Performed by: INTERNAL MEDICINE

## 2021-03-18 ENCOUNTER — TELEPHONE (OUTPATIENT)
Dept: ONCOLOGY | Facility: CLINIC | Age: 56
End: 2021-03-18

## 2021-03-18 NOTE — TELEPHONE ENCOUNTER
Pt said Dr. Gallegos said he was going to refer her to Dr. Salas for colonoscopy a couple of weeks ago, but she hasn't heard from anyone about an appt.    [phone]

## 2021-04-13 RX ORDER — CLOBETASOL PROPIONATE 0.5 MG/G
OINTMENT TOPICAL
Qty: 60 G | Refills: 0 | Status: SHIPPED | OUTPATIENT
Start: 2021-04-13 | End: 2022-06-06 | Stop reason: SDUPTHER

## 2021-05-21 RX ORDER — ALENDRONATE SODIUM 70 MG/1
TABLET ORAL
Qty: 4 TABLET | Refills: 3 | Status: SHIPPED | OUTPATIENT
Start: 2021-05-21

## 2021-06-03 ENCOUNTER — OFFICE VISIT (OUTPATIENT)
Dept: OBSTETRICS AND GYNECOLOGY | Facility: CLINIC | Age: 56
End: 2021-06-03

## 2021-06-03 VITALS
HEIGHT: 61 IN | WEIGHT: 179 LBS | SYSTOLIC BLOOD PRESSURE: 132 MMHG | BODY MASS INDEX: 33.79 KG/M2 | DIASTOLIC BLOOD PRESSURE: 70 MMHG

## 2021-06-03 DIAGNOSIS — Z01.419 ROUTINE GYNECOLOGICAL EXAMINATION: Primary | ICD-10-CM

## 2021-06-03 PROCEDURE — 81002 URINALYSIS NONAUTO W/O SCOPE: CPT | Performed by: OBSTETRICS & GYNECOLOGY

## 2021-06-03 PROCEDURE — 99396 PREV VISIT EST AGE 40-64: CPT | Performed by: OBSTETRICS & GYNECOLOGY

## 2021-06-03 RX ORDER — AMOXICILLIN AND CLAVULANATE POTASSIUM 500; 125 MG/1; MG/1
TABLET, FILM COATED ORAL
COMMUNITY
End: 2022-06-06

## 2021-06-03 RX ORDER — BENZONATATE 100 MG/1
CAPSULE ORAL
COMMUNITY
End: 2022-06-06

## 2021-06-03 RX ORDER — SORBITOL SOLUTION 70 %
SOLUTION, ORAL MISCELLANEOUS
COMMUNITY

## 2021-06-03 RX ORDER — PREDNISONE 20 MG/1
TABLET ORAL
COMMUNITY
End: 2022-06-06

## 2021-06-03 RX ORDER — ERGOCALCIFEROL 1.25 MG/1
CAPSULE ORAL
COMMUNITY

## 2021-09-14 RX ORDER — EXEMESTANE 25 MG/1
25 TABLET ORAL DAILY
Qty: 30 TABLET | Refills: 0 | Status: SHIPPED | OUTPATIENT
Start: 2021-09-14 | End: 2021-09-15

## 2021-09-15 RX ORDER — EXEMESTANE 25 MG/1
25 TABLET ORAL DAILY
Qty: 30 TABLET | Refills: 0 | Status: SHIPPED | OUTPATIENT
Start: 2021-09-15

## 2021-09-17 ENCOUNTER — DOCUMENTATION (OUTPATIENT)
Dept: PHARMACY | Facility: HOSPITAL | Age: 56
End: 2021-09-17

## 2021-09-17 RX ORDER — EXEMESTANE 25 MG/1
25 TABLET ORAL DAILY
OUTPATIENT
Start: 2021-09-17

## 2021-09-17 NOTE — PROGRESS NOTES
Today I received a refill request for a 90 day supply of Aromasin. I have denied the request because a 30 day supply was approved 2 days ago and the patient does not have an appointment scheduled.     Pauly Vilchis - Care Coordinator   9/17/2021  11:34 EDT

## 2021-10-16 NOTE — PROGRESS NOTES
16-Oct-2021 15:59 GYN Annual Exam     CC- Here for annual exam.     Jessica Suazo is a 55 y.o. female established patient who presents for annual well woman exam. . No  VB. She is using Clobetasol ointment x1, x2 a week.    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  KYM: Sister and brother with PE, brother with DVT, pt is Protein S normal  Last pap: 2020- normal pap/HPV  Pt is BRCA negative    Health Maintenance   Topic Date Due   • Pneumococcal Vaccine 0-64 (1 of 1 - PPSV23) Never done   • HEPATITIS C SCREENING  Never done   • ZOSTER VACCINE (1 of 2) Never done   • ANNUAL PHYSICAL  2018   • COLORECTAL CANCER SCREENING  2018   • Annual Gynecologic Pelvic and Breast Exam  2021   • MAMMOGRAM  2021   • INFLUENZA VACCINE  2021   • PAP SMEAR  2023   • DXA SCAN  2023   • TDAP/TD VACCINES (2 - Td or Tdap) 2030   • COVID-19 Vaccine  Completed       Past Medical History:   Diagnosis Date   • Anxiety    • Asthma    • Breast cancer (CMS/HCC) 2017    Right   • Cancer (CMS/HCC) 2017    Right   • GERD (gastroesophageal reflux disease) 2017   • Herpes     + HSv no outbreaks   • Lichen sclerosus    • Lichen sclerosus et atrophicus 2017   • Migraine    • Osteopenia    • Polyp of colon, adenomatous        Past Surgical History:   Procedure Laterality Date   • BILATERAL BREAST REDUCTION Left 12/15/2017    Procedure: LEFT BREAST  REDUCTION ;  Surgeon: Maurine Waterhouse, MD;  Location: [facility] OR;  Service:    • BREAST BIOPSY Right 2017   • BREAST RECONSTRUCTION Right 2017    Procedure: RT TISSUE EXPANDER W/ ALLODERM;  Surgeon: Maurine Waterhouse, MD;  Location: [facility] OR;  Service:    • BREAST TISSUE EXPANDER REMOVAL INSERTION OF IMPLANT Right 12/15/2017    Procedure: RIGHT BREAST TISSUE EXPANDER REMOVAL INSERTION OF IMPLANT;  Surgeon: Maurine Waterhouse, MD;  Location: [facility] OR;  Service:    •  SECTION     • COLONOSCOPY N/A 2017    Procedure: COLONOSCOPY, polypectomy;  Surgeon: Lalita Dubose MD;  Location: [facility] OR;  Service:    • D & C HYSTEROSCOPY N/A 2017    Procedure: DILATATION AND CURETTAGE HYSTEROSCOPY;  Surgeon: Dee Morales MD;  Location: [facility];  Service:    • DIAGNOSTIC LAPAROSCOPY Bilateral 2017    Procedure: DIAGNOSTIC LAPAROSCOPY,laproscopic bilateral salpingoophorectomy ;  Surgeon: Dee Morales MD;  Location: [facility];  Service:    • DILATATION AND CURETTAGE     • HAND SURGERY Right    • MASTECTOMY Right 2017    Procedure: right total mastectomy, right axillary sentinel lymph node biopsy x2 with immediate reconstruction of right breast with expander.;  Surgeon: Dyan Kinsey MD;  Location: [facility];  Service:    • PELVIC LAPAROSCOPY      BSO   • REDUCTION MAMMAPLASTY           Current Outpatient Medications:   •  alendronate (FOSAMAX) 70 MG tablet, TAKE 1 TABLET BY MOUTH EVERY 7 DAYS, Disp: 4 tablet, Rfl: 3  •  amoxicillin-clavulanate (AUGMENTIN) 500-125 MG per tablet, amoxicillin 500 mg-potassium clavulanate 125 mg tablet  TAKE 1 TABLET BY MOUTH EVERY 12 HOURS FOR 7 DAYS, Disp: , Rfl:   •  benzonatate (TESSALON) 100 MG capsule, benzonatate 100 mg capsule  TAKE 1 TO 2 CAPSULES BY MOUTH THREE TIMES DAILY AS NEEDED FOR COUGH, Disp: , Rfl:   •  Calcium Carbonate-Vitamin D (CALCIUM PLUS VITAMIN D PO), Take 1,200 mg by mouth 2 (Two) Times  a Day., Disp: , Rfl:   •  Cholecalciferol (VITAMIN D3) 125 MCG (5000 UT) capsule capsule, Take 1 capsule by mouth Daily. Needs new prescription.   Got thrown away on accident and medication is gone, Disp: 30 capsule, Rfl: 2  •  clobetasol (TEMOVATE) 0.05 % ointment, APPLY EXTERNALLY TO THE AFFECTED AREA TWICE DAILY FOR 7 DAYS AS NEEDED FOR ITCHING, Disp: 60 g, Rfl: 0  •  ergocalciferol (ERGOCALCIFEROL) 1.25 MG (18418 UT) capsule, Vitamin D2 1,250 mcg (50,000 unit) capsule, Disp: , Rfl:   •  exemestane (Aromasin) 25 MG chemo tablet, Take 1 tablet by mouth Daily., Disp: 30 tablet, Rfl: 5  •  fluticasone-salmeterol (ADVAIR) 100-50 MCG/DOSE DISKUS, Inhale 1 puff As Needed (SOB OR WHEEZING)., Disp: , Rfl:   •  gabapentin (NEURONTIN) 300 MG capsule, TK 1 C PO QD HS, Disp: , Rfl:   •  Influenza Vac Subunit Quad (FLUCELVAX QUADRIVALENT) 0.5 ML suspension prefilled syringe injection, Flucelvax Quad [id number] (PF) 60 mcg (15 mcg x 4)/0.5 mL IM syringe, Disp: , Rfl:   •  meloxicam (MOBIC) 15 MG tablet, TAKE 1 TABLET BY MOUTH DAILY, Disp: 90 tablet, Rfl: 0  •  Multiple Vitamins-Minerals (MULTIVITAMIN ADULT PO), Take  by mouth., Disp: , Rfl:   •  predniSONE (DELTASONE) 20 MG tablet, prednisone 20 mg tablet  TAKE 1 TABLET BY MOUTH EVERY DAY FOR 5 DAYS, Disp: , Rfl:   •  rosuvastatin (CRESTOR) 5 MG tablet, Take 5 mg by mouth Every Night., Disp: , Rfl:   •  sorbitol 70 % solution, sorbitol 70 % solution  MIX EACH DOSE WITH 8 OUNCES OF SUGAR FREE CLEAR LIQUID. DRINK AT TIMES GIVEN ON WRITTEN INFORMATION., Disp: , Rfl:   •  venlafaxine XR (EFFEXOR-XR) 75 MG 24 hr capsule, take 1 capsule by mouth once daily, Disp: 30 capsule, Rfl: 5    Allergies   Allergen Reactions   • Sesame Seed [Sesame Oil] Anaphylaxis     Sunflower seed and the food family of it     • Tramadol Unknown - Low Severity       Social History     Tobacco Use   • Smoking status: Former Smoker     Packs/day: 0.50     Years: 10.00     Pack years: 5.00     Types:  "Cigarettes     Quit date:      Years since quittin.4   • Smokeless tobacco: Never Used   Substance Use Topics   • Alcohol use: Yes     Comment: Occasional   • Drug use: No       Family History   Problem Relation Age of Onset   • Breast cancer Mother 70   • Hearing loss Mother    • Hyperlipidemia Mother    • Colon cancer Mother 77   • Diabetes Father    • Asthma Sister    • Clotting disorder Sister    • COPD Sister    • Pulmonary embolism Sister    • Deep vein thrombosis Brother    • Pulmonary embolism Brother    • Protein S deficiency Brother    • Colon cancer Maternal Grandfather 56   • Malig Hyperthermia Neg Hx    • Ovarian cancer Neg Hx        Review of Systems   Constitutional: Positive for activity change (exercising more). Negative for appetite change, fatigue, fever and unexpected weight change.   Respiratory: Negative for cough and shortness of breath.    Cardiovascular: Negative for chest pain and palpitations.   Gastrointestinal: Negative for abdominal distention, abdominal pain, constipation, diarrhea and nausea.   Endocrine: Positive for heat intolerance (mild).   Genitourinary: Negative for dyspareunia, dysuria, hematuria, menstrual problem, pelvic pain, vaginal bleeding and vaginal discharge.   Skin: Negative for color change and rash (recent shingles).   Neurological: Negative for headaches.   Psychiatric/Behavioral: Negative for dysphoric mood. The patient is not nervous/anxious.        /70   Ht 154.9 cm (61\")   Wt 81.2 kg (179 lb)   Breastfeeding No   BMI 33.82 kg/m²     Physical Exam   Constitutional: She is oriented to person, place, and time. She appears well-developed.   HENT:   Head: Normocephalic and atraumatic.   Eyes: Conjunctivae are normal. No scleral icterus.   Neck: No thyromegaly present.   Cardiovascular: Normal rate and regular rhythm.   Pulmonary/Chest: Effort normal and breath sounds normal. Right breast exhibits no mass and no skin change. Left breast exhibits no " inverted nipple, no mass, no nipple discharge and no skin change.   R implant noted  L s/p reduction       Abdominal: Soft. Normal appearance and bowel sounds are normal. She exhibits no distension and no mass. There is no abdominal tenderness. There is no rebound and no guarding. No hernia.   Genitourinary:          Pelvic exam was performed with patient supine.   There is no rash, tenderness or lesion on the right labia. There is no rash, tenderness or lesion on the left labia. Cervix exhibits no motion tenderness, no discharge and no friability. Right adnexum displays no mass, no tenderness and no fullness. Left adnexum displays no mass, no tenderness and no fullness.    No vaginal discharge, erythema, tenderness or bleeding.   No erythema, tenderness or bleeding in the vagina.    No foreign body in the vagina.      No signs of injury in the vagina.     Neurological: She is alert and oriented to person, place, and time.   Skin: Skin is warm and dry.   Psychiatric: Her behavior is normal. Mood, judgment and thought content normal.   Nursing note and vitals reviewed.         Assessment/Plan    1) GYN HM: normal pap/HPV  1/2020. SBE demonstrated and encouraged.  2) STD screening: declines Condoms encouraged.  3) Bone health - Weight bearing exercise, dietary calcium recommendations and vitamin D reviewed.   4) Diet and Exercise discussed  5) Smoking Status: non smoker  6) LS- stable, cont Clobetasol prn  7)MMG:  UTD 6/2020 B1, ff by oncology  8) DEXA- UTD 2/2020, osteopenia, r/o fracture 6.3 & 0.8 %.   9)C scope- UTD 5/2021 last week- repeat in 3 years  10)Parts of this document have been copied or forwarded from her previous visits and have been reviewed, updated and edited as indicated.   11)I saw the patient with a face mask, gloves and eye protection  The patient herself was masked.  Social distancing was observed as appropriate.  12)Follow up prn and 1 year annual     Diagnoses and all orders for this  visit:    1. Routine gynecological examination (Primary)  -     POC Urinalysis Dipstick        Dee Morales MD  6/3/2021  18:21 EDT

## 2021-10-23 ENCOUNTER — HOSPITAL ENCOUNTER (OUTPATIENT)
Dept: MAMMOGRAPHY | Facility: HOSPITAL | Age: 56
Discharge: HOME | End: 2021-10-23
Admitting: INTERNAL MEDICINE

## 2021-10-23 DIAGNOSIS — C50.411 MALIGNANT NEOPLASM OF UPPER-OUTER QUADRANT OF RIGHT BREAST IN FEMALE, ESTROGEN RECEPTOR POSITIVE (HCC): ICD-10-CM

## 2021-10-23 DIAGNOSIS — Z17.0 MALIGNANT NEOPLASM OF UPPER-OUTER QUADRANT OF RIGHT BREAST IN FEMALE, ESTROGEN RECEPTOR POSITIVE (HCC): ICD-10-CM

## 2021-10-23 PROCEDURE — 77067 SCR MAMMO BI INCL CAD: CPT

## 2021-10-23 PROCEDURE — 77063 BREAST TOMOSYNTHESIS BI: CPT

## 2022-05-24 NOTE — TELEPHONE ENCOUNTER
Received vm from pt stating she was returning a call from yesterday. Called pt back, went to voicemail. Left her a msg to give us a call back.   
Skin intact

## 2022-06-06 ENCOUNTER — OFFICE VISIT (OUTPATIENT)
Dept: OBSTETRICS AND GYNECOLOGY | Facility: CLINIC | Age: 57
End: 2022-06-06

## 2022-06-06 VITALS
DIASTOLIC BLOOD PRESSURE: 82 MMHG | SYSTOLIC BLOOD PRESSURE: 132 MMHG | HEIGHT: 61 IN | BODY MASS INDEX: 32.47 KG/M2 | WEIGHT: 172 LBS

## 2022-06-06 DIAGNOSIS — Z01.419 ROUTINE GYNECOLOGICAL EXAMINATION: ICD-10-CM

## 2022-06-06 DIAGNOSIS — Z85.3 PERSONAL HISTORY OF BREAST CANCER: Primary | ICD-10-CM

## 2022-06-06 DIAGNOSIS — R31.9 HEMATURIA, UNSPECIFIED TYPE: ICD-10-CM

## 2022-06-06 DIAGNOSIS — Z80.3 FAMILY HISTORY OF BREAST CANCER: ICD-10-CM

## 2022-06-06 DIAGNOSIS — Z11.51 SPECIAL SCREENING EXAMINATION FOR HUMAN PAPILLOMAVIRUS (HPV): ICD-10-CM

## 2022-06-06 DIAGNOSIS — Z01.419 PAP SMEAR, LOW-RISK: ICD-10-CM

## 2022-06-06 DIAGNOSIS — N90.4 LICHEN SCLEROSUS OF FEMALE GENITALIA: ICD-10-CM

## 2022-06-06 LAB
BILIRUB BLD-MCNC: NEGATIVE MG/DL
CLARITY, POC: CLEAR
COLOR UR: YELLOW
GLUCOSE UR STRIP-MCNC: NEGATIVE MG/DL
KETONES UR QL: NEGATIVE
LEUKOCYTE EST, POC: NEGATIVE
NITRITE UR-MCNC: NEGATIVE MG/ML
PH UR: 5 [PH] (ref 5–8)
PROT UR STRIP-MCNC: NEGATIVE MG/DL
RBC # UR STRIP: ABNORMAL /UL
SP GR UR: 1 (ref 1–1.03)
UROBILINOGEN UR QL: NORMAL

## 2022-06-06 PROCEDURE — 81002 URINALYSIS NONAUTO W/O SCOPE: CPT | Performed by: OBSTETRICS & GYNECOLOGY

## 2022-06-06 PROCEDURE — 99396 PREV VISIT EST AGE 40-64: CPT | Performed by: OBSTETRICS & GYNECOLOGY

## 2022-06-06 PROCEDURE — 99213 OFFICE O/P EST LOW 20 MIN: CPT | Performed by: OBSTETRICS & GYNECOLOGY

## 2022-06-06 RX ORDER — CLOBETASOL PROPIONATE 0.5 MG/G
OINTMENT TOPICAL
Qty: 60 G | Refills: 0 | Status: SHIPPED | OUTPATIENT
Start: 2022-06-06 | End: 2022-12-12 | Stop reason: SDUPTHER

## 2022-06-06 RX ORDER — HYDROCODONE BITARTRATE AND ACETAMINOPHEN 7.5; 325 MG/1; MG/1
1 TABLET ORAL 3 TIMES DAILY PRN
COMMUNITY
Start: 2022-05-02

## 2022-06-06 RX ORDER — ZOLPIDEM TARTRATE 5 MG/1
TABLET ORAL
COMMUNITY

## 2022-06-06 RX ORDER — TRAZODONE HYDROCHLORIDE 50 MG/1
TABLET ORAL
COMMUNITY

## 2022-06-06 RX ORDER — PNEUMOCOCCAL VACCINE POLYVALENT 25; 25; 25; 25; 25; 25; 25; 25; 25; 25; 25; 25; 25; 25; 25; 25; 25; 25; 25; 25; 25; 25; 25 UG/.5ML; UG/.5ML; UG/.5ML; UG/.5ML; UG/.5ML; UG/.5ML; UG/.5ML; UG/.5ML; UG/.5ML; UG/.5ML; UG/.5ML; UG/.5ML; UG/.5ML; UG/.5ML; UG/.5ML; UG/.5ML; UG/.5ML; UG/.5ML; UG/.5ML; UG/.5ML; UG/.5ML; UG/.5ML; UG/.5ML
INJECTION, SOLUTION INTRAMUSCULAR; SUBCUTANEOUS
COMMUNITY

## 2022-06-06 NOTE — PROGRESS NOTES
GYN Annual Exam     CC- Here for annual exam.     Jessica Suazo is a 56 y.o. female established patient who presents for annual well woman exam. . No  VB. She is using Clobetasol ointment rarely and is having a flar of LS now. She has large blood in her urine today and denies gross hematuria. She needs to reconnect with Dr Gallegos's office for f/u.     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  KYM: Sister and brother with PE, brother with DVT, pt is Protein S normal  Last pap: 2020- normal pap/HPV  Pt is BRCA negative  S/p BSO  Lichen sclerosis    Health Maintenance   Topic Date Due   • COVID-19 Vaccine (1) Never done   • Pneumococcal Vaccine 0-64 (1 - PCV) Never done   • HEPATITIS C SCREENING  Never done   • ZOSTER VACCINE (1 of 2) Never done   • ANNUAL PHYSICAL  2018   • COLORECTAL CANCER SCREENING  2018   • Annual Gynecologic Pelvic and Breast Exam  2022   • INFLUENZA VACCINE  2022   • MAMMOGRAM  10/23/2022   • DXA SCAN  2023   • PAP SMEAR  2025   • TDAP/TD VACCINES (2 - Td or Tdap) 2030       Past Medical History:   Diagnosis Date   • Anxiety    • Asthma    • Breast cancer (HCC) 2017    Right   • Cancer (HCC) 2017    Right   • GERD (gastroesophageal reflux disease) 2017   • Herpes     + HSv no outbreaks   • Lichen sclerosus    • Lichen sclerosus et atrophicus 2017   • Migraine    • Osteopenia    •  Polyp of colon, adenomatous        Past Surgical History:   Procedure Laterality Date   • BILATERAL BREAST REDUCTION Left 12/15/2017    Procedure: LEFT BREAST REDUCTION ;  Surgeon: Maurine Waterhouse, MD;  Location: [facility] OR;  Service:    • BREAST BIOPSY Right 2017   • BREAST RECONSTRUCTION Right 2017    Procedure: RT TISSUE EXPANDER W/ ALLODERM;  Surgeon: Maurine Waterhouse, MD;  Location: [facility] OR;  Service:    • BREAST TISSUE EXPANDER REMOVAL INSERTION OF IMPLANT Right 12/15/2017    Procedure: RIGHT BREAST TISSUE EXPANDER REMOVAL INSERTION OF IMPLANT;  Surgeon: Maurine Waterhouse, MD;  Location: [facility] OR;  Service:    •  SECTION     • COLONOSCOPY N/A 2017    Procedure: COLONOSCOPY, polypectomy;  Surgeon: Lalita Dubose MD;  Location: [facility] OR;  Service:    • D & C HYSTEROSCOPY N/A 2017    Procedure: DILATATION AND CURETTAGE HYSTEROSCOPY;  Surgeon: Dee Morales MD;  Location: [facility] OR;  Service:    • DIAGNOSTIC LAPAROSCOPY Bilateral 2017    Procedure: DIAGNOSTIC LAPAROSCOPY,laproscopic bilateral salpingoophorectomy ;  Surgeon: Dee Morales MD;  Location: [facility] OR;  Service:    • DILATATION AND CURETTAGE     • HAND SURGERY Right    • MASTECTOMY Right 2017    Procedure: right total mastectomy, right axillary sentinel lymph node biopsy x2 with immediate reconstruction of right breast with expander.;  Surgeon: Dyan Kinsey MD;  Location: [facility] OR;  Service:    • PELVIC LAPAROSCOPY      BSO   • REDUCTION MAMMAPLASTY           Current Outpatient Medications:   •  Calcium Carbonate-Vitamin D (CALCIUM PLUS VITAMIN D PO), Take 1,200 mg by mouth 2 (Two) Times a Day., Disp: , Rfl:   •  Cholecalciferol (VITAMIN D3) 125 MCG (5000 UT) capsule capsule, Take 1 capsule by mouth Daily. Needs new prescription.   Got thrown away on accident and medication is gone, Disp: 30 capsule, Rfl: 2  •  clobetasol (TEMOVATE) 0.05 % ointment, APPLY  EXTERNALLY TO THE AFFECTED AREA TWICE DAILY FOR 7 DAYS AS NEEDED FOR ITCHING, Disp: 60 g, Rfl: 0  •  fluticasone-salmeterol (ADVAIR) 100-50 MCG/DOSE DISKUS, Inhale 1 puff As Needed (SOB OR WHEEZING)., Disp: , Rfl:   •  gabapentin (NEURONTIN) 300 MG capsule, TK 1 C PO QD HS, Disp: , Rfl:   •  HYDROcodone-acetaminophen (NORCO) 7.5-325 MG per tablet, Take 1 tablet by mouth 3 (Three) Times a Day As Needed., Disp: , Rfl:   •  Influenza Vac Subunit Quad (FLUCELVAX) 0.5 ML suspension prefilled syringe injection, Flucelvax Quad [id number] (PF) 60 mcg (15 mcg x 4)/0.5 mL IM syringe, Disp: , Rfl:   •  meloxicam (MOBIC) 15 MG tablet, TAKE 1 TABLET BY MOUTH DAILY, Disp: 90 tablet, Rfl: 0  •  Multiple Vitamins-Minerals (MULTIVITAMIN ADULT PO), Take  by mouth., Disp: , Rfl:   •  pneumococcal polysaccharide 23-valent (Pneumovax 23) 25 MCG/0.5ML vaccine, Pneumovax-23 25 mcg/0.5 mL injection syringe  ADMINISTER 0.5ML IN THE MUSCLE AS DIRECTED, Disp: , Rfl:   •  rosuvastatin (CRESTOR) 5 MG tablet, Take 5 mg by mouth Every Night., Disp: , Rfl:   •  traZODone (DESYREL) 50 MG tablet, trazodone 50 mg tablet, Disp: , Rfl:   •  zolpidem (AMBIEN) 5 MG tablet, zolpidem 5 mg tablet  TAKE 1 TABLET BY MOUTH EVERY DAY AS NEEDED, Disp: , Rfl:   •  alendronate (FOSAMAX) 70 MG tablet, TAKE 1 TABLET BY MOUTH EVERY 7 DAYS, Disp: 4 tablet, Rfl: 3  •  ergocalciferol (ERGOCALCIFEROL) 1.25 MG (77276 UT) capsule, Vitamin D2 1,250 mcg (50,000 unit) capsule, Disp: , Rfl:   •  exemestane (AROMASIN) 25 MG chemo tablet, TAKE 1 TABLET BY MOUTH DAILY, Disp: 30 tablet, Rfl: 0  •  sorbitol 70 % solution, sorbitol 70 % solution  MIX EACH DOSE WITH 8 OUNCES OF SUGAR FREE CLEAR LIQUID. DRINK AT TIMES GIVEN ON WRITTEN INFORMATION., Disp: , Rfl:   •  venlafaxine XR (EFFEXOR-XR) 75 MG 24 hr capsule, take 1 capsule by mouth once daily, Disp: 30 capsule, Rfl: 5    Allergies   Allergen Reactions   • Sesame Seed [Sesame Oil] Anaphylaxis     Sunflower seed and the food family  "of it     • Tramadol Unknown - Low Severity       Social History     Tobacco Use   • Smoking status: Former Smoker     Packs/day: 0.50     Years: 10.00     Pack years: 5.00     Types: Cigarettes     Quit date: 2007     Years since quitting: 15.4   • Smokeless tobacco: Never Used   Vaping Use   • Vaping Use: Never used   Substance Use Topics   • Alcohol use: Yes     Comment: Occasional   • Drug use: No       Family History   Problem Relation Age of Onset   • Breast cancer Mother 70   • Hearing loss Mother    • Hyperlipidemia Mother    • Colon cancer Mother 77   • Diabetes Father    • Asthma Sister    • Clotting disorder Sister    • COPD Sister    • Pulmonary embolism Sister    • Deep vein thrombosis Brother    • Pulmonary embolism Brother    • Protein S deficiency Brother    • Colon cancer Maternal Grandfather 56   • Malig Hyperthermia Neg Hx    • Ovarian cancer Neg Hx        Review of Systems   Constitutional: Positive for activity change. Negative for appetite change, fatigue, fever and unexpected weight change.   Respiratory: Negative for cough and shortness of breath.    Cardiovascular: Negative for chest pain and palpitations.   Gastrointestinal: Negative for abdominal distention, abdominal pain, constipation, diarrhea and nausea.   Endocrine: Positive for heat intolerance (mild).   Genitourinary: Positive for vaginal pain. Negative for dyspareunia, dysuria, hematuria, menstrual problem, pelvic pain, vaginal bleeding and vaginal discharge.   Skin: Negative for color change and rash (recent shingles).   Neurological: Negative for headaches.   Psychiatric/Behavioral: Negative for dysphoric mood. The patient is not nervous/anxious.        /82   Ht 154.9 cm (61\")   Wt 78 kg (172 lb)   Breastfeeding No   BMI 32.50 kg/m²     Physical Exam   Constitutional: She is oriented to person, place, and time. She appears well-developed.   HENT:   Head: Normocephalic and atraumatic.   Eyes: Conjunctivae are normal. No " scleral icterus.   Neck: No thyromegaly present.   Cardiovascular: Normal rate and regular rhythm.   Pulmonary/Chest: Effort normal and breath sounds normal. Right breast exhibits no mass and no skin change. Left breast exhibits no inverted nipple, no mass, no nipple discharge and no skin change.   R implant noted  L s/p reduction       Abdominal: Soft. Normal appearance and bowel sounds are normal. She exhibits no distension and no mass. There is no abdominal tenderness. There is no rebound and no guarding. No hernia.   Genitourinary:          Pelvic exam was performed with patient supine.   There is rash on the right labia. There is no tenderness or lesion on the right labia. There is rash on the left labia. There is no tenderness or lesion on the left labia. Uterus is not deviated, not enlarged, not fixed and not tender. Cervix exhibits no motion tenderness, no discharge and no friability. Right adnexum displays no mass, no tenderness and no fullness. Left adnexum displays no mass, no tenderness and no fullness.    No vaginal discharge, erythema, tenderness or bleeding.   No erythema, tenderness or bleeding in the vagina.    No foreign body in the vagina.      No signs of injury in the vagina.     Neurological: She is alert and oriented to person, place, and time.   Skin: Skin is warm and dry.   Psychiatric: Her behavior is normal. Mood, judgment and thought content normal.   Nursing note and vitals reviewed.         Assessment/Plan    1) GYN HM: normal pap/HPV  1/2020 check pap/HPV. SBE demonstrated and encouraged.  2) STD screening: declines Condoms encouraged.  3) Bone health - Weight bearing exercise, dietary calcium recommendations and vitamin D reviewed.   4) Hematuria- check UA and CS  5) Smoking Status: non smoker  6) LS- having a flare , use Clobetasol BID x 7 days then prn  7)MMG:  UTD 10/2021 B1, ff by oncology but pt needs to make an appt ASAP. Order placed for dx MMG L with roma 10/2022.  8) DEXA-  UTD 2/2021 osteopenia, on Fosaamx  9)C scope- UTD 5/2021  repeat in 3 years  10)Parts of this document have been copied or forwarded from her previous visits and have been reviewed, updated and edited as indicated.   11)I saw the patient with a face mask, gloves and eye protection  The patient herself was masked.  Social distancing was observed as appropriate.  12)Follow up prn and 1 year annual   13)  I spent > 20 minutes on the separately reported service of hematuria, LS. This time is not included in the time used to support the annual E/M service also reported today.      Diagnoses and all orders for this visit:    1. Hematuria, unspecified type (Primary)  -     Urine Culture - Urine, Urine, Random Void  -     Urinalysis With Microscopic - Urine, Random Void    2. Routine gynecological examination  -     POC Urinalysis Dipstick  -     IgP, Aptima HPV    3. Pap smear, low-risk  -     POC Urinalysis Dipstick  -     IgP, Aptima HPV    4. Special screening examination for human papillomavirus (HPV)  -     POC Urinalysis Dipstick  -     IgP, Aptima HPV    5. Family history of breast cancer    6. Personal history of breast cancer  -     Mammo Diagnostic Digital Tomosynthesis Left With CAD; Future    7. Lichen sclerosus of female genitalia    Other orders  -     clobetasol (TEMOVATE) 0.05 % ointment; APPLY EXTERNALLY TO THE AFFECTED AREA TWICE DAILY FOR 7 DAYS AS NEEDED FOR ITCHING  Dispense: 60 g; Refill: 0  -     Microscopic Examination -        Dee Morales MD  6/6/2022  14:30 EDT

## 2022-06-08 LAB
APPEARANCE UR: CLEAR
BACTERIA #/AREA URNS HPF: NORMAL /[HPF]
BACTERIA UR CULT: NORMAL
BACTERIA UR CULT: NORMAL
BILIRUB UR QL STRIP: NEGATIVE
CASTS URNS QL MICRO: NORMAL /LPF
COLOR UR: YELLOW
CYTOLOGIST CVX/VAG CYTO: NORMAL
CYTOLOGY CVX/VAG DOC CYTO: NORMAL
CYTOLOGY CVX/VAG DOC THIN PREP: NORMAL
DX ICD CODE: NORMAL
EPI CELLS #/AREA URNS HPF: NORMAL /HPF (ref 0–10)
GLUCOSE UR QL STRIP: NEGATIVE
HGB UR QL STRIP: NEGATIVE
HIV 1 & 2 AB SER-IMP: NORMAL
HPV I/H RISK 4 DNA CVX QL PROBE+SIG AMP: NEGATIVE
KETONES UR QL STRIP: NEGATIVE
LEUKOCYTE ESTERASE UR QL STRIP: NEGATIVE
MICRO URNS: NORMAL
MICRO URNS: NORMAL
NITRITE UR QL STRIP: NEGATIVE
OTHER STN SPEC: NORMAL
PH UR STRIP: 6.5 [PH] (ref 5–7.5)
PROT UR QL STRIP: NEGATIVE
RBC #/AREA URNS HPF: NORMAL /HPF (ref 0–2)
SP GR UR STRIP: 1.02 (ref 1–1.03)
STAT OF ADQ CVX/VAG CYTO-IMP: NORMAL
UROBILINOGEN UR STRIP-MCNC: 0.2 MG/DL (ref 0.2–1)
WBC #/AREA URNS HPF: NORMAL /HPF (ref 0–5)

## 2022-11-09 ENCOUNTER — HOSPITAL ENCOUNTER (OUTPATIENT)
Dept: MAMMOGRAPHY | Facility: HOSPITAL | Age: 57
Discharge: HOME | End: 2022-11-09
Admitting: OBSTETRICS & GYNECOLOGY

## 2022-11-09 ENCOUNTER — APPOINTMENT (OUTPATIENT)
Dept: ULTRASOUND IMAGING | Facility: HOSPITAL | Age: 57
End: 2022-11-09

## 2022-11-09 DIAGNOSIS — Z85.3 PERSONAL HISTORY OF BREAST CANCER: ICD-10-CM

## 2022-11-09 PROCEDURE — G0279 TOMOSYNTHESIS, MAMMO: HCPCS

## 2022-11-09 PROCEDURE — 77065 DX MAMMO INCL CAD UNI: CPT

## 2022-12-12 RX ORDER — CLOBETASOL PROPIONATE 0.5 MG/G
OINTMENT TOPICAL
Qty: 60 G | Refills: 0 | Status: SHIPPED | OUTPATIENT
Start: 2022-12-12

## 2025-03-25 NOTE — TELEPHONE ENCOUNTER
DEXA scan consistent with osteopenia.  Recommend she make sure she gets appropriate calcium and vitamin D intake.  Continue to stay active, particularly with weightbearing exercises.  Could consider medication for osteopenia, but patient typically tries to avoid other medications.  Fosamax would be an option. [facility] June 28, 2019 bilateral screening mammogram with 3D.  Scattered fibroglandular densities.  No evidence of malignancy BI-RADS 2.

## (undated) DEVICE — SKIN PREP TRAY W/CHG: Brand: MEDLINE INDUSTRIES, INC.

## (undated) DEVICE — ENDOPATH XCEL BLADELESS TROCARS WITH STABILITY SLEEVES: Brand: ENDOPATH XCEL

## (undated) DEVICE — SPNG GZ STRL 2S 4X4 12PLY

## (undated) DEVICE — IRRIGATOR BULB ASEPTO 60CC STRL

## (undated) DEVICE — 3M™ STERI-STRIP™ COMPOUND BENZOIN TINCTURE 40 BAGS/CARTON 4 CARTONS/CASE C1544: Brand: 3M™ STERI-STRIP™

## (undated) DEVICE — ABC HANDPIECE SINGLE FUNCTION HANDPIECE: Brand: ABC

## (undated) DEVICE — ENDOPOUCH RETRIEVER SPECIMEN RETRIEVAL BAGS: Brand: ENDOPOUCH RETRIEVER

## (undated) DEVICE — UNDERCAST PADDING: Brand: DEROYAL

## (undated) DEVICE — VIAL FORMALIN CAP 10P 40ML

## (undated) DEVICE — BIOPATCH™ ANTIMICROBIAL DRESSING WITH CHLORHEXIDINE GLUCONATE IS A HYDROPHILLIC POLYURETHANE ABSORPTIVE FOAM WITH CHLORHEXIDINE GLUCONATE (CHG) WHICH INHIBITS BACTERIAL GROWTH UNDER THE DRESSING. THE DRESSING IS INTENDED TO BE USED TO ABSORB EXUDATE, COVER A WOUND CAUSED BY VASCULAR AND NONVASCULAR PERCUTANEOUS MEDICAL DEVICES DURING SURGERY, AS WELL AS REDUCE LOCAL INFECTION AND COLONIZATION OF MICROORGANISMS.: Brand: BIOPATCH

## (undated) DEVICE — ENDOPATH PNEUMONEEDLE INSUFFLATION NEEDLES WITH LUER LOCK CONNECTORS 120MM: Brand: ENDOPATH

## (undated) DEVICE — SUT SILK 3/0 SH CR5 18IN C0135

## (undated) DEVICE — STERILE COTTON BALLS LARGE 5/P: Brand: MEDLINE

## (undated) DEVICE — GLV SURG SENSICARE W/ALOE PF LF 6.5 STRL

## (undated) DEVICE — SPNG LAP 18X18IN LF STRL PK/5

## (undated) DEVICE — BNDG ADHS FABRC 1X3IN

## (undated) DEVICE — PAD SANI MAXI W/ADHS SNG WRP 11IN

## (undated) DEVICE — 1010 S-DRAPE TOWEL DRAPE 10/BX: Brand: STERI-DRAPE™

## (undated) DEVICE — GLV SURG NEOLON 2G PF LF 6.5 STRL

## (undated) DEVICE — STRIP CLS WND SUTURESTRIP/PLS 0.5X5IN TP1105

## (undated) DEVICE — JACKSON-PRATT 100CC BULB RESERVOIR: Brand: CARDINAL HEALTH

## (undated) DEVICE — TBG INSUFL W FLTR STRL

## (undated) DEVICE — HARMONIC ACE +7 LAPAROSCOPIC SHEARS ADVANCED HEMOSTASIS 5MM DIAMETER 36CM SHAFT LENGTH  FOR USE WITH GRAY HAND PIECE ONLY: Brand: HARMONIC ACE

## (undated) DEVICE — SUT VIC 5/0 P3 18IN J493G

## (undated) DEVICE — GLV SURG BIOGEL LTX PF 6 1/2

## (undated) DEVICE — DRN WND JP RND W TROC SIL 10F 1/8IN

## (undated) DEVICE — SYR LUERLOK 30CC

## (undated) DEVICE — BANDAGE ROLL,100% COTTON, 6 PLY, LARGE: Brand: KERLIX

## (undated) DEVICE — GLV SURG BIOGEL LTX PF 7

## (undated) DEVICE — DRSNG TELFA PAD NONADH STR 1S 3X8IN

## (undated) DEVICE — 3M™ STERI-STRIP™ ANTIMICROBIAL SKIN CLOSURES 1/2 INCH X 4 INCHES 50/CARTON 4 CARTONS/CASE A1847: Brand: 3M™ STERI-STRIP™

## (undated) DEVICE — SYR LL 3CC

## (undated) DEVICE — SYR LUERLOK 50ML

## (undated) DEVICE — DRP Z/FRICTION 10X16IN

## (undated) DEVICE — 100% SILICONE URINE METER FOLEY TRAY,16 FR/CH (5.3 MM), 5 ML CATHETER PRE-CONNECTED TO 2000 ML DRAINAGE BAG WITH LUER-LOCK SAMPLING: Brand: DOVER

## (undated) DEVICE — SUT PDS 2 0 CT1 27IN CLR Z259H

## (undated) DEVICE — SUCTION CANISTER, 3000CC,SAFELINER: Brand: DEROYAL

## (undated) DEVICE — DECANT BG O JET

## (undated) DEVICE — NDL SPINE 22G 31/2IN BLK

## (undated) DEVICE — GOWN,PREVENTION PLUS,XXLARGE,STERILE: Brand: MEDLINE

## (undated) DEVICE — PK UNIV COMPL 40

## (undated) DEVICE — TOWEL,OR,DSP,ST,BLUE,STD,4/PK,20PK/CS: Brand: MEDLINE

## (undated) DEVICE — ELECTRD BLD EXT EDGE 1P COAT 6.5IN STRL

## (undated) DEVICE — BNDG ADHS CURAD FLX/FABRC 2X4IN STRL LF

## (undated) DEVICE — GAUZE,SPONGE,4"X4",16PLY,XRAY,STRL,LF: Brand: MEDLINE

## (undated) DEVICE — SUT VIC 3/0 PS2 27IN J427H

## (undated) DEVICE — TOTAL TRAY, 16FR 10ML SIL FOLEY, URN: Brand: MEDLINE

## (undated) DEVICE — THERMOGARD PLUS ABC DUAL DISPERSIVE ELECTRODE: Brand: THERMOGARD

## (undated) DEVICE — BNDG ELAS ELITE V/CLOSE 6IN 5YD LF STRL

## (undated) DEVICE — INTENDED FOR TISSUE SEPARATION, AND OTHER PROCEDURES THAT REQUIRE A SHARP SURGICAL BLADE TO PUNCTURE OR CUT.: Brand: BARD-PARKER ® CARBON RIB-BACK BLADES

## (undated) DEVICE — DRSNG SURESITE WNDW 4X4.5

## (undated) DEVICE — ANTIBACTERIAL UNDYED BRAIDED (POLYGLACTIN 910), SYNTHETIC ABSORBABLE SUTURE: Brand: COATED VICRYL

## (undated) DEVICE — TRAP POLYP 4CHAMBER

## (undated) DEVICE — PENCL E/S HNDSWCH ROCKR CB

## (undated) DEVICE — ENDOPATH XCEL UNIVERSAL TROCAR STABLILITY SLEEVES: Brand: ENDOPATH XCEL

## (undated) DEVICE — BW-412T DISP COMBO CLEANING BRUSH: Brand: SINGLE USE COMBINATION CLEANING BRUSH

## (undated) DEVICE — SUT VIC 3/0 TIES 18IN J110T

## (undated) DEVICE — LAG GYN LAPAROSCOPY: Brand: MEDLINE INDUSTRIES, INC.

## (undated) DEVICE — STPLR SKIN VISISTAT WD 35CT

## (undated) DEVICE — FRCP BX RADJAW4 NDL 2.8 240CM LG OG BX40

## (undated) DEVICE — GOWN ISOL W/THUMB UNIV BLU BX/15

## (undated) DEVICE — PK UNIV PLSTC 40

## (undated) DEVICE — SYR LUER SLPTP 50ML

## (undated) DEVICE — Device

## (undated) DEVICE — DRN WND JP RND W TROC SIL 15F 3/16IN

## (undated) DEVICE — SPNG GZ WOVN 4X4IN 12PLY 10/BX STRL

## (undated) DEVICE — COVER,MAYO STAND,STERILE: Brand: MEDLINE

## (undated) DEVICE — SNAR POLYP CAPTIFLX WD OVL 27MM 240CM

## (undated) DEVICE — MASK,FACE,SHIELD,BLUE,ANTI FOG,TIES: Brand: MEDLINE

## (undated) DEVICE — SUT VIC 3/0 SH 27IN J416H

## (undated) DEVICE — SYR LL TP 10ML STRL

## (undated) DEVICE — NDL SPINE 20G 3 1/2 YEL STRL 1P/U

## (undated) DEVICE — STRIP CLS WND SUTURESTRIP/PLS 0.5X4IN TP1103

## (undated) DEVICE — BANDAGE,GAUZE,BULKEE II,4.5"X4.1YD,STRL: Brand: MEDLINE

## (undated) DEVICE — CYSTO/BLADDER IRRIGATION SET, REGULATING CLAMP

## (undated) DEVICE — 3M™ STERI-STRIP™ REINFORCED ADHESIVE SKIN CLOSURES, R1547, 1/2 IN X 4 IN (12 MM X 100 MM), 6 STRIPS/ENVELOPE: Brand: 3M™ STERI-STRIP™

## (undated) DEVICE — MARKR SKIN W/RULR AND LBL

## (undated) DEVICE — VLV 4 NDL/FREE SAFESITE W CAP ASP/INJ

## (undated) DEVICE — ADHS LIQ MASTISOL 2/3ML

## (undated) DEVICE — SMOKE EVACUATION TUBING WITH 8 IN INTEGRAL WAND AND SPONGE GUARD: Brand: BUFFALO FILTER

## (undated) DEVICE — SOL NACL 0.9PCT 1000ML

## (undated) DEVICE — PAD GRND E/S W/CORD SPLT A/

## (undated) DEVICE — ELECTRD BLD EDGE/STD 1P 2.4X6.35MM STRL

## (undated) DEVICE — CONTAINER,SPECIMEN,OR STERILE,4OZ: Brand: MEDLINE

## (undated) DEVICE — SUT MNCRYL 5/0 P3 18 IN Y493G

## (undated) DEVICE — SUT VIC 0 CT1 36IN J946H

## (undated) DEVICE — SUT MNCRYL PLS ANTIB UD 4/0 PS2 18IN

## (undated) DEVICE — SOL NS 500ML

## (undated) DEVICE — 3M(TM) TEGADERM(TM) TRANSPARENT FILM DRESSING FRAME STYLE 1627: Brand: 3M™ TEGADERM™

## (undated) DEVICE — Device: Brand: DEFENDO AIR/WATER/SUCTION AND BIOPSY VALVE

## (undated) DEVICE — NDL FLTR 19G 1 1/2 LF

## (undated) DEVICE — COVER,LIGHT HANDLE,FLX,2/PK: Brand: MEDLINE INDUSTRIES, INC.

## (undated) DEVICE — MEDI-VAC YANKAUER SUCTION HANDLE W/BULBOUS TIP: Brand: CARDINAL HEALTH

## (undated) DEVICE — CVR PROB GEN PURP W ISOSILK 6X48

## (undated) DEVICE — SUT PDS 2/0 CT1 27IN DYED Z339H

## (undated) DEVICE — SUT MNCRYL 4/0 PS2 18 IN

## (undated) DEVICE — TUBING, SUCTION, 1/4" X 20', STRAIGHT: Brand: MEDLINE INDUSTRIES, INC.

## (undated) DEVICE — 1842 FOAM BLOCK NEEDLE COUNTER: Brand: DEVON